# Patient Record
Sex: FEMALE | Race: WHITE | NOT HISPANIC OR LATINO | Employment: FULL TIME | URBAN - METROPOLITAN AREA
[De-identification: names, ages, dates, MRNs, and addresses within clinical notes are randomized per-mention and may not be internally consistent; named-entity substitution may affect disease eponyms.]

---

## 2017-03-29 ENCOUNTER — LAB VISIT (OUTPATIENT)
Dept: LAB | Facility: HOSPITAL | Age: 52
End: 2017-03-29
Attending: INTERNAL MEDICINE
Payer: COMMERCIAL

## 2017-03-29 DIAGNOSIS — I10 HTN (HYPERTENSION): ICD-10-CM

## 2017-03-29 DIAGNOSIS — I10 HTN (HYPERTENSION): Primary | ICD-10-CM

## 2017-03-29 DIAGNOSIS — E78.00 HYPERCHOLESTEREMIA: ICD-10-CM

## 2017-03-29 DIAGNOSIS — E66.01 MORBID OBESITY: ICD-10-CM

## 2017-03-29 LAB
ALBUMIN SERPL BCP-MCNC: 3.7 G/DL
ALP SERPL-CCNC: 96 U/L
ALT SERPL W/O P-5'-P-CCNC: 24 U/L
ANION GAP SERPL CALC-SCNC: 11 MMOL/L
AST SERPL-CCNC: 19 U/L
BASOPHILS # BLD AUTO: 0.03 K/UL
BASOPHILS NFR BLD: 0.5 %
BILIRUB SERPL-MCNC: 0.6 MG/DL
BUN SERPL-MCNC: 14 MG/DL
CALCIUM SERPL-MCNC: 9.3 MG/DL
CHLORIDE SERPL-SCNC: 103 MMOL/L
CHOLEST/HDLC SERPL: 2.4 {RATIO}
CO2 SERPL-SCNC: 28 MMOL/L
CREAT SERPL-MCNC: 0.7 MG/DL
DIFFERENTIAL METHOD: ABNORMAL
EOSINOPHIL # BLD AUTO: 0.1 K/UL
EOSINOPHIL NFR BLD: 2.1 %
ERYTHROCYTE [DISTWIDTH] IN BLOOD BY AUTOMATED COUNT: 12.6 %
EST. GFR  (AFRICAN AMERICAN): >60 ML/MIN/1.73 M^2
EST. GFR  (NON AFRICAN AMERICAN): >60 ML/MIN/1.73 M^2
GLUCOSE SERPL-MCNC: 106 MG/DL
HCT VFR BLD AUTO: 37 %
HDL/CHOLESTEROL RATIO: 42.4 %
HDLC SERPL-MCNC: 191 MG/DL
HDLC SERPL-MCNC: 81 MG/DL
HGB BLD-MCNC: 12.5 G/DL
LDLC SERPL CALC-MCNC: 102.8 MG/DL
LYMPHOCYTES # BLD AUTO: 1.7 K/UL
LYMPHOCYTES NFR BLD: 27.9 %
MCH RBC QN AUTO: 31.4 PG
MCHC RBC AUTO-ENTMCNC: 33.8 %
MCV RBC AUTO: 93 FL
MONOCYTES # BLD AUTO: 0.5 K/UL
MONOCYTES NFR BLD: 8.8 %
NEUTROPHILS # BLD AUTO: 3.7 K/UL
NEUTROPHILS NFR BLD: 60.5 %
NONHDLC SERPL-MCNC: 110 MG/DL
PLATELET # BLD AUTO: 265 K/UL
PMV BLD AUTO: 10.1 FL
POTASSIUM SERPL-SCNC: 3.8 MMOL/L
PROT SERPL-MCNC: 7.7 G/DL
RBC # BLD AUTO: 3.98 M/UL
SODIUM SERPL-SCNC: 142 MMOL/L
TRIGL SERPL-MCNC: 36 MG/DL
WBC # BLD AUTO: 6.05 K/UL

## 2017-03-29 PROCEDURE — 80061 LIPID PANEL: CPT

## 2017-03-29 PROCEDURE — 36415 COLL VENOUS BLD VENIPUNCTURE: CPT | Mod: PO

## 2017-03-29 PROCEDURE — 80053 COMPREHEN METABOLIC PANEL: CPT

## 2017-03-29 PROCEDURE — 83036 HEMOGLOBIN GLYCOSYLATED A1C: CPT

## 2017-03-29 PROCEDURE — 85025 COMPLETE CBC W/AUTO DIFF WBC: CPT

## 2017-03-30 LAB
ESTIMATED AVG GLUCOSE: 111 MG/DL
HBA1C MFR BLD HPLC: 5.5 %

## 2017-08-06 ENCOUNTER — HOSPITAL ENCOUNTER (EMERGENCY)
Facility: HOSPITAL | Age: 52
Discharge: HOME | End: 2017-08-06
Attending: EMERGENCY MEDICINE
Payer: COMMERCIAL

## 2017-08-06 VITALS
RESPIRATION RATE: 18 BRPM | OXYGEN SATURATION: 99 % | HEIGHT: 61 IN | TEMPERATURE: 98 F | BODY MASS INDEX: 38.33 KG/M2 | HEART RATE: 74 BPM | DIASTOLIC BLOOD PRESSURE: 77 MMHG | SYSTOLIC BLOOD PRESSURE: 155 MMHG | WEIGHT: 203 LBS

## 2017-08-06 DIAGNOSIS — R51.9 HEADACHE: ICD-10-CM

## 2017-08-06 DIAGNOSIS — R51.9 NONINTRACTABLE HEADACHE, UNSPECIFIED CHRONICITY PATTERN, UNSPECIFIED HEADACHE TYPE: ICD-10-CM

## 2017-08-06 DIAGNOSIS — I10 ESSENTIAL HYPERTENSION: Primary | ICD-10-CM

## 2017-08-06 LAB
ALBUMIN SERPL BCP-MCNC: 3.7 G/DL
ALP SERPL-CCNC: 108 U/L
ALT SERPL W/O P-5'-P-CCNC: 44 U/L
ANION GAP SERPL CALC-SCNC: 14 MMOL/L
AST SERPL-CCNC: 29 U/L
BASOPHILS # BLD AUTO: 0.04 K/UL
BASOPHILS NFR BLD: 0.5 %
BILIRUB SERPL-MCNC: 0.6 MG/DL
BUN SERPL-MCNC: 14 MG/DL
CALCIUM SERPL-MCNC: 9.2 MG/DL
CHLORIDE SERPL-SCNC: 100 MMOL/L
CO2 SERPL-SCNC: 27 MMOL/L
CREAT SERPL-MCNC: 0.8 MG/DL
DIFFERENTIAL METHOD: NORMAL
EOSINOPHIL # BLD AUTO: 0.1 K/UL
EOSINOPHIL NFR BLD: 1.6 %
ERYTHROCYTE [DISTWIDTH] IN BLOOD BY AUTOMATED COUNT: 12.9 %
EST. GFR  (AFRICAN AMERICAN): >60 ML/MIN/1.73 M^2
EST. GFR  (NON AFRICAN AMERICAN): >60 ML/MIN/1.73 M^2
GLUCOSE SERPL-MCNC: 106 MG/DL
HCT VFR BLD AUTO: 40.9 %
HGB BLD-MCNC: 13.4 G/DL
LYMPHOCYTES # BLD AUTO: 2.2 K/UL
LYMPHOCYTES NFR BLD: 27.4 %
MCH RBC QN AUTO: 30.5 PG
MCHC RBC AUTO-ENTMCNC: 32.8 G/DL
MCV RBC AUTO: 93 FL
MONOCYTES # BLD AUTO: 0.8 K/UL
MONOCYTES NFR BLD: 10.2 %
NEUTROPHILS # BLD AUTO: 4.8 K/UL
NEUTROPHILS NFR BLD: 60.2 %
PLATELET # BLD AUTO: 291 K/UL
PMV BLD AUTO: 9.6 FL
POTASSIUM SERPL-SCNC: 3.3 MMOL/L
PROT SERPL-MCNC: 8.1 G/DL
RBC # BLD AUTO: 4.39 M/UL
SODIUM SERPL-SCNC: 141 MMOL/L
TROPONIN I SERPL DL<=0.01 NG/ML-MCNC: 0.02 NG/ML
WBC # BLD AUTO: 8.04 K/UL

## 2017-08-06 PROCEDURE — 80053 COMPREHEN METABOLIC PANEL: CPT

## 2017-08-06 PROCEDURE — 96374 THER/PROPH/DIAG INJ IV PUSH: CPT

## 2017-08-06 PROCEDURE — 96361 HYDRATE IV INFUSION ADD-ON: CPT

## 2017-08-06 PROCEDURE — 96375 TX/PRO/DX INJ NEW DRUG ADDON: CPT

## 2017-08-06 PROCEDURE — 25000003 PHARM REV CODE 250: Performed by: EMERGENCY MEDICINE

## 2017-08-06 PROCEDURE — 93010 ELECTROCARDIOGRAM REPORT: CPT | Mod: ,,, | Performed by: NUCLEAR MEDICINE

## 2017-08-06 PROCEDURE — 85025 COMPLETE CBC W/AUTO DIFF WBC: CPT

## 2017-08-06 PROCEDURE — 99900035 HC TECH TIME PER 15 MIN (STAT)

## 2017-08-06 PROCEDURE — 63600175 PHARM REV CODE 636 W HCPCS: Performed by: EMERGENCY MEDICINE

## 2017-08-06 PROCEDURE — 99284 EMERGENCY DEPT VISIT MOD MDM: CPT | Mod: 25

## 2017-08-06 PROCEDURE — 93005 ELECTROCARDIOGRAM TRACING: CPT

## 2017-08-06 PROCEDURE — 84484 ASSAY OF TROPONIN QUANT: CPT

## 2017-08-06 RX ORDER — METOCLOPRAMIDE HYDROCHLORIDE 5 MG/ML
10 INJECTION INTRAMUSCULAR; INTRAVENOUS
Status: COMPLETED | OUTPATIENT
Start: 2017-08-06 | End: 2017-08-06

## 2017-08-06 RX ORDER — BUTALBITAL, ACETAMINOPHEN AND CAFFEINE 50; 325; 40 MG/1; MG/1; MG/1
1 TABLET ORAL EVERY 4 HOURS PRN
Qty: 20 TABLET | Refills: 0 | Status: SHIPPED | OUTPATIENT
Start: 2017-08-06 | End: 2017-09-05

## 2017-08-06 RX ORDER — VERAPAMIL HYDROCHLORIDE 240 MG/1
240 TABLET, FILM COATED, EXTENDED RELEASE ORAL NIGHTLY
COMMUNITY
End: 2019-05-06 | Stop reason: ALTCHOICE

## 2017-08-06 RX ORDER — KETOROLAC TROMETHAMINE 30 MG/ML
30 INJECTION, SOLUTION INTRAMUSCULAR; INTRAVENOUS
Status: COMPLETED | OUTPATIENT
Start: 2017-08-06 | End: 2017-08-06

## 2017-08-06 RX ORDER — DULOXETIN HYDROCHLORIDE 20 MG/1
20 CAPSULE, DELAYED RELEASE ORAL DAILY
COMMUNITY
End: 2019-05-21 | Stop reason: SDUPTHER

## 2017-08-06 RX ORDER — LOSARTAN POTASSIUM AND HYDROCHLOROTHIAZIDE 12.5; 1 MG/1; MG/1
1 TABLET ORAL DAILY
COMMUNITY
End: 2019-08-06 | Stop reason: SDUPTHER

## 2017-08-06 RX ORDER — DIPHENHYDRAMINE HYDROCHLORIDE 50 MG/ML
25 INJECTION INTRAMUSCULAR; INTRAVENOUS
Status: COMPLETED | OUTPATIENT
Start: 2017-08-06 | End: 2017-08-06

## 2017-08-06 RX ADMIN — SODIUM CHLORIDE 500 ML: 0.9 INJECTION, SOLUTION INTRAVENOUS at 06:08

## 2017-08-06 RX ADMIN — KETOROLAC TROMETHAMINE 30 MG: 30 INJECTION, SOLUTION INTRAMUSCULAR; INTRAVENOUS at 06:08

## 2017-08-06 RX ADMIN — METOCLOPRAMIDE 10 MG: 5 INJECTION, SOLUTION INTRAMUSCULAR; INTRAVENOUS at 06:08

## 2017-08-06 RX ADMIN — DIPHENHYDRAMINE HYDROCHLORIDE 25 MG: 50 INJECTION, SOLUTION INTRAMUSCULAR; INTRAVENOUS at 06:08

## 2017-08-06 NOTE — ED PROVIDER NOTES
Encounter Date: 2017       History     Chief Complaint   Patient presents with    Hypertension     with headache since Friday, vomiting yesterday that has resolved,      The history is provided by the patient.   Headache    This is a new problem. The current episode started yesterday. The problem occurs constantly. The problem has been unchanged. The pain is located in the left unilateral region. The pain does not radiate. The pain quality is similar to prior headaches. The quality of the pain is described as aching and dull. Pain scale: mild. Associated symptoms include nausea (today), photophobia and vomiting (one time yesterday). Pertinent negatives include no abdominal pain, abnormal behavior, anorexia, back pain, blurred vision, coughing, dizziness, drainage, ear pain, eye pain, eye redness, eye watering, facial sweating, fever, hearing loss, insomnia, loss of balance, muscle aches, neck pain, numbness, phonophobia, rhinorrhea, scalp tenderness, seizures, sinus pressure, sore throat, swollen glands, tingling, tinnitus, visual change, weakness or weight loss. Nothing aggravates the symptoms. She has tried acetaminophen for the symptoms. The treatment provided mild relief. Her past medical history is significant for hypertension and obesity. There is no history of cancer, cluster headaches, immunosuppression, migraine headaches, migraines in the family, pseudotumor cerebri, recent head traumas, sinus disease or TMJ.     Review of patient's allergies indicates:  No Known Allergies  Past Medical History:   Diagnosis Date    Depression     Hypertension      Past Surgical History:   Procedure Laterality Date     SECTION      EYE SURGERY  2017     History reviewed. No pertinent family history.  Social History   Substance Use Topics    Smoking status: Never Smoker    Smokeless tobacco: Never Used    Alcohol use Yes      Comment: rarely      Review of Systems   Constitutional: Negative for  fever and weight loss.   HENT: Negative for ear pain, hearing loss, rhinorrhea, sinus pressure, sore throat and tinnitus.    Eyes: Positive for photophobia. Negative for blurred vision, pain and redness.   Respiratory: Negative for cough and shortness of breath.    Cardiovascular: Negative for chest pain.   Gastrointestinal: Positive for nausea (today) and vomiting (one time yesterday). Negative for abdominal pain and anorexia.   Genitourinary: Negative for dysuria.   Musculoskeletal: Negative for back pain and neck pain.   Skin: Negative for rash.   Neurological: Positive for headaches. Negative for dizziness, tingling, seizures, weakness, numbness and loss of balance.   Hematological: Does not bruise/bleed easily.   Psychiatric/Behavioral: The patient does not have insomnia.    All other systems reviewed and are negative.      Physical Exam     Initial Vitals [08/06/17 1824]   BP Pulse Resp Temp SpO2   (!) 237/105 81 18 98.3 °F (36.8 °C) 98 %      MAP       149         Physical Exam    Nursing note and vitals reviewed.  Constitutional: She appears well-developed and well-nourished.   HENT:   Head: Normocephalic and atraumatic.       Right Ear: Hearing, tympanic membrane, external ear and ear canal normal.   Left Ear: Hearing, tympanic membrane, external ear and ear canal normal.   Nose: Nose normal.   Mouth/Throat: Uvula is midline, oropharynx is clear and moist and mucous membranes are normal. No oropharyngeal exudate.   Eyes: Conjunctivae and EOM are normal. Pupils are equal, round, and reactive to light.   Neck: Normal range of motion. Neck supple. No thyromegaly present.   Cardiovascular: Normal rate, regular rhythm, normal heart sounds and intact distal pulses. Exam reveals no gallop and no friction rub.    No murmur heard.  Pulmonary/Chest: Breath sounds normal. No respiratory distress. She has no wheezes. She has no rhonchi. She exhibits no tenderness.   Abdominal: Soft. Bowel sounds are normal. She  "exhibits no distension. There is no tenderness. There is no rebound and no guarding.   Musculoskeletal: Normal range of motion. She exhibits no edema or tenderness.   Lymphadenopathy:     She has no cervical adenopathy.   Neurological: She is alert and oriented to person, place, and time. She has normal strength. No cranial nerve deficit or sensory deficit.   Skin: Skin is warm and dry. No rash noted.   Psychiatric: She has a normal mood and affect. Her behavior is normal. Judgment and thought content normal.         ED Course   Procedures  Labs Reviewed   COMPREHENSIVE METABOLIC PANEL - Abnormal; Notable for the following:        Result Value    Potassium 3.3 (*)     All other components within normal limits   CBC W/ AUTO DIFFERENTIAL   TROPONIN I     EKG Readings: (Independently Interpreted)   Initial Reading: No STEMI. Rhythm: Normal Sinus Rhythm. Heart Rate: 75. Ectopy: No Ectopy. Conduction: Normal. ST Segments: Normal ST Segments. T Waves: Normal. Clinical Impression: Normal Sinus Rhythm             Vitals:    08/06/17 1824 08/06/17 1900 08/06/17 1936 08/06/17 2100   BP: (!) 237/105 (!) 192/96 (!) 182/84 (!) 155/77   Pulse: 81 71 64 74   Resp: 18  15 18   Temp: 98.3 °F (36.8 °C)      TempSrc: Oral      SpO2: 98% 100% 98% 99%   Weight: 92.1 kg (203 lb)      Height: 5' 1" (1.549 m)          Results for orders placed or performed during the hospital encounter of 08/06/17   CBC auto differential   Result Value Ref Range    WBC 8.04 3.90 - 12.70 K/uL    RBC 4.39 4.00 - 5.40 M/uL    Hemoglobin 13.4 12.0 - 16.0 g/dL    Hematocrit 40.9 37.0 - 48.5 %    MCV 93 82 - 98 fL    MCH 30.5 27.0 - 31.0 pg    MCHC 32.8 32.0 - 36.0 g/dL    RDW 12.9 11.5 - 14.5 %    Platelets 291 150 - 350 K/uL    MPV 9.6 9.2 - 12.9 fL    Gran # 4.8 1.8 - 7.7 K/uL    Lymph # 2.2 1.0 - 4.8 K/uL    Mono # 0.8 0.3 - 1.0 K/uL    Eos # 0.1 0.0 - 0.5 K/uL    Baso # 0.04 0.00 - 0.20 K/uL    Gran% 60.2 38.0 - 73.0 %    Lymph% 27.4 18.0 - 48.0 %    Mono% " 10.2 4.0 - 15.0 %    Eosinophil% 1.6 0.0 - 8.0 %    Basophil% 0.5 0.0 - 1.9 %    Differential Method Automated    Comprehensive metabolic panel   Result Value Ref Range    Sodium 141 136 - 145 mmol/L    Potassium 3.3 (L) 3.5 - 5.1 mmol/L    Chloride 100 95 - 110 mmol/L    CO2 27 23 - 29 mmol/L    Glucose 106 70 - 110 mg/dL    BUN, Bld 14 6 - 20 mg/dL    Creatinine 0.8 0.5 - 1.4 mg/dL    Calcium 9.2 8.7 - 10.5 mg/dL    Total Protein 8.1 6.0 - 8.4 g/dL    Albumin 3.7 3.5 - 5.2 g/dL    Total Bilirubin 0.6 0.1 - 1.0 mg/dL    Alkaline Phosphatase 108 55 - 135 U/L    AST 29 10 - 40 U/L    ALT 44 10 - 44 U/L    Anion Gap 14 8 - 16 mmol/L    eGFR if African American >60.0 >60 mL/min/1.73 m^2    eGFR if non African American >60.0 >60 mL/min/1.73 m^2   Troponin I #1   Result Value Ref Range    Troponin I 0.018 0.000 - 0.026 ng/mL         Imaging Results          CT Head Without Contrast (Final result)  Result time 08/06/17 19:18:13    Final result by BRITTA Espinoza Sr., MD (08/06/17 19:18:13)                 Impression:         Normal study.      All CT scans at this facility use dose modulation, iterative reconstruction, and/or weight base dosing when appropriate to reduce radiation dose when appropriate to reduce radiation dose to as low as reasonably achievable.      Electronically signed by: BRITTA ESPINOZA MD  Date:     08/06/17  Time:    19:18              Narrative:    CT of Head without IV contrast    History:     Headache    Technique: Standard brain CT protocol without IV contrast was performed.     Finding: The ventricles have a normal size, position, and appearance. There is no abnormal intracranial mass or intracranial hemorrhage. There is no skull fracture. The paranasal sinuses are normal in appearance.                              Medications   sodium chloride 0.9% bolus 500 mL (0 mLs Intravenous Stopped 8/6/17 2104)   ketorolac injection 30 mg (30 mg Intravenous Given 8/6/17 9518)   diphenhydrAMINE injection 25  mg (25 mg Intravenous Given 8/6/17 1849)   metoclopramide HCl injection 10 mg (10 mg Intravenous Given 8/6/17 1849)       8:27 PM - Re-evaluation: The patient is resting comfortably and is in no acute distress. She states that her symptoms have improved after treatment within ER. Discussed test results, shared treatment plan, specific conditions for return, and importance of follow up with patient and family.  She understands and agrees with the plan as discussed. Answered  her questions at this time. She has remained hemodynamically stable throughout the ED course and is appropriate for discharge home.     Regarding HYPERTENSION, I advised patient to: keep a record of blood pressure results; take your blood pressure medication exactly as directed without skipping doses; avoid medications that contain heart stimulants, including over-the-counter drugs such as decongestants; maintain a healthy weight; cut back on sodium intake (i.e., limit canned, dried, packaged, and fast foods and dont add salt to food); follow the DASH (Dietary Approaches to Stop Hypertension) eating plan which recommends vegetables, fruits, whole gains, and other heart healthy foods; begin an exercise program that includes  aerobic exercise 3 to 4 times a week for an average of 40 minutes at a time (with approval of cardiologist or primary care provider); limit drinks that contain alcohol and caffeine; control levels of emotional stress; and seek emergency care for any shortness of breath, chest pain, difficulty speaking, confusion, or visual changes.      Patient's headache is consistent with previous headaches and lacks features concerning for emergent or life threatening condition.  I do not suspect SAH, meningitis, increased IC pressure, infectious, toxic, vascular, CNS, or other EMC.  I have discussed this at length with patient.  Regarding treatment, advised patient to take nonsteroidal antiinflammatory medications, acetaminophen, or any  medications prescribed as instructed.  To prevent headaches, patient advised to avoid muscle tension (do not stay in one position for long periods of time), avoid eye strain (make sure there is adequate lighting for reading and routine tasks), eat healthy foods, exercise, and do not smoke or drink excessive alcohol.  Patient also advised to avoid overuse of over-the-counter or prescription medications. Patient was instructed to contact primary healthcare provider if: headaches continue to get worse; occur often enough that they affect daily work or normal activities; frequent medication use is needed to manage headaches; headaches that worsen and cause vomiting; or there are any questions or concerns about the condition or care. Advised patient to return to the emergency department or call 911 if they develop a sudden headache that presents suddenly and much worse than usual headaches; have difficulty seeing, speaking, or moving; become confused or have seizure activity; or develop a fever and stiff neck with the headache.     Pre-hypertension/Hypertension: The pt has been informed that they may have pre-hypertension or hypertension based on a blood pressure reading in the ED. I recommend that the pt call the PCP listed on their discharge instructions or a physician of their choice this week to arrange f/u for further evaluation of possible pre-hypertension or hypertension.     Janette Lee was given a handout which discussed their disease process, precautions, and instructions for follow-up and therapy.    Follow-up Information     Jordy Cenetno MD. Schedule an appointment as soon as possible for a visit in 1 week.    Specialty:  Internal Medicine  Contact information:  [address]  [facility] [address]  [phone]             [facility].    Specialty:  Emergency Medicine  Why:  As needed, If symptoms worsen  Contact information:  [address] [facility]  [address]  [place] [address]  [phone]                 Discharge Medication List as of 8/6/2017  8:00 PM      START taking these medications    Details   butalbital-acetaminophen-caffeine -40 mg (FIORICET, ESGIC) -40 mg per tablet Take 1 tablet by mouth every 4 (four) hours as needed for Pain., Starting Sun 8/6/2017, Until Tue 9/5/2017, Print                ED Diagnosis  1. Essential hypertension    2. Headache    3. Nonintractable headache, unspecified chronicity pattern, unspecified headache type                          ED Course     Clinical Impression:   The primary encounter diagnosis was Essential hypertension. Diagnoses of Headache and Nonintractable headache, unspecified chronicity pattern, unspecified headache type were also pertinent to this visit.    Disposition:   Disposition: Discharged  Condition: Stable                        Ralf Alvarenga Jr., MD  08/07/17 0424

## 2017-08-06 NOTE — ED NOTES
Pt resting in bed.  C/o htn & headache since Friday, vomiting yesterday, none today.   Tylenol taken for  Headache at 1400 without relief,   Denies tingling , numbness, changes in vision.  Had a weak spell this afternoon & had to stop & sit for a while.. RR equal and unlabored.  Is compliant with BP meds. Will continue to monitorPatient verbally verified and Spelled Full Name and Date of Birth. LOC: The patient is awake, alert and aware of environment with an appropriate affect, the patient is oriented x 3 and speaking appropriately.  APPEARANCE: Patient is clean and well groomed, patient's clothing is properly fastened.  HEENT: Brief WNL  SKIN: warm & dry   MUSCULOSKELETAL: Brief WNL, MAEW, denies pain  RESPIRATORY: chest clear dana, denies cough or cold or SOB  CARDIAC:Sinus rhythm at 82/min, denies chest pain  GASTRO: denies nausea or abdominal pain, abdomen soft  : Brief WNL  Peripheral Vasc: no edema  NEURO: awake & alert, speech clear  PSYCH: Brief WNL

## 2017-08-07 NOTE — DISCHARGE INSTRUCTIONS
Patient's headache is consistent with previous headaches and lacks features concerning for emergent or life threatening condition.  I do not suspect SAH, meningitis, increased IC pressure, infectious, toxic, vascular, CNS, or other EMC.  I have discussed this at length with patient.  Regarding treatment, advised patient to take nonsteroidal antiinflammatory medications, acetaminophen, or any medications prescribed as instructed.  To prevent headaches, patient advised to avoid muscle tension (do not stay in one position for long periods of time), avoid eye strain (make sure there is adequate lighting for reading and routine tasks), eat healthy foods, exercise, and do not smoke or drink excessive alcohol.  Patient also advised to avoid overuse of over-the-counter or prescription medications. Patient was instructed to contact primary healthcare provider if: headaches continue to get worse; occur often enough that they affect daily work or normal activities; frequent medication use is needed to manage headaches; headaches that worsen and cause vomiting; or there are any questions or concerns about the condition or care. Advised patient to return to the emergency department or call 911 if they develop a sudden headache that presents suddenly and much worse than usual headaches; have difficulty seeing, speaking, or moving; become confused or have seizure activity; or develop a fever and stiff neck with the headache.     Regarding HYPERTENSION, I advised patient to: keep a record of blood pressure results; take your blood pressure medication exactly as directed without skipping doses; avoid medications that contain heart stimulants, including over-the-counter drugs such as decongestants; maintain a healthy weight; cut back on sodium intake (i.e., limit canned, dried, packaged, and fast foods and don?t add salt to food); follow the DASH (Dietary Approaches to Stop Hypertension) eating plan which recommends vegetables,  fruits, whole gains, and other heart healthy foods; begin an exercise program that includes  aerobic exercise 3 to 4 times a week for an average of 40 minutes at a time (with approval of cardiologist or primary care provider); limit drinks that contain alcohol and caffeine; control levels of emotional stress; and seek emergency care for any shortness of breath, chest pain, difficulty speaking, confusion, or visual changes.

## 2017-08-07 NOTE — ED NOTES
Pt states no further needs/concerns. resp even, unlabored. No distress noted. Pt AAOx3. Will d/c per MD order. MD aware of pt's vital signs and states ok for d/c.

## 2018-08-27 ENCOUNTER — HOSPITAL ENCOUNTER (EMERGENCY)
Facility: HOSPITAL | Age: 53
Discharge: HOME | End: 2018-08-27
Attending: EMERGENCY MEDICINE
Payer: COMMERCIAL

## 2018-08-27 VITALS
WEIGHT: 198.06 LBS | TEMPERATURE: 98 F | SYSTOLIC BLOOD PRESSURE: 165 MMHG | RESPIRATION RATE: 20 BRPM | BODY MASS INDEX: 37.39 KG/M2 | DIASTOLIC BLOOD PRESSURE: 85 MMHG | OXYGEN SATURATION: 98 % | HEIGHT: 61 IN | HEART RATE: 65 BPM

## 2018-08-27 DIAGNOSIS — I10 ESSENTIAL HYPERTENSION: ICD-10-CM

## 2018-08-27 DIAGNOSIS — R42 VERTIGO: Primary | ICD-10-CM

## 2018-08-27 LAB
ALBUMIN SERPL BCP-MCNC: 3.9 G/DL
ALP SERPL-CCNC: 94 U/L
ALT SERPL W/O P-5'-P-CCNC: 24 U/L
ANION GAP SERPL CALC-SCNC: 12 MMOL/L
AST SERPL-CCNC: 19 U/L
BASOPHILS # BLD AUTO: 0.03 K/UL
BASOPHILS NFR BLD: 0.4 %
BILIRUB SERPL-MCNC: 0.7 MG/DL
BUN SERPL-MCNC: 12 MG/DL
CALCIUM SERPL-MCNC: 9.8 MG/DL
CHLORIDE SERPL-SCNC: 103 MMOL/L
CO2 SERPL-SCNC: 29 MMOL/L
CREAT SERPL-MCNC: 0.8 MG/DL
DIFFERENTIAL METHOD: NORMAL
EOSINOPHIL # BLD AUTO: 0.1 K/UL
EOSINOPHIL NFR BLD: 1.3 %
ERYTHROCYTE [DISTWIDTH] IN BLOOD BY AUTOMATED COUNT: 12.6 %
EST. GFR  (AFRICAN AMERICAN): >60 ML/MIN/1.73 M^2
EST. GFR  (NON AFRICAN AMERICAN): >60 ML/MIN/1.73 M^2
GLUCOSE SERPL-MCNC: 110 MG/DL
HCT VFR BLD AUTO: 41.7 %
HGB BLD-MCNC: 14 G/DL
LYMPHOCYTES # BLD AUTO: 2 K/UL
LYMPHOCYTES NFR BLD: 27.5 %
MCH RBC QN AUTO: 30.9 PG
MCHC RBC AUTO-ENTMCNC: 33.6 G/DL
MCV RBC AUTO: 92 FL
MONOCYTES # BLD AUTO: 0.6 K/UL
MONOCYTES NFR BLD: 8.1 %
NEUTROPHILS # BLD AUTO: 4.5 K/UL
NEUTROPHILS NFR BLD: 62.6 %
PLATELET # BLD AUTO: 282 K/UL
PMV BLD AUTO: 10.3 FL
POTASSIUM SERPL-SCNC: 3.6 MMOL/L
PROT SERPL-MCNC: 8.3 G/DL
RBC # BLD AUTO: 4.53 M/UL
SODIUM SERPL-SCNC: 144 MMOL/L
WBC # BLD AUTO: 7.14 K/UL

## 2018-08-27 PROCEDURE — 85025 COMPLETE CBC W/AUTO DIFF WBC: CPT

## 2018-08-27 PROCEDURE — 96375 TX/PRO/DX INJ NEW DRUG ADDON: CPT | Mod: 59

## 2018-08-27 PROCEDURE — 96361 HYDRATE IV INFUSION ADD-ON: CPT

## 2018-08-27 PROCEDURE — 96374 THER/PROPH/DIAG INJ IV PUSH: CPT

## 2018-08-27 PROCEDURE — 25000003 PHARM REV CODE 250: Performed by: EMERGENCY MEDICINE

## 2018-08-27 PROCEDURE — 63600175 PHARM REV CODE 636 W HCPCS: Performed by: EMERGENCY MEDICINE

## 2018-08-27 PROCEDURE — 99284 EMERGENCY DEPT VISIT MOD MDM: CPT | Mod: 25

## 2018-08-27 PROCEDURE — 80053 COMPREHEN METABOLIC PANEL: CPT

## 2018-08-27 RX ORDER — ONDANSETRON 4 MG/1
4 TABLET, ORALLY DISINTEGRATING ORAL EVERY 8 HOURS PRN
Qty: 12 TABLET | Refills: 0 | Status: SHIPPED | OUTPATIENT
Start: 2018-08-27 | End: 2019-05-06

## 2018-08-27 RX ORDER — MECLIZINE HYDROCHLORIDE 25 MG/1
25 TABLET ORAL 3 TIMES DAILY PRN
Qty: 20 TABLET | Refills: 0 | Status: SHIPPED | OUTPATIENT
Start: 2018-08-27 | End: 2019-05-06

## 2018-08-27 RX ORDER — ONDANSETRON 2 MG/ML
4 INJECTION INTRAMUSCULAR; INTRAVENOUS ONCE
Status: COMPLETED | OUTPATIENT
Start: 2018-08-27 | End: 2018-08-27

## 2018-08-27 RX ORDER — MECLIZINE HYDROCHLORIDE 25 MG/1
25 TABLET ORAL
Status: COMPLETED | OUTPATIENT
Start: 2018-08-27 | End: 2018-08-27

## 2018-08-27 RX ADMIN — SODIUM CHLORIDE 1000 ML: 0.9 INJECTION, SOLUTION INTRAVENOUS at 09:08

## 2018-08-27 RX ADMIN — ONDANSETRON 4 MG: 2 INJECTION, SOLUTION INTRAMUSCULAR; INTRAVENOUS at 09:08

## 2018-08-27 RX ADMIN — LORAZEPAM 1 MG: 2 INJECTION INTRAMUSCULAR; INTRAVENOUS at 09:08

## 2018-08-27 RX ADMIN — MECLIZINE HYDROCHLORIDE 25 MG: 25 TABLET ORAL at 09:08

## 2018-08-27 NOTE — ED PROVIDER NOTES
"Encounter Date: 2018       History     Chief Complaint   Patient presents with    Dizziness     dizziness constant since yest. but an episode sat and resolved. also vomiting with dizziness.     The history is provided by the patient.   Neurologic Problem   The primary symptoms include dizziness, nausea and vomiting. Primary symptoms do not include headaches, syncope, loss of consciousness, altered mental status, seizures, visual change, paresthesias, focal weakness, loss of sensation, speech change, memory loss or fever. The symptoms began two days ago. The episode lasted 2 days. The symptoms are waxing and waning. The neurological symptoms are diffuse.   She describes the dizziness as sensation of spinning. The dizziness began 2 to 5 days ago. The dizziness has been gradually worsening since its onset. Dizziness also occurs with nausea and vomiting. Dizziness does not occur with blurred vision, tinnitus, hearing loss, weakness or diaphoresis.   The vomiting began 2 days ago.   Additional symptoms include vertigo. Additional symptoms do not include neck stiffness, weakness, pain, leg pain, photophobia, hallucinations, nystagmus, taste disturbance, hyperacusis, hearing loss, tinnitus or anxiety. Medical issues do not include seizures, cerebral vascular accident or cancer.     Hx of "vertigo" but pt states she has never gone to  To get it checked out.  Sim episodes @ 1x/year.       Review of patient's allergies indicates:  No Known Allergies  Past Medical History:   Diagnosis Date    Depression     Hypertension      Past Surgical History:   Procedure Laterality Date     SECTION      EYE SURGERY  2017     Family History   Problem Relation Age of Onset    Hypertension Mother     Diabetes Father     Hypertension Father     Heart disease Father      Social History     Tobacco Use    Smoking status: Never Smoker    Smokeless tobacco: Never Used   Substance Use Topics    Alcohol use: Yes    " " Comment: rarely     Drug use: No     Review of Systems   Constitutional: Negative for diaphoresis and fever.   HENT: Negative for hearing loss, sore throat and tinnitus.    Eyes: Negative for blurred vision and photophobia.   Respiratory: Negative for shortness of breath.    Cardiovascular: Negative for chest pain and syncope.   Gastrointestinal: Positive for nausea and vomiting.   Genitourinary: Negative for dysuria.   Musculoskeletal: Negative for back pain and neck stiffness.   Skin: Negative for rash.   Neurological: Positive for dizziness and vertigo. Negative for speech change, focal weakness, seizures, loss of consciousness, weakness, headaches and paresthesias.   Hematological: Does not bruise/bleed easily.   Psychiatric/Behavioral: Negative for hallucinations and memory loss.   All other systems reviewed and are negative.      Physical Exam     Initial Vitals [08/27/18 0817]   BP Pulse Resp Temp SpO2   (!) 192/83 74 20 97.5 °F (36.4 °C) 99 %      MAP       --         Vitals:    08/27/18 0817 08/27/18 0954 08/27/18 1033   BP: (!) 192/83 (!) 176/79 (!) 165/85   Pulse: 74 62 65   Resp: 20 18 20   Temp: 97.5 °F (36.4 °C)     TempSrc: Oral     SpO2: 99% 100% 98%   Weight: 89.8 kg (198 lb 1.3 oz)     Height: 5' 1" (1.549 m)       Physical Exam    Nursing note and vitals reviewed.  Constitutional: She appears well-developed and well-nourished. No distress.   HENT:   Head: Normocephalic and atraumatic.   Right Ear: External ear normal.   Left Ear: External ear normal.   Eyes: EOM are normal. Pupils are equal, round, and reactive to light.   Neck: Normal range of motion. Neck supple.   Cardiovascular: Normal rate, regular rhythm, normal heart sounds and intact distal pulses.   Pulmonary/Chest: Breath sounds normal. No stridor. She has no wheezes. She has no rhonchi.   Abdominal: Soft. Bowel sounds are normal. She exhibits no mass. There is no rebound and no guarding.   Musculoskeletal: Normal range of motion. She " exhibits no edema.   Neurological: She is alert and oriented to person, place, and time. She has normal strength. No sensory deficit.   Skin: Skin is warm and dry. Capillary refill takes less than 2 seconds. No rash noted.   Psychiatric: She has a normal mood and affect. Her behavior is normal. Judgment and thought content normal.         ED Course   Procedures  Labs Reviewed   CBC W/ AUTO DIFFERENTIAL   COMPREHENSIVE METABOLIC PANEL         Results for orders placed or performed during the hospital encounter of 08/27/18   CBC auto differential   Result Value Ref Range    WBC 7.14 3.90 - 12.70 K/uL    RBC 4.53 4.00 - 5.40 M/uL    Hemoglobin 14.0 12.0 - 16.0 g/dL    Hematocrit 41.7 37.0 - 48.5 %    MCV 92 82 - 98 fL    MCH 30.9 27.0 - 31.0 pg    MCHC 33.6 32.0 - 36.0 g/dL    RDW 12.6 11.5 - 14.5 %    Platelets 282 150 - 350 K/uL    MPV 10.3 9.2 - 12.9 fL    Gran # (ANC) 4.5 1.8 - 7.7 K/uL    Lymph # 2.0 1.0 - 4.8 K/uL    Mono # 0.6 0.3 - 1.0 K/uL    Eos # 0.1 0.0 - 0.5 K/uL    Baso # 0.03 0.00 - 0.20 K/uL    Gran% 62.6 38.0 - 73.0 %    Lymph% 27.5 18.0 - 48.0 %    Mono% 8.1 4.0 - 15.0 %    Eosinophil% 1.3 0.0 - 8.0 %    Basophil% 0.4 0.0 - 1.9 %    Differential Method Automated    Comprehensive metabolic panel   Result Value Ref Range    Sodium 144 136 - 145 mmol/L    Potassium 3.6 3.5 - 5.1 mmol/L    Chloride 103 95 - 110 mmol/L    CO2 29 23 - 29 mmol/L    Glucose 110 70 - 110 mg/dL    BUN, Bld 12 6 - 20 mg/dL    Creatinine 0.8 0.5 - 1.4 mg/dL    Calcium 9.8 8.7 - 10.5 mg/dL    Total Protein 8.3 6.0 - 8.4 g/dL    Albumin 3.9 3.5 - 5.2 g/dL    Total Bilirubin 0.7 0.1 - 1.0 mg/dL    Alkaline Phosphatase 94 55 - 135 U/L    AST 19 10 - 40 U/L    ALT 24 10 - 44 U/L    Anion Gap 12 8 - 16 mmol/L    eGFR if African American >60.0 >60 mL/min/1.73 m^2    eGFR if non African American >60.0 >60 mL/min/1.73 m^2        Imaging Results          CT Head Without Contrast (Final result)  Result time 08/27/18 09:17:35    Final result  by Asif Rizvi Jr., MD (08/27/18 09:17:35)                 Impression:      No acute intracranial findings evident.    All CT scans at this facility are performed  using dose modulation techniques as appropriate to performed exam including the following:  automated exposure control; adjustment of mA and/or kV according to the patients size (this includes techniques or standardized protocols for targeted exams where dose is matched to indication/reason for exam: i.e. extremities or head);  iterative reconstruction technique.      Electronically signed by: Asif Rizvi MD  Date:    08/27/2018  Time:    09:17             Narrative:    EXAMINATION:  CT HEAD WITHOUT CONTRAST    CLINICAL HISTORY:  Dizziness;    TECHNIQUE:  Noncontrast axial images of the head were obtained.    COMPARISON:  08/06/2017    FINDINGS:  No significant changes.    Ventricular system is normal.  No hydrocephalus.  No midline shift.  No abnormal density to indicate acute major vascular distribution ischemic infarction or hemorrhage.  No mass effect.  No extra-axial fluid collections.    Visualized paranasal sinuses and mastoid air cells appear clear.    No calvarial fracture.    Vascular calcifications.                                     Imaging Results          CT Head Without Contrast (Final result)  Result time 08/27/18 09:17:35    Final result by Asif Rizvi Jr., MD (08/27/18 09:17:35)                 Impression:      No acute intracranial findings evident.    All CT scans at this facility are performed  using dose modulation techniques as appropriate to performed exam including the following:  automated exposure control; adjustment of mA and/or kV according to the patients size (this includes techniques or standardized protocols for targeted exams where dose is matched to indication/reason for exam: i.e. extremities or head);  iterative reconstruction technique.      Electronically signed by: Asif Rizvi  MD  Date:    08/27/2018  Time:    09:17             Narrative:    EXAMINATION:  CT HEAD WITHOUT CONTRAST    CLINICAL HISTORY:  Dizziness;    TECHNIQUE:  Noncontrast axial images of the head were obtained.    COMPARISON:  08/06/2017    FINDINGS:  No significant changes.    Ventricular system is normal.  No hydrocephalus.  No midline shift.  No abnormal density to indicate acute major vascular distribution ischemic infarction or hemorrhage.  No mass effect.  No extra-axial fluid collections.    Visualized paranasal sinuses and mastoid air cells appear clear.    No calvarial fracture.    Vascular calcifications.                                      patient improved after treatment here in emerged department.  She still has some complaint of dizziness but is much better and ambulates without difficulty.  We discussed the differential diagnosis including the peripheral versus central vertigo.  Due the fact that she has had multiple episodes of this in the past and it was much worse on Saturday got better again yesterday and then started again today, I believe is much more likely a peripheral vertigo.  She will follow up with her primary care doctor and an ear nose and throat doctor for further evaluation treatment she will return emergency department for any worsening signs or symptoms.               Clinical Impression:   The primary encounter diagnosis was Vertigo. A diagnosis of Essential hypertension was also pertinent to this visit.      Disposition:   Disposition: Discharged  Condition: Stable                        Logan Durbin MD  08/27/18 5821

## 2018-08-28 ENCOUNTER — OFFICE VISIT (OUTPATIENT)
Dept: OTOLARYNGOLOGY | Facility: CLINIC | Age: 53
End: 2018-08-28
Payer: COMMERCIAL

## 2018-08-28 ENCOUNTER — CLINICAL SUPPORT (OUTPATIENT)
Dept: AUDIOLOGY | Facility: CLINIC | Age: 53
End: 2018-08-28
Payer: COMMERCIAL

## 2018-08-28 VITALS
TEMPERATURE: 98 F | WEIGHT: 201.94 LBS | SYSTOLIC BLOOD PRESSURE: 161 MMHG | DIASTOLIC BLOOD PRESSURE: 82 MMHG | HEART RATE: 70 BPM | BODY MASS INDEX: 38.16 KG/M2

## 2018-08-28 DIAGNOSIS — H81.11 BPPV (BENIGN PAROXYSMAL POSITIONAL VERTIGO), RIGHT: Primary | ICD-10-CM

## 2018-08-28 DIAGNOSIS — H81.11 BENIGN PAROXYSMAL POSITIONAL VERTIGO OF RIGHT EAR: Primary | ICD-10-CM

## 2018-08-28 PROCEDURE — 99204 OFFICE O/P NEW MOD 45 MIN: CPT | Mod: 25,S$GLB,, | Performed by: ORTHOPAEDIC SURGERY

## 2018-08-28 PROCEDURE — 3077F SYST BP >= 140 MM HG: CPT | Mod: CPTII,S$GLB,, | Performed by: ORTHOPAEDIC SURGERY

## 2018-08-28 PROCEDURE — 99999 PR PBB SHADOW E&M-EST. PATIENT-LVL III: CPT | Mod: PBBFAC,,, | Performed by: ORTHOPAEDIC SURGERY

## 2018-08-28 PROCEDURE — 3008F BODY MASS INDEX DOCD: CPT | Mod: CPTII,S$GLB,, | Performed by: ORTHOPAEDIC SURGERY

## 2018-08-28 PROCEDURE — 3079F DIAST BP 80-89 MM HG: CPT | Mod: CPTII,S$GLB,, | Performed by: ORTHOPAEDIC SURGERY

## 2018-08-28 PROCEDURE — 95992 CANALITH REPOSITIONING PROC: CPT | Mod: RT,S$GLB,, | Performed by: ORTHOPAEDIC SURGERY

## 2018-08-28 NOTE — PROGRESS NOTES
Referring provider:     Janette Lee was seen 08/28/2018 for Epley maneuver for BPPV in the right ear.      A 5-position Epley maneuver was performed for the right ear under supervision.  Patient tolerated the maneuver well and was asymptomatic upon discharge.  Post-Epley home instructions were reviewed and given to the patient.  Understanding was voiced.    Recommendations: Return in 1 week for recheck.    Diagnosis: BPPV - RE

## 2018-08-28 NOTE — PROGRESS NOTES
Subjective:       Patient ID: Janette Lee is a 52 y.o. female.    Chief Complaint: Other (review audio and has been having dizziness)    Patient is a very pleasant 52 y.o. female here to see me today for the first time for evaluation of dizziness.   She reports that the symptoms have been present for the last 1 day.  She says that her symptoms started yesterday, and were triggered by turning over in bed or sitting up in bed.  It triggered her to have severe nausea and vomiting.  She was given a prescription for meclizine and zofran.  She describes the dizziness as whirling and a sensation of movement of surroundings and says that it lasts minutes.  She has noted that rolling over acts as a trigger.  She denies aural pressure, otalgia, otorrhea, tinnitus and hearing loss.  She has not started any new medications, and has not had any recent dietary changes.  She has not had any recent head trauma.        Review of Systems   Constitutional: Negative.  Negative for chills, fatigue, fever and unexpected weight change.   HENT: Negative for congestion, dental problem, ear discharge, ear pain, facial swelling, hearing loss, nosebleeds, postnasal drip, rhinorrhea, sinus pressure, sneezing, sore throat, tinnitus, trouble swallowing and voice change.    Eyes: Negative.  Negative for redness, itching and visual disturbance.   Respiratory: Negative.  Negative for cough, choking, shortness of breath and wheezing.    Cardiovascular: Negative.  Negative for chest pain and palpitations.   Gastrointestinal: Negative.  Negative for abdominal pain.        No reflux.   Musculoskeletal: Negative.  Negative for gait problem.   Skin: Negative for rash.   Neurological: Positive for dizziness. Negative for light-headedness and headaches.       Objective:      Physical Exam   Constitutional: She is oriented to person, place, and time. She appears well-developed and well-nourished. No distress.   HENT:   Head: Normocephalic and  atraumatic.   Right Ear: Tympanic membrane, external ear and ear canal normal.   Left Ear: Tympanic membrane, external ear and ear canal normal.   Nose: Nose normal. No mucosal edema, rhinorrhea, nasal deformity or septal deviation. No epistaxis. Right sinus exhibits no maxillary sinus tenderness and no frontal sinus tenderness. Left sinus exhibits no maxillary sinus tenderness and no frontal sinus tenderness.   Mouth/Throat: Uvula is midline, oropharynx is clear and moist and mucous membranes are normal. Mucous membranes are not pale and not dry. No dental caries. No oropharyngeal exudate or posterior oropharyngeal erythema.   Eyes: Conjunctivae, EOM and lids are normal. Pupils are equal, round, and reactive to light. Right eye exhibits no chemosis. Left eye exhibits no chemosis. Right conjunctiva is not injected. Left conjunctiva is not injected. No scleral icterus. Right eye exhibits normal extraocular motion and no nystagmus. Left eye exhibits normal extraocular motion and no nystagmus.   Neck: Trachea normal and phonation normal. No tracheal tenderness present. No tracheal deviation present. No thyroid mass and no thyromegaly present.   Cardiovascular: Intact distal pulses.   Pulmonary/Chest: Effort normal. No stridor. No respiratory distress.   Abdominal: She exhibits no distension.   Lymphadenopathy:        Head (right side): No submental, no submandibular, no preauricular, no posterior auricular and no occipital adenopathy present.        Head (left side): No submental, no submandibular, no preauricular, no posterior auricular and no occipital adenopathy present.     She has no cervical adenopathy.   Neurological: She is alert and oriented to person, place, and time. No cranial nerve deficit.   Tiline-Hallpike positive on the right   Skin: Skin is warm and dry. No rash noted. No erythema.   Psychiatric: She has a normal mood and affect. Her behavior is normal.         Janette Lee was seen 08/28/2018 for  Epley maneuver for BPPV in the right ear.      A 5-position Epley maneuver was performed for the right.  Patient tolerated the maneuver well and was asymptomatic upon discharge.  No nystagmus seen on post-procedure examination.  Post-Epley home instructions were reviewed and given to the patient.  Understanding was voiced.          Assessment:       1. BPPV (benign paroxysmal positional vertigo), right        Plan:       1.  Right BPPV:  We had a long discussion regarding the relevant anatomy and pathology relevant to BPPV.  We discussed that in the ear there are three semicircular canals that detect rotational movement.  BPPV occurs as a result of otoconia, tiny crystals of calcium carbonate that are a normal part of the inner ears anatomy, detaching from their normal anatomic position and collecting in one of the semicircular canals.  When the head moves, the otoconia shift. This stimulates the cupula to send false signals to the brain, producing vertigo and triggering nystagmus (involuntary eye movements).  She has had CRM of the right ear, RTC one week for recheck. S he has cerumen in her ears, but just had repositioning done.  Will clean at her return visit.

## 2018-09-04 ENCOUNTER — OFFICE VISIT (OUTPATIENT)
Dept: OTOLARYNGOLOGY | Facility: CLINIC | Age: 53
End: 2018-09-04
Payer: COMMERCIAL

## 2018-09-04 VITALS
DIASTOLIC BLOOD PRESSURE: 83 MMHG | WEIGHT: 201.94 LBS | HEART RATE: 78 BPM | TEMPERATURE: 98 F | SYSTOLIC BLOOD PRESSURE: 156 MMHG | BODY MASS INDEX: 38.16 KG/M2

## 2018-09-04 DIAGNOSIS — H61.23 BILATERAL IMPACTED CERUMEN: Primary | ICD-10-CM

## 2018-09-04 PROCEDURE — 3079F DIAST BP 80-89 MM HG: CPT | Mod: CPTII,S$GLB,, | Performed by: PHYSICIAN ASSISTANT

## 2018-09-04 PROCEDURE — 99999 PR PBB SHADOW E&M-EST. PATIENT-LVL III: CPT | Mod: PBBFAC,,, | Performed by: PHYSICIAN ASSISTANT

## 2018-09-04 PROCEDURE — 3077F SYST BP >= 140 MM HG: CPT | Mod: CPTII,S$GLB,, | Performed by: PHYSICIAN ASSISTANT

## 2018-09-04 PROCEDURE — 99213 OFFICE O/P EST LOW 20 MIN: CPT | Mod: 25,S$GLB,, | Performed by: PHYSICIAN ASSISTANT

## 2018-09-04 PROCEDURE — 69210 REMOVE IMPACTED EAR WAX UNI: CPT | Mod: S$GLB,,, | Performed by: PHYSICIAN ASSISTANT

## 2018-09-04 PROCEDURE — 3008F BODY MASS INDEX DOCD: CPT | Mod: CPTII,S$GLB,, | Performed by: PHYSICIAN ASSISTANT

## 2018-09-04 NOTE — PROGRESS NOTES
Subjective:       Patient ID: Janette Lee is a 52 y.o. female.    Chief Complaint: Follow-up (1 week rtc per Dr. Louis )    Patient is a very pleasant 52 y.o. female here to see me today for follow up of dizziness.   She was initially seen by Dr. Louis last week  And was treated for BPPV. She states symptoms have resolved. She return today for ear wax removal.        Review of Systems   Constitutional: Negative for chills, fatigue, fever and unexpected weight change.   HENT: Negative for congestion, dental problem, ear discharge, ear pain, facial swelling, hearing loss, nosebleeds, postnasal drip, rhinorrhea, sinus pressure, sneezing, sore throat, tinnitus, trouble swallowing and voice change.    Eyes: Negative for redness, itching and visual disturbance.   Respiratory: Negative for cough, choking, shortness of breath and wheezing.    Cardiovascular: Negative for chest pain and palpitations.   Gastrointestinal: Negative for abdominal pain.        No reflux.   Musculoskeletal: Negative for gait problem.   Skin: Negative for rash.   Neurological: Negative for dizziness, light-headedness and headaches.       Objective:      Physical Exam   Constitutional: She is oriented to person, place, and time. She appears well-developed and well-nourished. No distress.   HENT:   Head: Normocephalic and atraumatic.   Right Ear: Tympanic membrane, external ear and ear canal normal.   Left Ear: Tympanic membrane, external ear and ear canal normal.   Nose: Nose normal. No mucosal edema, rhinorrhea, nasal deformity or septal deviation. No epistaxis. Right sinus exhibits no maxillary sinus tenderness and no frontal sinus tenderness. Left sinus exhibits no maxillary sinus tenderness and no frontal sinus tenderness.   Mouth/Throat: Uvula is midline, oropharynx is clear and moist and mucous membranes are normal. Mucous membranes are not pale and not dry. No dental caries. No oropharyngeal exudate or posterior oropharyngeal  erythema.   Bilateral cerumen impaction bilaterally   Eyes: Conjunctivae, EOM and lids are normal. Pupils are equal, round, and reactive to light. Right eye exhibits no chemosis. Left eye exhibits no chemosis. Right conjunctiva is not injected. Left conjunctiva is not injected. No scleral icterus. Right eye exhibits normal extraocular motion and no nystagmus. Left eye exhibits normal extraocular motion and no nystagmus.   Neck: Trachea normal and phonation normal. No tracheal tenderness present. No tracheal deviation present. No thyroid mass and no thyromegaly present.   Cardiovascular: Intact distal pulses.   Pulmonary/Chest: Effort normal. No stridor. No respiratory distress.   Abdominal: She exhibits no distension.   Lymphadenopathy:        Head (right side): No submental, no submandibular, no preauricular, no posterior auricular and no occipital adenopathy present.        Head (left side): No submental, no submandibular, no preauricular, no posterior auricular and no occipital adenopathy present.     She has no cervical adenopathy.   Neurological: She is alert and oriented to person, place, and time. No cranial nerve deficit.   Skin: Skin is warm and dry. No rash noted. No erythema.   Psychiatric: She has a normal mood and affect. Her behavior is normal.       Procedure Note    CHIEF COMPLAINT:  Cerumen Impaction    Description:  The patient was seated in an exam chair.  An ear speculum was placed in the right EAC and was examined under the microscope.  Suction and/or loop curettes were used to remove a large cerumen impaction.  The tympanic membrane was visualized and was normal in appearance.  The procedure was repeated on the left side in a similar fashion but unable to fully remove wax.     Assessment:       1. Bilateral impacted cerumen        Plan:      Cerumen impaction:  Removed  From right ear today without difficulty.  Unable to fully remove from left.  I would recommend the use of a wax softening  drop, either over the counter Debrox or mineral oil, on a daily  basis.  I also instructed the patient to avoid Qtips. RTC next week.

## 2018-09-11 ENCOUNTER — OFFICE VISIT (OUTPATIENT)
Dept: OTOLARYNGOLOGY | Facility: CLINIC | Age: 53
End: 2018-09-11
Payer: COMMERCIAL

## 2018-09-11 VITALS
TEMPERATURE: 97 F | DIASTOLIC BLOOD PRESSURE: 78 MMHG | SYSTOLIC BLOOD PRESSURE: 134 MMHG | WEIGHT: 201.94 LBS | HEART RATE: 87 BPM | BODY MASS INDEX: 38.16 KG/M2

## 2018-09-11 DIAGNOSIS — H61.22 IMPACTED CERUMEN OF LEFT EAR: Primary | ICD-10-CM

## 2018-09-11 PROCEDURE — 3075F SYST BP GE 130 - 139MM HG: CPT | Mod: CPTII,S$GLB,, | Performed by: PHYSICIAN ASSISTANT

## 2018-09-11 PROCEDURE — 69210 REMOVE IMPACTED EAR WAX UNI: CPT | Mod: S$GLB,,, | Performed by: PHYSICIAN ASSISTANT

## 2018-09-11 PROCEDURE — 99999 PR PBB SHADOW E&M-EST. PATIENT-LVL III: CPT | Mod: PBBFAC,,, | Performed by: PHYSICIAN ASSISTANT

## 2018-09-11 PROCEDURE — 3008F BODY MASS INDEX DOCD: CPT | Mod: CPTII,S$GLB,, | Performed by: PHYSICIAN ASSISTANT

## 2018-09-11 PROCEDURE — 3078F DIAST BP <80 MM HG: CPT | Mod: CPTII,S$GLB,, | Performed by: PHYSICIAN ASSISTANT

## 2018-09-11 PROCEDURE — 99213 OFFICE O/P EST LOW 20 MIN: CPT | Mod: 25,S$GLB,, | Performed by: PHYSICIAN ASSISTANT

## 2018-09-11 NOTE — PROGRESS NOTES
Subjective:       Patient ID: Janette Lee is a 52 y.o. female.    Chief Complaint: Other (ear cleaning. Pt wants you to look in the throat to see about PND.)    Patient is here to see me today for evaluation of a possible wax impaction in left ear.  She has complaints of hearing loss in the affected ear, but denies pain or drainage.  This has been an issue in the past.  The patient has been using any sort of ear drop to soften the wax. I saw her last week and was unable to get left ear clean. She has been using debrox daily x 1 week.       Review of Systems   Constitutional: Negative for chills, fatigue, fever and unexpected weight change.   HENT: Positive for postnasal drip and sore throat. Negative for congestion, dental problem, ear discharge, ear pain, facial swelling, hearing loss, nosebleeds, rhinorrhea, sinus pressure, sneezing, tinnitus, trouble swallowing and voice change.    Eyes: Negative for redness, itching and visual disturbance.   Respiratory: Negative for cough, choking, shortness of breath and wheezing.    Cardiovascular: Negative for chest pain and palpitations.   Gastrointestinal: Negative for abdominal pain.        No reflux.   Musculoskeletal: Negative for gait problem.   Skin: Negative for rash.   Neurological: Negative for dizziness, light-headedness and headaches.       Objective:      Physical Exam   Constitutional: She is oriented to person, place, and time. She appears well-developed and well-nourished. No distress.   HENT:   Head: Normocephalic and atraumatic.   Right Ear: Tympanic membrane, external ear and ear canal normal. No drainage, swelling or tenderness. No middle ear effusion.   Left Ear: Tympanic membrane, external ear and ear canal normal. No drainage, swelling or tenderness.  No middle ear effusion.   Nose: Nose normal. No mucosal edema, rhinorrhea, nasal deformity or septal deviation. No epistaxis. Right sinus exhibits no maxillary sinus tenderness and no frontal sinus  tenderness. Left sinus exhibits no maxillary sinus tenderness and no frontal sinus tenderness.   Mouth/Throat: Uvula is midline, oropharynx is clear and moist and mucous membranes are normal. Mucous membranes are not pale and not dry. No dental caries. No oropharyngeal exudate or posterior oropharyngeal erythema.   Left cerumen impaction   Eyes: Conjunctivae, EOM and lids are normal. Pupils are equal, round, and reactive to light. Right eye exhibits no chemosis. Left eye exhibits no chemosis. Right conjunctiva is not injected. Left conjunctiva is not injected. No scleral icterus. Right eye exhibits normal extraocular motion and no nystagmus. Left eye exhibits normal extraocular motion and no nystagmus.   Neck: Trachea normal and phonation normal. No tracheal tenderness present. No tracheal deviation present. No thyroid mass and no thyromegaly present.   Cardiovascular: Intact distal pulses.   Pulmonary/Chest: Effort normal. No stridor. No respiratory distress.   Abdominal: She exhibits no distension.   Lymphadenopathy:        Head (right side): No submental, no submandibular, no preauricular, no posterior auricular and no occipital adenopathy present.        Head (left side): No submental, no submandibular, no preauricular, no posterior auricular and no occipital adenopathy present.     She has no cervical adenopathy.   Neurological: She is alert and oriented to person, place, and time. No cranial nerve deficit.   Skin: Skin is warm and dry. No rash noted. No erythema.   Psychiatric: She has a normal mood and affect. Her behavior is normal.          Procedure Note    CHIEF COMPLAINT:  Cerumen Impaction    Description:  The patient was seated in an exam chair.  An ear speculum was placed in the right EAC and was examined under the microscope.  Suction and/or loop curettes were used to remove a large cerumen impaction.  The tympanic membrane was visualized and was normal in appearance.  The procedure was repeated on  the left side in a similar fashion.  The TM was intact and normal on this side as well.  The patient tolerated the procedure well.  Assessment:       1. Impacted cerumen of left ear        Plan:         Cerumen impaction:  Removed today without difficulty.  I would recommend the use of a wax softening drop, either over the counter Debrox or mineral oil, on a weekly basis.  I also instructed the patient to avoid Qtips.

## 2019-03-25 LAB — HUMAN PAPILLOMAVIRUS (HPV): NORMAL

## 2019-05-06 ENCOUNTER — OFFICE VISIT (OUTPATIENT)
Dept: INTERNAL MEDICINE | Facility: CLINIC | Age: 54
End: 2019-05-06
Payer: COMMERCIAL

## 2019-05-06 ENCOUNTER — LAB VISIT (OUTPATIENT)
Dept: LAB | Facility: HOSPITAL | Age: 54
End: 2019-05-06
Payer: COMMERCIAL

## 2019-05-06 VITALS
WEIGHT: 210.13 LBS | HEART RATE: 80 BPM | TEMPERATURE: 97 F | SYSTOLIC BLOOD PRESSURE: 160 MMHG | BODY MASS INDEX: 39.67 KG/M2 | DIASTOLIC BLOOD PRESSURE: 90 MMHG | HEIGHT: 61 IN

## 2019-05-06 DIAGNOSIS — I10 ESSENTIAL HYPERTENSION: ICD-10-CM

## 2019-05-06 DIAGNOSIS — I10 ESSENTIAL HYPERTENSION: Primary | ICD-10-CM

## 2019-05-06 LAB
ALBUMIN SERPL BCP-MCNC: 3.9 G/DL (ref 3.5–5.2)
ALP SERPL-CCNC: 102 U/L (ref 55–135)
ALT SERPL W/O P-5'-P-CCNC: 21 U/L (ref 10–44)
ANION GAP SERPL CALC-SCNC: 10 MMOL/L (ref 8–16)
AST SERPL-CCNC: 18 U/L (ref 10–40)
BASOPHILS # BLD AUTO: 0.05 K/UL (ref 0–0.2)
BASOPHILS NFR BLD: 0.7 % (ref 0–1.9)
BILIRUB SERPL-MCNC: 0.8 MG/DL (ref 0.1–1)
BUN SERPL-MCNC: 16 MG/DL (ref 6–20)
CALCIUM SERPL-MCNC: 9.9 MG/DL (ref 8.7–10.5)
CHLORIDE SERPL-SCNC: 101 MMOL/L (ref 95–110)
CO2 SERPL-SCNC: 30 MMOL/L (ref 23–29)
CREAT SERPL-MCNC: 0.7 MG/DL (ref 0.5–1.4)
DIFFERENTIAL METHOD: ABNORMAL
EOSINOPHIL # BLD AUTO: 0.1 K/UL (ref 0–0.5)
EOSINOPHIL NFR BLD: 1 % (ref 0–8)
ERYTHROCYTE [DISTWIDTH] IN BLOOD BY AUTOMATED COUNT: 12.8 % (ref 11.5–14.5)
EST. GFR  (AFRICAN AMERICAN): >60 ML/MIN/1.73 M^2
EST. GFR  (NON AFRICAN AMERICAN): >60 ML/MIN/1.73 M^2
GLUCOSE SERPL-MCNC: 95 MG/DL (ref 70–110)
HCT VFR BLD AUTO: 41.2 % (ref 37–48.5)
HGB BLD-MCNC: 13.4 G/DL (ref 12–16)
IMM GRANULOCYTES # BLD AUTO: 0.01 K/UL (ref 0–0.04)
IMM GRANULOCYTES NFR BLD AUTO: 0.1 % (ref 0–0.5)
LYMPHOCYTES # BLD AUTO: 2 K/UL (ref 1–4.8)
LYMPHOCYTES NFR BLD: 26.7 % (ref 18–48)
MCH RBC QN AUTO: 31.3 PG (ref 27–31)
MCHC RBC AUTO-ENTMCNC: 32.5 G/DL (ref 32–36)
MCV RBC AUTO: 96 FL (ref 82–98)
MONOCYTES # BLD AUTO: 0.7 K/UL (ref 0.3–1)
MONOCYTES NFR BLD: 9 % (ref 4–15)
NEUTROPHILS # BLD AUTO: 4.6 K/UL (ref 1.8–7.7)
NEUTROPHILS NFR BLD: 62.5 % (ref 38–73)
NRBC BLD-RTO: 0 /100 WBC
PLATELET # BLD AUTO: 278 K/UL (ref 150–350)
PMV BLD AUTO: 10.7 FL (ref 9.2–12.9)
POTASSIUM SERPL-SCNC: 3.9 MMOL/L (ref 3.5–5.1)
PROT SERPL-MCNC: 8 G/DL (ref 6–8.4)
RBC # BLD AUTO: 4.28 M/UL (ref 4–5.4)
SODIUM SERPL-SCNC: 141 MMOL/L (ref 136–145)
TSH SERPL DL<=0.005 MIU/L-ACNC: 1.85 UIU/ML (ref 0.4–4)
WBC # BLD AUTO: 7.33 K/UL (ref 3.9–12.7)

## 2019-05-06 PROCEDURE — 3008F BODY MASS INDEX DOCD: CPT | Mod: CPTII,S$GLB,, | Performed by: INTERNAL MEDICINE

## 2019-05-06 PROCEDURE — 85025 COMPLETE CBC W/AUTO DIFF WBC: CPT

## 2019-05-06 PROCEDURE — 80053 COMPREHEN METABOLIC PANEL: CPT

## 2019-05-06 PROCEDURE — 3080F PR MOST RECENT DIASTOLIC BLOOD PRESSURE >= 90 MM HG: ICD-10-PCS | Mod: CPTII,S$GLB,, | Performed by: INTERNAL MEDICINE

## 2019-05-06 PROCEDURE — 3077F SYST BP >= 140 MM HG: CPT | Mod: CPTII,S$GLB,, | Performed by: INTERNAL MEDICINE

## 2019-05-06 PROCEDURE — 99999 PR PBB SHADOW E&M-EST. PATIENT-LVL III: ICD-10-PCS | Mod: PBBFAC,,, | Performed by: INTERNAL MEDICINE

## 2019-05-06 PROCEDURE — 99999 PR PBB SHADOW E&M-EST. PATIENT-LVL III: CPT | Mod: PBBFAC,,, | Performed by: INTERNAL MEDICINE

## 2019-05-06 PROCEDURE — 36415 COLL VENOUS BLD VENIPUNCTURE: CPT | Mod: PO

## 2019-05-06 PROCEDURE — 3080F DIAST BP >= 90 MM HG: CPT | Mod: CPTII,S$GLB,, | Performed by: INTERNAL MEDICINE

## 2019-05-06 PROCEDURE — 3008F PR BODY MASS INDEX (BMI) DOCUMENTED: ICD-10-PCS | Mod: CPTII,S$GLB,, | Performed by: INTERNAL MEDICINE

## 2019-05-06 PROCEDURE — 99204 PR OFFICE/OUTPT VISIT, NEW, LEVL IV, 45-59 MIN: ICD-10-PCS | Mod: S$GLB,,, | Performed by: INTERNAL MEDICINE

## 2019-05-06 PROCEDURE — 84443 ASSAY THYROID STIM HORMONE: CPT

## 2019-05-06 PROCEDURE — 99204 OFFICE O/P NEW MOD 45 MIN: CPT | Mod: S$GLB,,, | Performed by: INTERNAL MEDICINE

## 2019-05-06 PROCEDURE — 3077F PR MOST RECENT SYSTOLIC BLOOD PRESSURE >= 140 MM HG: ICD-10-PCS | Mod: CPTII,S$GLB,, | Performed by: INTERNAL MEDICINE

## 2019-05-06 RX ORDER — VERAPAMIL HYDROCHLORIDE 360 MG/1
360 CAPSULE, DELAYED RELEASE PELLETS ORAL DAILY
Qty: 30 CAPSULE | Refills: 11 | Status: SHIPPED | OUTPATIENT
Start: 2019-05-06 | End: 2019-08-06 | Stop reason: SDUPTHER

## 2019-05-06 RX ORDER — MELOXICAM 15 MG/1
TABLET ORAL
COMMUNITY
Start: 2019-03-18 | End: 2020-06-09 | Stop reason: DRUGHIGH

## 2019-05-06 RX ORDER — DOXAZOSIN 8 MG/1
TABLET ORAL
Refills: 5 | COMMUNITY
Start: 2019-04-30 | End: 2019-10-29 | Stop reason: SDUPTHER

## 2019-05-06 NOTE — PROGRESS NOTES
"Subjective:       Patient ID: Janette Lee is a 53 y.o. female.    Chief Complaint: Hypertension    HPI Patient is a 53-year-old female presenting days new patient to me.  She has previous seen by and Dr. delacruz.  She presents today with concerns about her blood pressure.  She indicates she has been treated for hypertension for quite a few years and had been stable for some time on losartan hydrochlorothiazide and verapamil.  Recently however her blood pressures started going up and she has had some adjustments to her medications made.  She has recently had Cardura added initially at 4 mg and then escalated 8 mg.  She has not had consistent issues with any sort of anginal or chest pain situation.  She did have a mild right-sided chest pain week or so ago that got her concerned and she went to clinic for that but everything was okay on workup.  In spite of changes to the medications or blood pressure continues to run high and she wanted to come in and sore to get a 2nd opinion and see what we with think of the situation what options we might be able to bring to the table.    Review of Systems   Constitutional: Negative for chills and fever.   HENT: Negative for hearing loss.    Eyes: Negative for photophobia and visual disturbance.   Respiratory: Negative for cough, shortness of breath and wheezing.    Cardiovascular: Negative for chest pain and palpitations.   Gastrointestinal: Negative for blood in stool, constipation, nausea and vomiting.   Genitourinary: Negative for dysuria and hematuria.   Musculoskeletal: Negative for neck pain and neck stiffness.   Skin: Negative for rash.   Neurological: Positive for headaches. Negative for syncope, weakness and light-headedness.   Hematological: Negative for adenopathy.   Psychiatric/Behavioral: Negative for dysphoric mood. The patient is not nervous/anxious.        Objective:   BP (!) 160/90   Pulse 80   Temp 97 °F (36.1 °C) (Tympanic)   Ht 5' 1" (1.549 m)   Wt " 95.3 kg (210 lb 1.6 oz)   BMI 39.70 kg/m²      Physical Exam   Constitutional: She is oriented to person, place, and time. She appears well-developed and well-nourished.   HENT:   Head: Normocephalic and atraumatic.   Eyes: Pupils are equal, round, and reactive to light. EOM are normal.   Neck: Normal range of motion. Neck supple. No thyromegaly present.   Cardiovascular: Normal rate, regular rhythm and intact distal pulses. Exam reveals no gallop and no friction rub.   No murmur heard.  Pulmonary/Chest: Breath sounds normal. She has no wheezes. She has no rales. She exhibits no tenderness.   Abdominal: Soft. Bowel sounds are normal. She exhibits no distension and no mass. There is no tenderness. There is no rebound and no guarding.   Musculoskeletal: She exhibits no edema.   Lymphadenopathy:     She has no cervical adenopathy.   Neurological: She is alert and oriented to person, place, and time. She has normal reflexes. She displays normal reflexes. No cranial nerve deficit.   Skin: Skin is warm and dry. No rash noted.   Psychiatric: She has a normal mood and affect. Her behavior is normal. Judgment normal.   Vitals reviewed.          Assessment:       1. Essential hypertension        Plan:   No problem-specific Assessment & Plan notes found for this encounter.    Essential hypertension  Comments:  Continue losartan/hctz and cardura.  Increase verapamil to 360 mg daily.  check renal artery ultrasound and urine catecholamines/meteanephrines  Orders:  -     TSH; Future; Expected date: 05/06/2019  -     CBC auto differential; Future; Expected date: 05/07/2019  -     Comprehensive metabolic panel; Future; Expected date: 05/06/2019  -     Catecholamines, fractionated, Urine 24 Hours; Future  -     Metanephrines, urine 24 Hours; Future  -     US Renal Artery Stenosis Hyperten (xpd); Future; Expected date: 05/06/2019  -     verapamil (VERELAN) 360 MG C24P; Take 1 capsule (360 mg total) by mouth once daily.  Dispense: 30  capsule; Refill: 11     We discussed the nature of hypertension today in the 80s increase his associated with it.  I do not see any significant problems with her prior treatment.  I think we do need to get a little bit more aggressive however given where her pressures are running.  We will go ahead and increase her verapamil to 360 mg daily.  We will do a renal artery duplex to look for renal artery stenosis as well as check urine 24 hr for catecholamines and metanephrines to evaluate for the possibility of pheochromocytoma.  Plan has been discussed with the patient she expresses understanding.  She will follow up with us in a few weeks.  She will report if the blood pressures continue to run poorly controlled after with the change in the medication.      Follow up in about 3 weeks (around 5/27/2019).

## 2019-05-07 ENCOUNTER — TELEPHONE (OUTPATIENT)
Dept: RADIOLOGY | Facility: HOSPITAL | Age: 54
End: 2019-05-07

## 2019-05-08 ENCOUNTER — PATIENT MESSAGE (OUTPATIENT)
Dept: INTERNAL MEDICINE | Facility: CLINIC | Age: 54
End: 2019-05-08

## 2019-05-08 ENCOUNTER — HOSPITAL ENCOUNTER (OUTPATIENT)
Dept: RADIOLOGY | Facility: HOSPITAL | Age: 54
Discharge: HOME | End: 2019-05-08
Attending: INTERNAL MEDICINE
Payer: COMMERCIAL

## 2019-05-08 ENCOUNTER — APPOINTMENT (OUTPATIENT)
Dept: LAB | Facility: HOSPITAL | Age: 54
End: 2019-05-08
Attending: INTERNAL MEDICINE
Payer: COMMERCIAL

## 2019-05-08 DIAGNOSIS — N28.1 RENAL CYST: Primary | ICD-10-CM

## 2019-05-08 DIAGNOSIS — I10 ESSENTIAL HYPERTENSION: ICD-10-CM

## 2019-05-08 DIAGNOSIS — B69.9 CYSTICERCOSIS: ICD-10-CM

## 2019-05-08 PROCEDURE — 93975 US RENAL ARTERY STENOSIS HYPERTEN (XPD): ICD-10-PCS | Mod: 26,,, | Performed by: RADIOLOGY

## 2019-05-08 PROCEDURE — 93975 VASCULAR STUDY: CPT | Mod: TC

## 2019-05-08 PROCEDURE — 93975 VASCULAR STUDY: CPT | Mod: 26,,, | Performed by: RADIOLOGY

## 2019-05-13 ENCOUNTER — TELEPHONE (OUTPATIENT)
Dept: INTERNAL MEDICINE | Facility: CLINIC | Age: 54
End: 2019-05-13

## 2019-05-13 NOTE — TELEPHONE ENCOUNTER
I'm confused about her results.  Check with the lab and verify the quantity of urine she brought in.  If I am reading this correctly it was only 24 mls which is not a 24 hour collection.  I need to know the accurate quantity.

## 2019-05-14 NOTE — TELEPHONE ENCOUNTER
Got with [facility] in [place].  Its was actually 1200ml.  They are going to recalculate everything and get you the actual the results.

## 2019-05-15 ENCOUNTER — TELEPHONE (OUTPATIENT)
Dept: INTERNAL MEDICINE | Facility: CLINIC | Age: 54
End: 2019-05-15

## 2019-05-15 NOTE — TELEPHONE ENCOUNTER
I resulted the labs this morning.  She needs to have the CT abd/pelvis done.  It was already ordered and is schedule for later this month.

## 2019-05-20 ENCOUNTER — PATIENT MESSAGE (OUTPATIENT)
Dept: INTERNAL MEDICINE | Facility: CLINIC | Age: 54
End: 2019-05-20

## 2019-05-21 ENCOUNTER — HOSPITAL ENCOUNTER (OUTPATIENT)
Dept: RADIOLOGY | Facility: HOSPITAL | Age: 54
Discharge: HOME | End: 2019-05-21
Attending: INTERNAL MEDICINE
Payer: COMMERCIAL

## 2019-05-21 DIAGNOSIS — N28.1 RENAL CYST: ICD-10-CM

## 2019-05-21 PROCEDURE — 74178 CT ABD&PLV WO CNTR FLWD CNTR: CPT | Mod: TC,PO

## 2019-05-21 PROCEDURE — 74178 CT ABD&PLV WO CNTR FLWD CNTR: CPT | Mod: 26,,, | Performed by: RADIOLOGY

## 2019-05-21 PROCEDURE — 74178 CT ABDOMEN PELVIS W WO CONTRAST: ICD-10-PCS | Mod: 26,,, | Performed by: RADIOLOGY

## 2019-05-21 PROCEDURE — 25500020 PHARM REV CODE 255: Mod: PO | Performed by: INTERNAL MEDICINE

## 2019-05-21 RX ORDER — DULOXETIN HYDROCHLORIDE 20 MG/1
20 CAPSULE, DELAYED RELEASE ORAL DAILY
Qty: 30 CAPSULE | Refills: 11 | Status: SHIPPED | OUTPATIENT
Start: 2019-05-21 | End: 2019-08-06 | Stop reason: SDUPTHER

## 2019-05-21 RX ADMIN — IOHEXOL 100 ML: 350 INJECTION, SOLUTION INTRAVENOUS at 10:05

## 2019-05-22 ENCOUNTER — PATIENT MESSAGE (OUTPATIENT)
Dept: INTERNAL MEDICINE | Facility: CLINIC | Age: 54
End: 2019-05-22

## 2019-05-30 ENCOUNTER — OFFICE VISIT (OUTPATIENT)
Dept: ENDOCRINOLOGY | Facility: CLINIC | Age: 54
End: 2019-05-30
Payer: COMMERCIAL

## 2019-05-30 VITALS
DIASTOLIC BLOOD PRESSURE: 82 MMHG | HEIGHT: 61 IN | WEIGHT: 213.94 LBS | SYSTOLIC BLOOD PRESSURE: 140 MMHG | BODY MASS INDEX: 40.39 KG/M2

## 2019-05-30 DIAGNOSIS — D35.02 ADENOMA OF LEFT ADRENAL GLAND: ICD-10-CM

## 2019-05-30 DIAGNOSIS — I10 ESSENTIAL HYPERTENSION: Primary | ICD-10-CM

## 2019-05-30 PROCEDURE — 99244 OFF/OP CNSLTJ NEW/EST MOD 40: CPT | Mod: S$GLB,,, | Performed by: INTERNAL MEDICINE

## 2019-05-30 PROCEDURE — 99999 PR PBB SHADOW E&M-EST. PATIENT-LVL III: CPT | Mod: PBBFAC,,, | Performed by: INTERNAL MEDICINE

## 2019-05-30 PROCEDURE — 99244 PR OFFICE CONSULTATION,LEVEL IV: ICD-10-PCS | Mod: S$GLB,,, | Performed by: INTERNAL MEDICINE

## 2019-05-30 PROCEDURE — 99999 PR PBB SHADOW E&M-EST. PATIENT-LVL III: ICD-10-PCS | Mod: PBBFAC,,, | Performed by: INTERNAL MEDICINE

## 2019-05-30 RX ORDER — DEXAMETHASONE 1 MG/1
TABLET ORAL
Qty: 1 TABLET | Refills: 0 | Status: SHIPPED | OUTPATIENT
Start: 2019-05-30 | End: 2019-10-22

## 2019-05-30 NOTE — PROGRESS NOTES
"Subjective:      Patient ID: Janette Lee is a 53 y.o. female.    Chief Complaint:  Hypertension and Adrenal Adenoma    Referral from Dr. Navarrete    History of Present Illness  Ms. Lee presents for evaluation for endocrine causes of hypertension in the setting of an adrenal adenoma.     Mother and father with HTN in "middle age". Father had stroke in older age. No family history of hypertension at an early age.     Has had HTN since 1993. Was on two bp agents at diagnosis and now on 4 agents.   Bp is controlled on the current regimen.   Reports having been on potassium at some point in the past. Has one low potassium on file at [facility] in 2017. Has been on HCTZ since hypertension diagnosis.     Reports having occasional palpitations lasting a few minutes. Several times per week. Does not have other symptoms associated with the palpitations. Has never checked her blood pressure during the palpitations episodes. No headaches.     No personal history of diabetes or hyperlipidemia. No chronic steroid exposure. Reports 10 lb wt gain in last month. Has thin pearly striae on abdomen. Bruises easily.     CT abdomen with and without contrast 5/21/2019:   There is a small left adrenal gland nodule seen which demonstrates Hounsfield units of 7 and is most compatible with an adenoma.  This measures on the order of 11 mm.  Right adrenal gland demonstrates a normal appearance.    Review of Systems   Constitutional: Positive for unexpected weight change.   HENT: Negative for voice change.    Eyes: Negative for visual disturbance.   Respiratory: Negative for shortness of breath.    Cardiovascular: Negative for chest pain.   Gastrointestinal: Negative for abdominal pain.   Endocrine: Negative for cold intolerance.   Genitourinary: Negative for frequency.   Musculoskeletal: Positive for myalgias.   Skin: Negative for rash.       Objective:   Physical Exam   Constitutional: She is oriented to person, place, and time. She " appears well-developed and well-nourished.   HENT:   Head: Normocephalic and atraumatic.   Right Ear: External ear normal.   Left Ear: External ear normal.   Nose: Nose normal.   Neck: No tracheal deviation present. No thyromegaly present.   Cardiovascular: Normal rate, regular rhythm and normal heart sounds.   No edema   Pulmonary/Chest: Effort normal and breath sounds normal.   Abdominal: Soft. Bowel sounds are normal. There is no tenderness.   Musculoskeletal:   Normal gait, no cyanosis or clubbing   Neurological: She is alert and oriented to person, place, and time. She has normal reflexes.   Vibration sense intact   Skin: Skin is warm and dry. No rash noted.     Thin pearly striae on the abdomen  No increased facial fullness  No acanthosis   Psychiatric: She has a normal mood and affect. Judgment normal.   Vitals reviewed.      Lab Review:   Results for LISSET COLE (MRN [id number]) as of 5/30/2019 07:29   Ref. Range 5/6/2019 13:40   Sodium Latest Ref Range: 136 - 145 mmol/L 141   Potassium Latest Ref Range: 3.5 - 5.1 mmol/L 3.9   Chloride Latest Ref Range: 95 - 110 mmol/L 101   CO2 Latest Ref Range: 23 - 29 mmol/L 30 (H)   Anion Gap Latest Ref Range: 8 - 16 mmol/L 10   BUN, Bld Latest Ref Range: 6 - 20 mg/dL 16   Creatinine Latest Ref Range: 0.5 - 1.4 mg/dL 0.7   eGFR if non African American Latest Ref Range: >60 mL/min/1.73 m^2 >60.0   eGFR if African American Latest Ref Range: >60 mL/min/1.73 m^2 >60.0   Glucose Latest Ref Range: 70 - 110 mg/dL 95   Calcium Latest Ref Range: 8.7 - 10.5 mg/dL 9.9   Alkaline Phosphatase Latest Ref Range: 55 - 135 U/L 102   PROTEIN TOTAL Latest Ref Range: 6.0 - 8.4 g/dL 8.0   Albumin Latest Ref Range: 3.5 - 5.2 g/dL 3.9   BILIRUBIN TOTAL Latest Ref Range: 0.1 - 1.0 mg/dL 0.8   AST Latest Ref Range: 10 - 40 U/L 18   ALT Latest Ref Range: 10 - 44 U/L 21   TSH Latest Ref Range: 0.400 - 4.000 uIU/mL 1.847     Results for LISSET COLE (MRN [id number]) as of 5/30/2019 07:29    Ref. Range 5/8/2019 08:53 5/8/2019 08:53   Epinephrine,  Ur Latest Units: ug/L 3    Norepinephrine,  Ur Latest Units: ug/L 71    Dopamine,  Ur Latest Units: ug/L 219    Creatinine, urine - per 24 hours Latest Ref Range: 500 - 1400 mg/d 1056 1056   Epinephrine, 24H Ur Latest Ref Range: 1 - 7 ug/d 4    Norepinephrine, 24H Ur Latest Ref Range: 16 - 71 ug/d 85 (H)    Dopamine , 24H Ur Latest Ref Range: 77 - 324 ug/d 263    Catechol 24hr Ur interp Unknown See Note    Epinephrine - ratio to CRT Latest Ref Range: 0 - 20 ug/g CRT 3    Creatinine, urine - per volume Latest Units: mg/dL 88 88   Hours Collected Latest Units: hr 24 24   Urine Total Volume Latest Units: mL 1200 1200   Metanephrines, 24H Ur Latest Ref Range: 39 - 143 ug/d  86   Normetanephrine, 24H Ur Latest Ref Range: 109 - 393 ug/d  612 (H)   Metanephrines 24 Hr Interp. Unknown  See Note   Metanephrines, urine - ratio to CRT Latest Ref Range: 0 - 300 ug/g CRT  82   Metanephrines, urine - per volume Latest Units: ug/L  72   Norepinephrine, urine - ratio to CRT Latest Ref Range: 0 - 45 ug/g CRT 81 (H)        Assessment:     1. Essential hypertension    2. Adenoma of left adrenal gland      Plan:     --Patient with hypertension since her 30's on now on 4 bp agents to control bp  --Reviewed imaging features of the left adrenal gland mass and it has characteristics of a benign adrenal adenoma  --Advised her that we will likely repeat a CT of the adrenals in one year to ensure no growth and then no further imaging would be needed from a malignancy standpoint if no change  --Only endocrine secondary workup she has had thus far is 24 hr urine mets  --She was found to have slight elevated of normetanephrine component  --She does not have typical symptoms of a pheo and the imaging characteristics of the adenoma are not consistent with a pheo  --Suspect normet elevation may be due to interference with duloxetine  --Will check plasma metanephrines and if normal this will  r/o pheo  --Will check tato, renin, BMP and 1 mg LDDST to complete workup    Copy to Dr. Landon Felipe M.D. Staff Endocrinology

## 2019-05-30 NOTE — LETTER
May 31, 2019      Nile Navarrete MD  [address] [facility]  [address]  [place] [address]           [facility] - Endocrinology 6th [place]  [address]  [place] [address]  Phone: [phone]          Patient: Janette Lee   MR Number: [id number]   YOB: 1965   Date of Visit: 5/30/2019       Dear Dr. Nile Navarrete:    Thank you for referring Janette Lee to me for evaluation. Attached you will find relevant portions of my assessment and plan of care.    If you have questions, please do not hesitate to call me. I look forward to following Janette Lee along with you.    Sincerely,    Domingo Felipe MD    Enclosure  CC:  No Recipients    If you would like to receive this communication electronically, please contact [email or web address] or [phone] to request more information on [facility] Link access.    For providers and/or their staff who would like to refer a patient to [facility], please contact us through our one-stop-shop provider referral line, [facility] , at [phone].    If you feel you have received this communication in error or would no longer like to receive these types of communications, please e-mail [email or web address]

## 2019-05-31 PROBLEM — D35.02 ADENOMA OF LEFT ADRENAL GLAND: Status: ACTIVE | Noted: 2019-05-31

## 2019-06-04 ENCOUNTER — PATIENT MESSAGE (OUTPATIENT)
Dept: ENDOCRINOLOGY | Facility: CLINIC | Age: 54
End: 2019-06-04

## 2019-06-04 ENCOUNTER — LAB VISIT (OUTPATIENT)
Dept: LAB | Facility: HOSPITAL | Age: 54
End: 2019-06-04
Attending: INTERNAL MEDICINE
Payer: COMMERCIAL

## 2019-06-04 DIAGNOSIS — I10 ESSENTIAL HYPERTENSION: ICD-10-CM

## 2019-06-04 LAB — CORTIS SERPL-MCNC: <1 UG/DL (ref 4.3–22.4)

## 2019-06-04 PROCEDURE — 82024 ASSAY OF ACTH: CPT

## 2019-06-04 PROCEDURE — 82533 TOTAL CORTISOL: CPT

## 2019-06-06 ENCOUNTER — LAB VISIT (OUTPATIENT)
Dept: LAB | Facility: HOSPITAL | Age: 54
End: 2019-06-06
Attending: INTERNAL MEDICINE
Payer: COMMERCIAL

## 2019-06-06 DIAGNOSIS — I10 ESSENTIAL HYPERTENSION: ICD-10-CM

## 2019-06-06 LAB
ANION GAP SERPL CALC-SCNC: 11 MMOL/L (ref 8–16)
BUN SERPL-MCNC: 16 MG/DL (ref 6–20)
CALCIUM SERPL-MCNC: 9.5 MG/DL (ref 8.7–10.5)
CHLORIDE SERPL-SCNC: 101 MMOL/L (ref 95–110)
CHOLEST SERPL-MCNC: 216 MG/DL (ref 120–199)
CHOLEST/HDLC SERPL: 2.7 {RATIO} (ref 2–5)
CO2 SERPL-SCNC: 30 MMOL/L (ref 23–29)
CREAT SERPL-MCNC: 0.7 MG/DL (ref 0.5–1.4)
EST. GFR  (AFRICAN AMERICAN): >60 ML/MIN/1.73 M^2
EST. GFR  (NON AFRICAN AMERICAN): >60 ML/MIN/1.73 M^2
ESTIMATED AVG GLUCOSE: 103 MG/DL (ref 68–131)
GLUCOSE SERPL-MCNC: 104 MG/DL (ref 70–110)
HBA1C MFR BLD HPLC: 5.2 % (ref 4–5.6)
HDLC SERPL-MCNC: 81 MG/DL (ref 40–75)
HDLC SERPL: 37.5 % (ref 20–50)
LDLC SERPL CALC-MCNC: 123.8 MG/DL (ref 63–159)
NONHDLC SERPL-MCNC: 135 MG/DL
POTASSIUM SERPL-SCNC: 3.5 MMOL/L (ref 3.5–5.1)
SODIUM SERPL-SCNC: 142 MMOL/L (ref 136–145)
TRIGL SERPL-MCNC: 56 MG/DL (ref 30–150)

## 2019-06-06 PROCEDURE — 80048 BASIC METABOLIC PNL TOTAL CA: CPT | Mod: PO

## 2019-06-06 PROCEDURE — 83036 HEMOGLOBIN GLYCOSYLATED A1C: CPT

## 2019-06-06 PROCEDURE — 83835 ASSAY OF METANEPHRINES: CPT

## 2019-06-06 PROCEDURE — 84244 ASSAY OF RENIN: CPT

## 2019-06-06 PROCEDURE — 82088 ASSAY OF ALDOSTERONE: CPT

## 2019-06-06 PROCEDURE — 80061 LIPID PANEL: CPT

## 2019-06-07 LAB — ACTH PLAS-MCNC: 22 PG/ML (ref 0–46)

## 2019-06-08 LAB — RENIN PLAS-CCNC: 0.6 NG/ML/H

## 2019-06-10 ENCOUNTER — PATIENT MESSAGE (OUTPATIENT)
Dept: ENDOCRINOLOGY | Facility: CLINIC | Age: 54
End: 2019-06-10

## 2019-06-10 LAB — ALDOST SERPL-MCNC: 12.1 NG/DL

## 2019-06-17 ENCOUNTER — PATIENT MESSAGE (OUTPATIENT)
Dept: ENDOCRINOLOGY | Facility: CLINIC | Age: 54
End: 2019-06-17

## 2019-06-17 DIAGNOSIS — I10 ESSENTIAL HYPERTENSION: Primary | ICD-10-CM

## 2019-06-17 LAB
METANEPH FREE SERPL-MCNC: NORMAL PG/ML
METANEPHS SERPL-MCNC: NORMAL PG/ML
NORMETANEPH FREE SERPL-MCNC: 134 PG/ML

## 2019-06-18 ENCOUNTER — PATIENT MESSAGE (OUTPATIENT)
Dept: ENDOCRINOLOGY | Facility: CLINIC | Age: 54
End: 2019-06-18

## 2019-06-20 ENCOUNTER — LAB VISIT (OUTPATIENT)
Dept: LAB | Facility: HOSPITAL | Age: 54
End: 2019-06-20
Attending: INTERNAL MEDICINE
Payer: COMMERCIAL

## 2019-06-20 DIAGNOSIS — I10 ESSENTIAL HYPERTENSION: ICD-10-CM

## 2019-06-20 LAB
ANION GAP SERPL CALC-SCNC: 9 MMOL/L (ref 8–16)
BUN SERPL-MCNC: 13 MG/DL (ref 6–20)
CALCIUM SERPL-MCNC: 9.4 MG/DL (ref 8.7–10.5)
CHLORIDE SERPL-SCNC: 102 MMOL/L (ref 95–110)
CO2 SERPL-SCNC: 30 MMOL/L (ref 23–29)
CREAT SERPL-MCNC: 0.7 MG/DL (ref 0.5–1.4)
EST. GFR  (AFRICAN AMERICAN): >60 ML/MIN/1.73 M^2
EST. GFR  (NON AFRICAN AMERICAN): >60 ML/MIN/1.73 M^2
GLUCOSE SERPL-MCNC: 107 MG/DL (ref 70–110)
POTASSIUM SERPL-SCNC: 3.8 MMOL/L (ref 3.5–5.1)
SODIUM SERPL-SCNC: 141 MMOL/L (ref 136–145)

## 2019-06-20 PROCEDURE — 84244 ASSAY OF RENIN: CPT

## 2019-06-20 PROCEDURE — 80048 BASIC METABOLIC PNL TOTAL CA: CPT | Mod: PO

## 2019-06-20 PROCEDURE — 36415 COLL VENOUS BLD VENIPUNCTURE: CPT | Mod: PO

## 2019-06-20 PROCEDURE — 82088 ASSAY OF ALDOSTERONE: CPT

## 2019-06-22 LAB — RENIN PLAS-CCNC: 0.9 NG/ML/H

## 2019-06-24 LAB — ALDOST SERPL-MCNC: 15 NG/DL

## 2019-06-25 ENCOUNTER — PATIENT MESSAGE (OUTPATIENT)
Dept: ENDOCRINOLOGY | Facility: CLINIC | Age: 54
End: 2019-06-25

## 2019-08-06 ENCOUNTER — PATIENT MESSAGE (OUTPATIENT)
Dept: INTERNAL MEDICINE | Facility: CLINIC | Age: 54
End: 2019-08-06

## 2019-08-06 DIAGNOSIS — I10 ESSENTIAL HYPERTENSION: ICD-10-CM

## 2019-08-06 RX ORDER — LOSARTAN POTASSIUM AND HYDROCHLOROTHIAZIDE 12.5; 1 MG/1; MG/1
1 TABLET ORAL DAILY
Qty: 90 TABLET | Refills: 3 | Status: SHIPPED | OUTPATIENT
Start: 2019-08-06 | End: 2019-10-22

## 2019-08-06 RX ORDER — DULOXETIN HYDROCHLORIDE 20 MG/1
20 CAPSULE, DELAYED RELEASE ORAL DAILY
Qty: 90 CAPSULE | Refills: 3 | Status: SHIPPED | OUTPATIENT
Start: 2019-08-06 | End: 2019-10-22

## 2019-08-06 RX ORDER — VERAPAMIL HYDROCHLORIDE 360 MG/1
360 CAPSULE, DELAYED RELEASE PELLETS ORAL DAILY
Qty: 90 CAPSULE | Refills: 3 | Status: SHIPPED | OUTPATIENT
Start: 2019-08-06 | End: 2020-08-06

## 2019-10-16 DIAGNOSIS — Z12.39 BREAST CANCER SCREENING: ICD-10-CM

## 2019-10-21 ENCOUNTER — PATIENT MESSAGE (OUTPATIENT)
Dept: ADMINISTRATIVE | Facility: HOSPITAL | Age: 54
End: 2019-10-21

## 2019-10-22 ENCOUNTER — OFFICE VISIT (OUTPATIENT)
Dept: INTERNAL MEDICINE | Facility: CLINIC | Age: 54
End: 2019-10-22
Payer: COMMERCIAL

## 2019-10-22 VITALS
WEIGHT: 215.81 LBS | HEIGHT: 61 IN | TEMPERATURE: 97 F | OXYGEN SATURATION: 97 % | HEART RATE: 76 BPM | BODY MASS INDEX: 40.75 KG/M2 | RESPIRATION RATE: 18 BRPM | DIASTOLIC BLOOD PRESSURE: 90 MMHG | SYSTOLIC BLOOD PRESSURE: 163 MMHG

## 2019-10-22 DIAGNOSIS — I10 ESSENTIAL HYPERTENSION: Primary | ICD-10-CM

## 2019-10-22 DIAGNOSIS — I83.893 VARICOSE VEINS OF BOTH LEGS WITH EDEMA: ICD-10-CM

## 2019-10-22 PROCEDURE — 3078F PR MOST RECENT DIASTOLIC BLOOD PRESSURE < 80 MM HG: ICD-10-PCS | Mod: CPTII,S$GLB,, | Performed by: FAMILY MEDICINE

## 2019-10-22 PROCEDURE — 3077F PR MOST RECENT SYSTOLIC BLOOD PRESSURE >= 140 MM HG: ICD-10-PCS | Mod: CPTII,S$GLB,, | Performed by: FAMILY MEDICINE

## 2019-10-22 PROCEDURE — 99214 PR OFFICE/OUTPT VISIT, EST, LEVL IV, 30-39 MIN: ICD-10-PCS | Mod: S$GLB,,, | Performed by: FAMILY MEDICINE

## 2019-10-22 PROCEDURE — 3078F DIAST BP <80 MM HG: CPT | Mod: CPTII,S$GLB,, | Performed by: FAMILY MEDICINE

## 2019-10-22 PROCEDURE — 99999 PR PBB SHADOW E&M-EST. PATIENT-LVL III: CPT | Mod: PBBFAC,,, | Performed by: FAMILY MEDICINE

## 2019-10-22 PROCEDURE — 99999 PR PBB SHADOW E&M-EST. PATIENT-LVL III: ICD-10-PCS | Mod: PBBFAC,,, | Performed by: FAMILY MEDICINE

## 2019-10-22 PROCEDURE — 99214 OFFICE O/P EST MOD 30 MIN: CPT | Mod: S$GLB,,, | Performed by: FAMILY MEDICINE

## 2019-10-22 PROCEDURE — 3008F PR BODY MASS INDEX (BMI) DOCUMENTED: ICD-10-PCS | Mod: CPTII,S$GLB,, | Performed by: FAMILY MEDICINE

## 2019-10-22 PROCEDURE — 3077F SYST BP >= 140 MM HG: CPT | Mod: CPTII,S$GLB,, | Performed by: FAMILY MEDICINE

## 2019-10-22 PROCEDURE — 3008F BODY MASS INDEX DOCD: CPT | Mod: CPTII,S$GLB,, | Performed by: FAMILY MEDICINE

## 2019-10-22 RX ORDER — DULOXETIN HYDROCHLORIDE 60 MG/1
60 CAPSULE, DELAYED RELEASE ORAL DAILY
Refills: 4 | COMMUNITY
Start: 2019-09-11 | End: 2021-03-22 | Stop reason: SDUPTHER

## 2019-10-22 RX ORDER — CHLORTHALIDONE 25 MG/1
25 TABLET ORAL DAILY
Qty: 90 TABLET | Refills: 3 | Status: SHIPPED | OUTPATIENT
Start: 2019-10-22 | End: 2020-08-13 | Stop reason: SDUPTHER

## 2019-10-22 RX ORDER — VALSARTAN 160 MG/1
160 TABLET ORAL DAILY
Qty: 90 TABLET | Refills: 3 | Status: SHIPPED | OUTPATIENT
Start: 2019-10-22 | End: 2020-04-16

## 2019-10-22 NOTE — ASSESSMENT & PLAN NOTE
Adjusting blood pressure medication.  Switching losartan to valsartan and switching hydrochlorothiazide to chlorthalidone.  Will have her follow up in 2 weeks to recheck blood pressure and monitor electrolytes.  Want to rule out chronically uncontrolled hypertension as part of the reason she is feeling fatigued.  She had normal TSH as well as CBC recently.  She has been postmenopausal for a while now and did not think that her depression is uncontrolled.  Encouraged her to start exercising with walking program.

## 2019-10-22 NOTE — PROGRESS NOTES
Subjective:       Patient ID: Janette Lee is a 53 y.o. female.    Chief Complaint: Fatigue (dizziness); Cyst (lower left leg); and Ear Drainage (dana)    HPI    Knots in LLE: two in calf that are tender. Never felt hot. No pain with ambulation. Massage helped. Has some leg pain within 20 minutes of activity (crampy and achy)    Fatigue: sleeps well at night. Has h/o of using CPAP for sleep apnea. Takes naps during the day. Normal TSH and CBC earlier this year.  Consistent with cymbalta and doesn't feel overly depressed. Not exercising. Sedentary.      Hypertension:  Recently underwent evaluation for resistant hypertension with renal artery ultrasound.  This showed no stenosis but she did have renal cyst that had follow-up using CT scan.  No concerning findings were evident on that study.  Continues take medication as below but having high blood pressure.  Also having dizziness.    Lost job in March of this year.      PVD, blood clots, varicose, dependent edema    Family History   Problem Relation Age of Onset    Hypertension Mother     Diabetes Father     Hypertension Father     Heart disease Father        Current Outpatient Medications:     doxazosin (CARDURA) 8 MG Tab, TAKE ONE TABLET BY MOUTH NIGHTLY, IF BLOOD PRESSURE STILL HIGH AFTER ONE WEEK, DOUBLE THE DOSE TO TWO TABLETS DAILY, Disp: , Rfl: 5    DULoxetine (CYMBALTA) 60 MG capsule, Take 60 mg by mouth once daily., Disp: , Rfl: 4    FLUCELVAX QUAD 7720-3619 60 mcg (15 mcg x 4)/0.5 mL Susp, AS DIRECTED, Disp: , Rfl: 0    meloxicam (MOBIC) 15 MG tablet, TAKE ONE-HALF TABLET BY MOUTH EVERY DAY WITH FOOD, Disp: , Rfl:     verapamil (VERELAN) 360 MG C24P, Take 1 capsule (360 mg total) by mouth once daily., Disp: 90 capsule, Rfl: 3    chlorthalidone (HYGROTEN) 25 MG Tab, Take 1 tablet (25 mg total) by mouth once daily., Disp: 90 tablet, Rfl: 3    valsartan (DIOVAN) 160 MG tablet, Take 1 tablet (160 mg total) by mouth once daily., Disp: 90 tablet,  "Rfl: 3    Review of Systems   Constitutional: Negative for activity change, chills, fever and unexpected weight change.   HENT: Negative for hearing loss, rhinorrhea and trouble swallowing.    Eyes: Negative for discharge and visual disturbance.   Respiratory: Negative for cough, chest tightness, shortness of breath and wheezing.    Cardiovascular: Negative for chest pain and palpitations.   Gastrointestinal: Negative for abdominal pain, blood in stool, constipation, diarrhea and vomiting.   Genitourinary: Negative for difficulty urinating, dysuria, hematuria and menstrual problem.   Musculoskeletal: Positive for arthralgias and joint swelling. Negative for neck pain.   Skin: Negative for rash.   Neurological: Positive for dizziness. Negative for weakness and headaches.   Psychiatric/Behavioral: Negative for confusion and dysphoric mood.       Objective:   BP (!) 163/90 Comment: manual left arm  Pulse 76   Temp 97 °F (36.1 °C) (Tympanic)   Resp 18   Ht 5' 1" (1.549 m)   Wt 97.9 kg (215 lb 13.3 oz)   SpO2 97%   BMI 40.78 kg/m²      Physical Exam   Constitutional: She is oriented to person, place, and time. She appears well-developed and well-nourished.   HENT:   Head: Normocephalic and atraumatic.   Eyes: Conjunctivae are normal.   Cardiovascular: Normal rate.   Pulmonary/Chest: Effort normal. No respiratory distress.   Musculoskeletal: She exhibits edema (  Nonpitting edema of lower extremities.  Some varicose veins present).   Neurological: She is alert and oriented to person, place, and time. Coordination normal.   Skin: Skin is warm and dry. No rash noted.   Psychiatric: She has a normal mood and affect. Her behavior is normal.   Vitals reviewed.      Assessment & Plan     Problem List Items Addressed This Visit        Cardiac/Vascular    Essential hypertension - Primary    Current Assessment & Plan      Adjusting blood pressure medication.  Switching losartan to valsartan and switching hydrochlorothiazide " to chlorthalidone.  Will have her follow up in 2 weeks to recheck blood pressure and monitor electrolytes.  Want to rule out chronically uncontrolled hypertension as part of the reason she is feeling fatigued.  She had normal TSH as well as CBC recently.  She has been postmenopausal for a while now and did not think that her depression is uncontrolled.  Encouraged her to start exercising with walking program.         Relevant Orders    Basic metabolic panel    Varicose veins of both legs with edema    Current Assessment & Plan       Recommended routine exercise and the use of compression stockings.                 Follow up in about 2 weeks (around 11/5/2019) for Hypertension.    Disclaimer:  This note may have been prepared using voice recognition software, it may have not been extensively proofed, as such there could be errors within the text such as sound alike errors.

## 2019-10-29 RX ORDER — DOXAZOSIN 8 MG/1
TABLET ORAL
Qty: 30 TABLET | Refills: 11 | Status: SHIPPED | OUTPATIENT
Start: 2019-10-29 | End: 2020-08-13

## 2019-10-30 ENCOUNTER — PATIENT OUTREACH (OUTPATIENT)
Dept: ADMINISTRATIVE | Facility: HOSPITAL | Age: 54
End: 2019-10-30

## 2019-10-30 DIAGNOSIS — Z11.59 NEED FOR HEPATITIS C SCREENING TEST: Primary | ICD-10-CM

## 2019-11-06 ENCOUNTER — OFFICE VISIT (OUTPATIENT)
Dept: INTERNAL MEDICINE | Facility: CLINIC | Age: 54
End: 2019-11-06
Payer: COMMERCIAL

## 2019-11-06 ENCOUNTER — LAB VISIT (OUTPATIENT)
Dept: LAB | Facility: HOSPITAL | Age: 54
End: 2019-11-06
Attending: FAMILY MEDICINE
Payer: COMMERCIAL

## 2019-11-06 VITALS
TEMPERATURE: 97 F | BODY MASS INDEX: 40.17 KG/M2 | DIASTOLIC BLOOD PRESSURE: 78 MMHG | SYSTOLIC BLOOD PRESSURE: 130 MMHG | HEIGHT: 61 IN | HEART RATE: 73 BPM | OXYGEN SATURATION: 97 % | RESPIRATION RATE: 19 BRPM | WEIGHT: 212.75 LBS

## 2019-11-06 DIAGNOSIS — I10 ESSENTIAL HYPERTENSION: Primary | ICD-10-CM

## 2019-11-06 DIAGNOSIS — Z11.59 NEED FOR HEPATITIS C SCREENING TEST: ICD-10-CM

## 2019-11-06 DIAGNOSIS — I10 ESSENTIAL HYPERTENSION: ICD-10-CM

## 2019-11-06 DIAGNOSIS — I83.893 VARICOSE VEINS OF BOTH LEGS WITH EDEMA: ICD-10-CM

## 2019-11-06 LAB
ANION GAP SERPL CALC-SCNC: 11 MMOL/L (ref 8–16)
BUN SERPL-MCNC: 11 MG/DL (ref 6–20)
CALCIUM SERPL-MCNC: 10.1 MG/DL (ref 8.7–10.5)
CHLORIDE SERPL-SCNC: 96 MMOL/L (ref 95–110)
CO2 SERPL-SCNC: 34 MMOL/L (ref 23–29)
CREAT SERPL-MCNC: 0.7 MG/DL (ref 0.5–1.4)
EST. GFR  (AFRICAN AMERICAN): >60 ML/MIN/1.73 M^2
EST. GFR  (NON AFRICAN AMERICAN): >60 ML/MIN/1.73 M^2
GLUCOSE SERPL-MCNC: 110 MG/DL (ref 70–110)
POTASSIUM SERPL-SCNC: 2.9 MMOL/L (ref 3.5–5.1)
SODIUM SERPL-SCNC: 141 MMOL/L (ref 136–145)

## 2019-11-06 PROCEDURE — 3008F PR BODY MASS INDEX (BMI) DOCUMENTED: ICD-10-PCS | Mod: CPTII,S$GLB,, | Performed by: FAMILY MEDICINE

## 2019-11-06 PROCEDURE — 3075F PR MOST RECENT SYSTOLIC BLOOD PRESS GE 130-139MM HG: ICD-10-PCS | Mod: CPTII,S$GLB,, | Performed by: FAMILY MEDICINE

## 2019-11-06 PROCEDURE — 99999 PR PBB SHADOW E&M-EST. PATIENT-LVL IV: CPT | Mod: PBBFAC,,, | Performed by: FAMILY MEDICINE

## 2019-11-06 PROCEDURE — 3078F DIAST BP <80 MM HG: CPT | Mod: CPTII,S$GLB,, | Performed by: FAMILY MEDICINE

## 2019-11-06 PROCEDURE — 3078F PR MOST RECENT DIASTOLIC BLOOD PRESSURE < 80 MM HG: ICD-10-PCS | Mod: CPTII,S$GLB,, | Performed by: FAMILY MEDICINE

## 2019-11-06 PROCEDURE — 99999 PR PBB SHADOW E&M-EST. PATIENT-LVL IV: ICD-10-PCS | Mod: PBBFAC,,, | Performed by: FAMILY MEDICINE

## 2019-11-06 PROCEDURE — 3075F SYST BP GE 130 - 139MM HG: CPT | Mod: CPTII,S$GLB,, | Performed by: FAMILY MEDICINE

## 2019-11-06 PROCEDURE — 86803 HEPATITIS C AB TEST: CPT

## 2019-11-06 PROCEDURE — 99213 PR OFFICE/OUTPT VISIT, EST, LEVL III, 20-29 MIN: ICD-10-PCS | Mod: S$GLB,,, | Performed by: FAMILY MEDICINE

## 2019-11-06 PROCEDURE — 3008F BODY MASS INDEX DOCD: CPT | Mod: CPTII,S$GLB,, | Performed by: FAMILY MEDICINE

## 2019-11-06 PROCEDURE — 80048 BASIC METABOLIC PNL TOTAL CA: CPT | Mod: PO

## 2019-11-06 PROCEDURE — 36415 COLL VENOUS BLD VENIPUNCTURE: CPT | Mod: PO

## 2019-11-06 PROCEDURE — 99213 OFFICE O/P EST LOW 20 MIN: CPT | Mod: S$GLB,,, | Performed by: FAMILY MEDICINE

## 2019-11-06 NOTE — ASSESSMENT & PLAN NOTE
-Will not make changes to meds today as meds were recently changed and bp is improved. Manual bp 130/78  -encouraged home bp checks  -she is excited about improved bps and overall improved energy level

## 2019-11-06 NOTE — PROGRESS NOTES
Patient ID: Janette Lee is a 53 y.o. female.    Chief Complaint: Hypertension (2 week f/u)    HPI 54 yo F here to follow-up HTN. At last visit losartan was switched to valsartan and hydrochlorothiazide was switched to chlorthalidone. Has been out of town this past week and has not checked home bps. BP after initial med change was 140/90. Says he feel more energizied lately now that bp is better contolled. Feels less fatigued. Denies adverse effects from meds. Plans to start walking daily now that it has gotten cooler outside. Notices that she has less leg pain after wearing compression stockings.     Family History   Problem Relation Age of Onset    Hypertension Mother     Diabetes Father     Hypertension Father     Heart disease Father        Current Outpatient Medications:     chlorthalidone (HYGROTEN) 25 MG Tab, Take 1 tablet (25 mg total) by mouth once daily., Disp: 90 tablet, Rfl: 3    doxazosin (CARDURA) 8 MG Tab, TAKE ONE TABLET BY MOUTH NIGHTLY, IF BLOOD PRESSURE STILL HIGH AFTER ONE WEEK, DOUBLE THE DOSE TO TWO TABLETS DAILY, Disp: 30 tablet, Rfl: 11    DULoxetine (CYMBALTA) 60 MG capsule, Take 60 mg by mouth once daily., Disp: , Rfl: 4    meloxicam (MOBIC) 15 MG tablet, TAKE ONE-HALF TABLET BY MOUTH EVERY DAY WITH FOOD, Disp: , Rfl:     valsartan (DIOVAN) 160 MG tablet, Take 1 tablet (160 mg total) by mouth once daily., Disp: 90 tablet, Rfl: 3    verapamil (VERELAN) 360 MG C24P, Take 1 capsule (360 mg total) by mouth once daily., Disp: 90 capsule, Rfl: 3    FLUCELVAX QUAD [id number] 60 mcg (15 mcg x 4)/0.5 mL Susp, AS DIRECTED, Disp: , Rfl: 0    Review of Systems   Constitutional: Negative for chills and fever.   Eyes: Negative for visual disturbance.   Respiratory: Negative for cough, shortness of breath and wheezing.    Cardiovascular: Negative for chest pain.   Gastrointestinal: Negative for abdominal pain, diarrhea and nausea.   Endocrine: Negative for polydipsia and polyuria.  "  Genitourinary: Negative for dysuria.   Musculoskeletal: Negative for arthralgias.   Skin: Negative for rash.   Neurological: Negative for dizziness and headaches.   Psychiatric/Behavioral: Negative for sleep disturbance. The patient is not nervous/anxious.        Objective:   /78   Pulse 73   Temp 96.9 °F (36.1 °C) (Tympanic)   Resp 19   Ht 5' 1" (1.549 m)   Wt 96.5 kg (212 lb 11.9 oz)   SpO2 97%   BMI 40.20 kg/m²      Physical Exam   Constitutional: She is oriented to person, place, and time. She appears well-developed and well-nourished. No distress.   HENT:   Head: Normocephalic and atraumatic.   Eyes: Conjunctivae are normal.   Neck: Normal range of motion.   Cardiovascular: Normal rate, regular rhythm and normal heart sounds.   No murmur heard.  Pulmonary/Chest: Effort normal and breath sounds normal. No respiratory distress. She has no wheezes.   Abdominal: Soft. Bowel sounds are normal. There is no tenderness.   Musculoskeletal: Normal range of motion.   Compression stockings in place B/L   Neurological: She is alert and oriented to person, place, and time.   Skin: Skin is warm. She is not diaphoretic.   Psychiatric: She has a normal mood and affect. Her behavior is normal.       Assessment & Plan     Problem List Items Addressed This Visit        Cardiac/Vascular    Essential hypertension - Primary    Current Assessment & Plan     -Will not make changes to meds today as meds were recently changed and bp is improved. Manual bp 130/78  -encouraged home bp checks  -she is excited about improved bps and overall improved energy level         Varicose veins of both legs with edema    Current Assessment & Plan     -improved with compression stockings. Continue use                 Follow up in about 3 weeks (around 11/27/2019) for Recheck blood pressure.    "

## 2019-11-07 ENCOUNTER — PATIENT MESSAGE (OUTPATIENT)
Dept: INTERNAL MEDICINE | Facility: CLINIC | Age: 54
End: 2019-11-07

## 2019-11-07 ENCOUNTER — TELEPHONE (OUTPATIENT)
Dept: INTERNAL MEDICINE | Facility: CLINIC | Age: 54
End: 2019-11-07

## 2019-11-07 LAB — HCV AB SERPL QL IA: NEGATIVE

## 2019-11-07 RX ORDER — POTASSIUM CHLORIDE 20 MEQ/1
20 TABLET, EXTENDED RELEASE ORAL DAILY
Qty: 60 TABLET | Refills: 1 | Status: SHIPPED | OUTPATIENT
Start: 2019-11-07 | End: 2020-03-11

## 2019-11-07 NOTE — TELEPHONE ENCOUNTER
Potassium very low yesterday. Needs to come in for repeat in 1 week. Start taking potassium pills today.

## 2019-12-02 ENCOUNTER — PATIENT OUTREACH (OUTPATIENT)
Dept: ADMINISTRATIVE | Facility: HOSPITAL | Age: 54
End: 2019-12-02

## 2019-12-03 ENCOUNTER — PATIENT OUTREACH (OUTPATIENT)
Dept: ADMINISTRATIVE | Facility: HOSPITAL | Age: 54
End: 2019-12-03

## 2019-12-09 ENCOUNTER — PATIENT MESSAGE (OUTPATIENT)
Dept: INTERNAL MEDICINE | Facility: CLINIC | Age: 54
End: 2019-12-09

## 2019-12-09 DIAGNOSIS — E87.6 HYPOKALEMIA: Primary | ICD-10-CM

## 2019-12-10 ENCOUNTER — LAB VISIT (OUTPATIENT)
Dept: LAB | Facility: HOSPITAL | Age: 54
End: 2019-12-10
Attending: FAMILY MEDICINE
Payer: COMMERCIAL

## 2019-12-10 ENCOUNTER — TELEPHONE (OUTPATIENT)
Dept: INTERNAL MEDICINE | Facility: CLINIC | Age: 54
End: 2019-12-10

## 2019-12-10 DIAGNOSIS — E87.6 HYPOKALEMIA: ICD-10-CM

## 2019-12-10 LAB
ANION GAP SERPL CALC-SCNC: 11 MMOL/L (ref 8–16)
BUN SERPL-MCNC: 15 MG/DL (ref 6–20)
CALCIUM SERPL-MCNC: 10.1 MG/DL (ref 8.7–10.5)
CHLORIDE SERPL-SCNC: 98 MMOL/L (ref 95–110)
CO2 SERPL-SCNC: 34 MMOL/L (ref 23–29)
CREAT SERPL-MCNC: 0.7 MG/DL (ref 0.5–1.4)
EST. GFR  (AFRICAN AMERICAN): >60 ML/MIN/1.73 M^2
EST. GFR  (NON AFRICAN AMERICAN): >60 ML/MIN/1.73 M^2
GLUCOSE SERPL-MCNC: 114 MG/DL (ref 70–110)
POTASSIUM SERPL-SCNC: 3.3 MMOL/L (ref 3.5–5.1)
SODIUM SERPL-SCNC: 143 MMOL/L (ref 136–145)

## 2019-12-10 PROCEDURE — 80048 BASIC METABOLIC PNL TOTAL CA: CPT | Mod: PO

## 2019-12-10 PROCEDURE — 36415 COLL VENOUS BLD VENIPUNCTURE: CPT | Mod: PO

## 2019-12-10 NOTE — TELEPHONE ENCOUNTER
----- Message from Curt Childers DO sent at 12/10/2019  9:45 AM CST -----  Potassium better. Continue daily supplement

## 2020-03-11 RX ORDER — POTASSIUM CHLORIDE 20 MEQ/1
TABLET, EXTENDED RELEASE ORAL
Qty: 60 TABLET | Refills: 1 | Status: SHIPPED | OUTPATIENT
Start: 2020-03-11 | End: 2020-04-13 | Stop reason: SDUPTHER

## 2020-03-16 ENCOUNTER — PATIENT MESSAGE (OUTPATIENT)
Dept: INTERNAL MEDICINE | Facility: CLINIC | Age: 55
End: 2020-03-16

## 2020-04-13 ENCOUNTER — PATIENT MESSAGE (OUTPATIENT)
Dept: INTERNAL MEDICINE | Facility: CLINIC | Age: 55
End: 2020-04-13

## 2020-04-13 RX ORDER — POTASSIUM CHLORIDE 20 MEQ/1
20 TABLET, EXTENDED RELEASE ORAL DAILY
Qty: 60 TABLET | Refills: 1 | Status: SHIPPED | OUTPATIENT
Start: 2020-04-13 | End: 2020-08-13 | Stop reason: SDUPTHER

## 2020-04-16 ENCOUNTER — TELEPHONE (OUTPATIENT)
Dept: INTERNAL MEDICINE | Facility: CLINIC | Age: 55
End: 2020-04-16

## 2020-04-16 ENCOUNTER — PATIENT MESSAGE (OUTPATIENT)
Dept: INTERNAL MEDICINE | Facility: CLINIC | Age: 55
End: 2020-04-16

## 2020-04-16 RX ORDER — LOSARTAN POTASSIUM 100 MG/1
100 TABLET ORAL DAILY
Qty: 90 TABLET | Refills: 3 | Status: SHIPPED | OUTPATIENT
Start: 2020-04-16 | End: 2021-03-22 | Stop reason: SDUPTHER

## 2020-04-16 NOTE — TELEPHONE ENCOUNTER
Pharmacy called requesting alternative rx for Valsartan 160 mg be sent. Medication on back order.

## 2020-04-16 NOTE — TELEPHONE ENCOUNTER
Received a message from the patient pharmacy that the valsartan 160 mg is on back order. Please consider changing this medication for the patient ([facility]/All Valsartan out of stock). LOV: 11/06/2019

## 2020-04-30 ENCOUNTER — PATIENT MESSAGE (OUTPATIENT)
Dept: INTERNAL MEDICINE | Facility: CLINIC | Age: 55
End: 2020-04-30

## 2020-05-04 ENCOUNTER — OFFICE VISIT (OUTPATIENT)
Dept: INTERNAL MEDICINE | Facility: CLINIC | Age: 55
End: 2020-05-04
Payer: COMMERCIAL

## 2020-05-04 DIAGNOSIS — B35.1 ONYCHOMYCOSIS: ICD-10-CM

## 2020-05-04 PROCEDURE — 99213 PR OFFICE/OUTPT VISIT, EST, LEVL III, 20-29 MIN: ICD-10-PCS | Mod: 95,,, | Performed by: FAMILY MEDICINE

## 2020-05-04 PROCEDURE — 99213 OFFICE O/P EST LOW 20 MIN: CPT | Mod: 95,,, | Performed by: FAMILY MEDICINE

## 2020-05-04 RX ORDER — CICLOPIROX 80 MG/ML
SOLUTION TOPICAL NIGHTLY
Qty: 6.6 ML | Refills: 0 | Status: SHIPPED | OUTPATIENT
Start: 2020-05-04 | End: 2020-08-04 | Stop reason: ALTCHOICE

## 2020-05-04 NOTE — ASSESSMENT & PLAN NOTE
-discussed monitoring versus tx. Patient would like topical anti-fungal tx as opposed to oral tx to decreased systemic effects  -asked her to let us know if area of discoloration worsens or persists or any other concerns arise  -ciclopirox sent to pharmacy

## 2020-05-04 NOTE — PROGRESS NOTES
" Patient ID: Janette Lee is a 54 y.o. female.    Chief Complaint: Nail Problem    The patient location is: [place]  The chief complaint leading to consultation is: Toe nail "spots"  Visit type: audiovisual  Total time spent with patient: 10 minutes  Each patient to whom he or she provides medical services by telemedicine is:  (1) informed of the relationship between the physician and patient and the respective role of any other health care provider with respect to management of the patient; and (2) notified that he or she may decline to receive medical services by telemedicine and may withdraw from such care at any time.    HPI Patient is 54-year-old female seen for tele visit for evaluation of bilateral black spots on her big toes. Both big toes have thickened nails.  In the past patient has visited nail salons to have toes polished. She denies any inciting event such as trauma or bruising of toes.  Has noticed that as her nails have grown out blackened area has as well.  Denies any pain in her toes.      Family History   Problem Relation Age of Onset    Hypertension Mother     Diabetes Father     Hypertension Father     Heart disease Father        Current Outpatient Medications:     chlorthalidone (HYGROTEN) 25 MG Tab, Take 1 tablet (25 mg total) by mouth once daily., Disp: 90 tablet, Rfl: 3    ciclopirox (PENLAC) 8 % Soln, Apply topically nightly. Apply once daily to affected nail and nail bed. Wipe nail clean with alcohol once weekly. Continue treatment for up to 48 weeks or until nail clearance, Disp: 6.6 mL, Rfl: 0    doxazosin (CARDURA) 8 MG Tab, TAKE ONE TABLET BY MOUTH NIGHTLY, IF BLOOD PRESSURE STILL HIGH AFTER ONE WEEK, DOUBLE THE DOSE TO TWO TABLETS DAILY, Disp: 30 tablet, Rfl: 11    DULoxetine (CYMBALTA) 60 MG capsule, Take 60 mg by mouth once daily., Disp: , Rfl: 4    FLUCELVAX QUAD 4273-8375 60 mcg (15 mcg x 4)/0.5 mL Susp, AS DIRECTED, Disp: , Rfl: 0    losartan (COZAAR) 100 MG " tablet, Take 1 tablet (100 mg total) by mouth once daily., Disp: 90 tablet, Rfl: 3    meloxicam (MOBIC) 15 MG tablet, TAKE ONE-HALF TABLET BY MOUTH EVERY DAY WITH FOOD, Disp: , Rfl:     potassium chloride SA (K-DUR,KLOR-CON) 20 MEQ tablet, Take 1 tablet (20 mEq total) by mouth once daily., Disp: 60 tablet, Rfl: 1    verapamil (VERELAN) 360 MG C24P, Take 1 capsule (360 mg total) by mouth once daily., Disp: 90 capsule, Rfl: 3    Review of Systems   Constitutional: Negative for chills and fever.   Musculoskeletal: Negative for arthralgias and joint swelling.   Skin: Positive for color change. Negative for pallor, rash and wound.   Neurological: Negative for weakness and numbness.   Hematological: Negative for adenopathy. Does not bruise/bleed easily.       Objective:   There were no vitals taken for this visit.     Physical Exam   Constitutional: She is oriented to person, place, and time. She appears well-developed and well-nourished. No distress.   Pulmonary/Chest: Effort normal. No respiratory distress.   Neurological: She is alert and oriented to person, place, and time.   Skin: She is not diaphoretic.   Using video capability I was able to visualize, Bilateral thickened nails of big toes and brownish discoloration of nail   Psychiatric: She has a normal mood and affect. Her behavior is normal. Judgment and thought content normal.       Assessment & Plan     Problem List Items Addressed This Visit        Derm    Onychomycosis    Current Assessment & Plan     -discussed monitoring versus tx. Patient would like topical anti-fungal tx as opposed to oral tx to decreased systemic effects  -asked her to let us know if area of discoloration worsens or persists or any other concerns arise  -ciclopirox sent to pharmacy                 Follow up if symptoms worsen or fail to improve.      Disclaimer:  This note may have been prepared using voice recognition software, without extensive proofreading. As such, there could be  errors within the text such as sound alike errors.

## 2020-05-20 ENCOUNTER — OFFICE VISIT (OUTPATIENT)
Dept: INTERNAL MEDICINE | Facility: CLINIC | Age: 55
End: 2020-05-20
Payer: COMMERCIAL

## 2020-05-20 ENCOUNTER — PATIENT MESSAGE (OUTPATIENT)
Dept: INTERNAL MEDICINE | Facility: CLINIC | Age: 55
End: 2020-05-20

## 2020-05-20 VITALS
BODY MASS INDEX: 42 KG/M2 | RESPIRATION RATE: 12 BRPM | TEMPERATURE: 96 F | HEIGHT: 61 IN | HEART RATE: 80 BPM | OXYGEN SATURATION: 96 % | SYSTOLIC BLOOD PRESSURE: 136 MMHG | WEIGHT: 222.44 LBS | DIASTOLIC BLOOD PRESSURE: 72 MMHG

## 2020-05-20 DIAGNOSIS — I10 ESSENTIAL HYPERTENSION: ICD-10-CM

## 2020-05-20 DIAGNOSIS — G47.33 OSA ON CPAP: ICD-10-CM

## 2020-05-20 DIAGNOSIS — L81.9 DISCOLORATION OF SKIN OF TOE: Primary | ICD-10-CM

## 2020-05-20 PROCEDURE — 3075F SYST BP GE 130 - 139MM HG: CPT | Mod: CPTII,S$GLB,, | Performed by: FAMILY MEDICINE

## 2020-05-20 PROCEDURE — 99214 PR OFFICE/OUTPT VISIT, EST, LEVL IV, 30-39 MIN: ICD-10-PCS | Mod: S$GLB,,, | Performed by: FAMILY MEDICINE

## 2020-05-20 PROCEDURE — 3008F PR BODY MASS INDEX (BMI) DOCUMENTED: ICD-10-PCS | Mod: CPTII,S$GLB,, | Performed by: FAMILY MEDICINE

## 2020-05-20 PROCEDURE — 3078F DIAST BP <80 MM HG: CPT | Mod: CPTII,S$GLB,, | Performed by: FAMILY MEDICINE

## 2020-05-20 PROCEDURE — 99999 PR PBB SHADOW E&M-EST. PATIENT-LVL IV: CPT | Mod: PBBFAC,,, | Performed by: FAMILY MEDICINE

## 2020-05-20 PROCEDURE — 3075F PR MOST RECENT SYSTOLIC BLOOD PRESS GE 130-139MM HG: ICD-10-PCS | Mod: CPTII,S$GLB,, | Performed by: FAMILY MEDICINE

## 2020-05-20 PROCEDURE — 99214 OFFICE O/P EST MOD 30 MIN: CPT | Mod: S$GLB,,, | Performed by: FAMILY MEDICINE

## 2020-05-20 PROCEDURE — 3078F PR MOST RECENT DIASTOLIC BLOOD PRESSURE < 80 MM HG: ICD-10-PCS | Mod: CPTII,S$GLB,, | Performed by: FAMILY MEDICINE

## 2020-05-20 PROCEDURE — 99999 PR PBB SHADOW E&M-EST. PATIENT-LVL IV: ICD-10-PCS | Mod: PBBFAC,,, | Performed by: FAMILY MEDICINE

## 2020-05-20 PROCEDURE — 3008F BODY MASS INDEX DOCD: CPT | Mod: CPTII,S$GLB,, | Performed by: FAMILY MEDICINE

## 2020-05-20 NOTE — PROGRESS NOTES
Patient ID: Janette Lee is a 54 y.o. female.    Chief Complaint: Follow-up (renew handicap tag) and Hypertension    HPI Patient is 54-year-old female here to renew handicap tag.  Gets yearly handicap tag (last 4 years) renewed by her chiropractor however her chiropractor stated she is no longer able to sign for tag.  She reports history of bilateral knee osteoarthritis, lumbar bulging disc and degeneration.  Follows with back and knee specialist at the [facility].  She brought in MRI lumbar spine from 1/2015 that showed scoliosis, bulging disc, disc degeneration and narrowing of neural foramen from L1-L4. Reports back and knee pain wth prolonged ambulation. Has had dry needling, knee scope both knees, PT/OT. Was told she may need knee replacement.     Had MMG and pap smear today at [facility].    Still reports discoloration of her bilateral big toenails. Has been using antifungal lacquer with no improvement.  Would like to see podiatry for 2nd opinion.    Family History   Problem Relation Age of Onset    Hypertension Mother     Diabetes Father     Hypertension Father     Heart disease Father        Current Outpatient Medications:     chlorthalidone (HYGROTEN) 25 MG Tab, Take 1 tablet (25 mg total) by mouth once daily., Disp: 90 tablet, Rfl: 3    ciclopirox (PENLAC) 8 % Soln, Apply topically nightly. Apply once daily to affected nail and nail bed. Wipe nail clean with alcohol once weekly. Continue treatment for up to 48 weeks or until nail clearance, Disp: 6.6 mL, Rfl: 0    doxazosin (CARDURA) 8 MG Tab, TAKE ONE TABLET BY MOUTH NIGHTLY, IF BLOOD PRESSURE STILL HIGH AFTER ONE WEEK, DOUBLE THE DOSE TO TWO TABLETS DAILY, Disp: 30 tablet, Rfl: 11    DULoxetine (CYMBALTA) 60 MG capsule, Take 60 mg by mouth once daily., Disp: , Rfl: 4    losartan (COZAAR) 100 MG tablet, Take 1 tablet (100 mg total) by mouth once daily., Disp: 90 tablet, Rfl: 3    meloxicam (MOBIC) 15 MG tablet,  "TAKE ONE-HALF TABLET BY MOUTH EVERY DAY WITH FOOD, Disp: , Rfl:     potassium chloride SA (K-DUR,KLOR-CON) 20 MEQ tablet, Take 1 tablet (20 mEq total) by mouth once daily., Disp: 60 tablet, Rfl: 1    verapamil (VERELAN) 360 MG C24P, Take 1 capsule (360 mg total) by mouth once daily., Disp: 90 capsule, Rfl: 3    FLUCELVAX QUAD [id number] 60 mcg (15 mcg x 4)/0.5 mL Susp, AS DIRECTED, Disp: , Rfl: 0    Review of Systems   Constitutional: Negative for chills and fever.   HENT: Negative for congestion and sore throat.    Eyes: Negative for visual disturbance.   Respiratory: Negative for cough, shortness of breath and wheezing.    Cardiovascular: Negative for chest pain.   Gastrointestinal: Negative for abdominal pain, diarrhea and nausea.   Endocrine: Negative for polydipsia and polyuria.   Musculoskeletal: Negative for arthralgias.   Skin: Negative for rash.   Neurological: Negative for dizziness and headaches.   Psychiatric/Behavioral: Negative for sleep disturbance. The patient is not nervous/anxious.        Objective:   /72   Pulse 80   Temp 96.4 °F (35.8 °C) (Oral)   Resp 12   Ht 5' 1" (1.549 m)   Wt 100.9 kg (222 lb 7.1 oz)   SpO2 96%   BMI 42.03 kg/m²      Physical Exam   Constitutional: She is oriented to person, place, and time. She appears well-developed and well-nourished.   HENT:   Head: Normocephalic and atraumatic.   Eyes: Conjunctivae are normal.   Neck: Normal range of motion.   Cardiovascular: Normal rate, regular rhythm and normal heart sounds.   Pulmonary/Chest: Effort normal and breath sounds normal.   Abdominal: Soft.   Musculoskeletal: Normal range of motion.   Neurological: She is alert and oriented to person, place, and time.   Skin: Skin is warm.   Bilateral big toes with hyperpigmented nail and thickened toe nails.   Psychiatric: She has a normal mood and affect. Her behavior is normal.       Assessment & Plan     Problem List Items Addressed This Visit        Cardiac/Vascular    " Essential hypertension    Current Assessment & Plan     -bp controlled. Continue current meds            Other    PERFECTO on CPAP    Current Assessment & Plan     -had settings recently, reports improved energy and better sleep           Other Visit Diagnoses     Discoloration of skin of toe    -  Primary    Relevant Orders    Ambulatory referral/consult to Podiatry          Due for labs in one month    Follow up in about 4 weeks (around 6/17/2020) for Hypertension follow-up.      Disclaimer:  This note may have been prepared using voice recognition software, without extensive proofreading. As such, there could be errors within the text such as sound alike errors.

## 2020-06-09 ENCOUNTER — OFFICE VISIT (OUTPATIENT)
Dept: PODIATRY | Facility: CLINIC | Age: 55
End: 2020-06-09
Payer: COMMERCIAL

## 2020-06-09 ENCOUNTER — PATIENT OUTREACH (OUTPATIENT)
Dept: ADMINISTRATIVE | Facility: HOSPITAL | Age: 55
End: 2020-06-09

## 2020-06-09 VITALS
WEIGHT: 219.56 LBS | BODY MASS INDEX: 41.45 KG/M2 | DIASTOLIC BLOOD PRESSURE: 71 MMHG | SYSTOLIC BLOOD PRESSURE: 135 MMHG | HEIGHT: 61 IN | HEART RATE: 71 BPM

## 2020-06-09 DIAGNOSIS — S90.211A SUBUNGUAL HEMATOMA OF GREAT TOE OF RIGHT FOOT, INITIAL ENCOUNTER: ICD-10-CM

## 2020-06-09 DIAGNOSIS — S90.212A SUBUNGUAL HEMATOMA OF GREAT TOE OF LEFT FOOT, INITIAL ENCOUNTER: Primary | ICD-10-CM

## 2020-06-09 DIAGNOSIS — M89.8X7 RETROCALCANEAL EXOSTOSIS: ICD-10-CM

## 2020-06-09 DIAGNOSIS — M92.61 HAGLUND'S DEFORMITY OF RIGHT HEEL: ICD-10-CM

## 2020-06-09 PROCEDURE — 99999 PR PBB SHADOW E&M-EST. PATIENT-LVL IV: CPT | Mod: PBBFAC,,, | Performed by: PODIATRIST

## 2020-06-09 PROCEDURE — 99243 OFF/OP CNSLTJ NEW/EST LOW 30: CPT | Mod: S$GLB,,, | Performed by: PODIATRIST

## 2020-06-09 PROCEDURE — 99999 PR PBB SHADOW E&M-EST. PATIENT-LVL IV: ICD-10-PCS | Mod: PBBFAC,,, | Performed by: PODIATRIST

## 2020-06-09 PROCEDURE — 99243 PR OFFICE CONSULTATION,LEVEL III: ICD-10-PCS | Mod: S$GLB,,, | Performed by: PODIATRIST

## 2020-06-09 RX ORDER — MELOXICAM 7.5 MG/1
TABLET ORAL
COMMUNITY
Start: 2020-03-17 | End: 2020-08-13

## 2020-06-09 NOTE — PROGRESS NOTES
[facility]  PODIATRIC MEDICINE AND SURGERY      CHIEF COMPLAINT   Chief Complaint   Patient presents with    Nail Problem     Nail discoloration, bilateral hallux    Heel Pain     Intermittent right heel pain that radiates to ankle         HPI:    Janette Lee is a 54 y.o. female presenting to podiatry clinic with complaint of discoloration bilateral great toes.  She denies any trauma.  She states she noted discoloration bilateral great toe since November and the nail has continued to grow out distally with the color growing with it.  She also complains about occasional pain to posterior aspect of right heel.  Symptoms are intermittent and radiates from plantar lateral heel to posterior aspect along the Achilles tendon.  She denies any current pain at the site.  She has no further pedal complaints.        PMH  Past Medical History:   Diagnosis Date    Arthritis     Depression     Hypertension     PERFECTO on CPAP 2009       PROBLEM LIST  Patient Active Problem List    Diagnosis Date Noted    PERFECTO on CPAP 05/20/2020    Onychomycosis 05/04/2020    Varicose veins of both legs with edema 10/22/2019    Adenoma of left adrenal gland 05/31/2019    Nonintractable headache 08/06/2017    Essential hypertension 08/06/2017       MEDS  Current Outpatient Medications on File Prior to Visit   Medication Sig Dispense Refill    chlorthalidone (HYGROTEN) 25 MG Tab Take 1 tablet (25 mg total) by mouth once daily. 90 tablet 3    ciclopirox (PENLAC) 8 % Soln Apply topically nightly. Apply once daily to affected nail and nail bed. Wipe nail clean with alcohol once weekly. Continue treatment for up to 48 weeks or until nail clearance 6.6 mL 0    doxazosin (CARDURA) 8 MG Tab TAKE ONE TABLET BY MOUTH NIGHTLY, IF BLOOD PRESSURE STILL HIGH AFTER ONE WEEK, DOUBLE THE DOSE TO TWO TABLETS DAILY 30 tablet 11    DULoxetine (CYMBALTA) 60 MG capsule Take 60 mg by mouth once daily.  4    losartan (COZAAR) 100 MG  "tablet Take 1 tablet (100 mg total) by mouth once daily. 90 tablet 3    potassium chloride SA (K-DUR,KLOR-CON) 20 MEQ tablet Take 1 tablet (20 mEq total) by mouth once daily. 60 tablet 1    verapamil (VERELAN) 360 MG C24P Take 1 capsule (360 mg total) by mouth once daily. 90 capsule 3    FLUCELVAX QUAD [id number] 60 mcg (15 mcg x 4)/0.5 mL Susp AS DIRECTED  0    meloxicam (MOBIC) 7.5 MG tablet TAKE ONE TO TWO TABLETS BY MOUTH ONCE DAILY WITH FOOD       No current facility-administered medications on file prior to visit.        PS     Past Surgical History:   Procedure Laterality Date    arthroscopic surgery Bilateral     knees     SECTION      EYE SURGERY  2017        ALL  Review of patient's allergies indicates:  No Known Allergies    SOC     Social History     Tobacco Use    Smoking status: Never Smoker    Smokeless tobacco: Never Used   Substance Use Topics    Alcohol use: Yes     Frequency: Never     Comment: rarely     Drug use: No         Family HX    Family History   Problem Relation Age of Onset    Hypertension Mother     Diabetes Father     Hypertension Father     Heart disease Father             REVIEW OF SYSTEMS  General: Denies any fever or chills  Chest: Denies shortness of breath, wheezing, coughing, or sputum production  Heart: Denies chest pain, cold extremities, orthopenia, or reduced exercise tolerance  As noted above and per history of current illness above, otherwise negative in the remainder of the 14 systems.      PHYSICAL EXAM:      Vitals:    20 1124   BP: 135/71   Pulse: 71   Weight: 99.6 kg (219 lb 9.3 oz)   Height: 5' 1" (1.549 m)   PainSc: 0-No pain       General: This patient is well-developed, well-nourished and appears stated age, well-oriented to person, place and time, and cooperative and pleasant on today's visit    LOWER EXTREMITY PHYSICAL EXAM  VASCULAR  Dorsalis pedis and posterior tibial pulses palpable 2/4 bilaterally.   Capillary refill time " immediate to the toes.   Feet are warm to the touch. Skin temperature warm to warm from proximally to distally   There are no varicosities, telangiectasias noted to bilateral foot and ankle regions.   There are no ecchymoses noted to bilateral foot and ankle regions.   There is gross lower extremity edema.    DERMATOLOGIC  There is darkness and discoloration medial border of b/l hallux  Skin moist with healthy texture and turgor.  There are no open ulcerations, lacerations, or fissures to bilateral foot and ankle regions. There are no signs of infection as there is no erythema, no proximal-extending lymphangiitis, no fluctuance, or crepitus noted on palpation to bilateral foot and ankle regions.   There is no interdigital maceration.   There are no hyperkeratotic lesions noted to feet.   Subungual hematoma b/l hallux     NEUROLOGIC  Epicritic sensation is intact as the patient is able to sense light touch to bilateral foot and ankle regions.   Achilles and patellar deep tendon reflexes intact  Babinski reflex absent    ORTHOPEDIC/BIOMECHANICAL  TTP posterior RIGHT heel with prominence with palpation   Muscle strength AT/EHL/EDL/PT: 5/5; Achilles/Gastroc/Soleus: 5/5; PB/PL: 5/5 Muscle tone is normal.  Ankle joint ROM INTACT DF/PF, non-crepitus      ASSESSMENT   Subungual hematoma of great toe of left foot, initial encounter    Subungual hematoma of great toe of right foot, initial encounter  -     Ambulatory referral/consult to Podiatry    Retrocalcaneal exostosis - Right Foot    Colleen's deformity of right heel        PLAN    1. Patient was educated about clinical and imaging findings, and verbalizes understanding of above.  2. Discussed subungual hematoma and nail growth     Findings were reviewed and explained condition to the patient with visual reference to foot . At this time patient did agree to proceed with conservative treatment of Retrocalcaneal exostosis, insertional tendonitis. The patient was instructed  to relatively off-load pressure and stresses to the insertion of the Achilles tendon by utilizing heel lifts and adhesive horseshoe pads which were dispensed to the patient. The patient was also given written instructions on stretching and recommended to purchase the [place] sock to the relieve tightness of the heel cord. Patient was also guided on appropriate shoe gear and insoles for which recommendations were made. We also discussed the possibility of surgical excision of the spur with detachment and reattachment of the Achilles tendon in the event that conservative measures failed to improve symptoms. If pain persists, patient may be required to consider more stringent immobilization to rest and offload pull of the Achilles on the posterior calcaneus.     3. RTC  for follow up/evaluation as scheduled      Report Electronically Signed By:     Arely Taylor DPM   Podiatry  [facility]  6/11/2020

## 2020-06-09 NOTE — LETTER
June 11, 2020      Nivia Iyer MD  [address] [address] [address]           [facility]  [address] [facility] [address]  Phone: [phone]  Fax: [phone]          Patient: Janette Lee   MR Number: [id number]   YOB: 1965   Date of Visit: 6/9/2020       Dear Dr. Nivia Iyer:    Thank you for referring Janette Lee to me for evaluation. Attached you will find relevant portions of my assessment and plan of care.    If you have questions, please do not hesitate to call me. I look forward to following Janette Lee along with you.    Sincerely,    Arely Taylor, KHRIS    Enclosure  CC:  No Recipients    If you would like to receive this communication electronically, please contact [email or web address] or [phone] to request more information on [employer] access.    For providers and/or their staff who would like to refer a patient to [facility], please contact us through our one-stop-shop provider referral line, [facility], at [phone].    If you feel you have received this communication in error or would no longer like to receive these types of communications, please e-mail [email or web address]

## 2020-06-09 NOTE — PROGRESS NOTES
HM reviewed and updated. Immunizations abstracted.  Care Everywhere abstracted. CC note updated.  Previsit chart audit completed.  *KDL*

## 2020-06-09 NOTE — PATIENT INSTRUCTIONS
Achilles Tendonitis  Tendinitis is tendon inflammation, the body's natural response to injury or disease, which often causes swelling and pain. Achilles tendinitis results from repetitive stress to the tendon. This often happens when we do too much and too soon, but other factors can make it more likely to develop tendinitis, including increased physical activity, tight calf muscles, and weight gain. There are two types of Achilles tendinitis:    Non-Insertional Achilles Tendonitis  This type involves the mid-portion of the tendon, where the tendon sheath becomes inflamed and eventually adheres to the tendon. As the disease progresses, tendon fibers break down and tiny tears result (degeneration). The tendon swells, thickens, and may cause pain. This type most commonly affects younger, active people.    Insertional Achilles Tendonitis  This type involves the lower / back portion of the heel, where the tendon attaches (inserts) to the bone. Extra bone growth may occur at this site (spurs) and can be painful. This type may occur in patients of any age or activity level.     Nonsurgical Treatment  In most cases, nonsurgical treatment options will provide pain relief, although it may take months for symptoms to disappear. Even with early treatment, the pain may last longer than 3 months. If you have had pain for several months before seeking treatment, it may take 6 months before treatment methods take effect.  1. Rest. The first step in reducing pain is to decrease or even stop the activities that make the pain worse. If you regularly do high-impact exercises (such as running), switching to low-impact activities will put less stress on the Achilles tendon.  2. Ice. Place ice on the most painful area of the Achilles for up to 20 minutes and should be stopped earlier if the skin becomes numb.   3. Non-steroidal anti-inflammatory medication. Drugs such as ibuprofen and naproxen reduce pain and swelling. They do not,  however, reduce the thickening of the degenerated tendon. Do not take the medication for more than 3-4 weeks without consulting your doctor.  4. Orthotics (shoe inserts) or heel lift. Orthotics and / or heel lifts very helpful for patients with insertional tendinitis because they can move the heel away from the back of the shoe, where rubbing can occur. They also take some strain off the tendon.   5. Shoe modifications. Wear a shoe with an open heel counter (open at the back of the heel), which is helpful for insertional tendonitis. A clog may help with both types of tendonitis (the heel lift takes strain off the tendon).   6. Boot or cast protection. If your pain is severe, your doctor may recommend a walking boot or a cast for 4-6 weeks. This gives the tendon a chance to rest before any therapy is begun. Extended use of a boot is discouraged, though, because it can weaken your calf muscle.   7. Physical therapy / focused tendon exercises. Physical therapy is very helpful in treating Achilles tendinitis. It has proven to work better for non-insertional tendinitis than for insertional tendinitis. The following exercises can help to strengthen the calf muscles and reduce stress on the Achilles tendon.    8.   Hamstring stretch   You should try to reach your toes with the leg straight either when seated or standing to stretch the hamstring muscle. Hold the position for 20 seconds and relax. This should be repeated 10 to 15 times per day. Remember that proper stretching is gentle and should not be painful. These exercises need to be done on a consistent basis to be most beneficial.      Eccentric Strengthening Protocol  Eccentric strengthening is defined as rafaela (tightening) a muscle while it is getting longer. Eccentric strengthening exercises can cause damage to the Achilles tendon if they are not done correctly. At first, they should be performed under the supervision of a physical therapist. Once mastered  with a therapist, the exercises can then be done at home. These exercises may cause some discomfort, however, it should not be unbearable.    Surgical Treatment  Surgery should be considered to relieve Achilles tendinitis only if the pain does not improve after 6 months of nonsurgical treatment. The specific type of surgery depends on the location of the tendinitis and the amount of damage to the tendon.       Modified from:  American College of Foot [employer] Ankle Surgeons  American Academy of Orthopedic Surgeons

## 2020-07-14 RX ORDER — METHYLPREDNISOLONE 4 MG/1
TABLET ORAL
Qty: 1 PACKAGE | Refills: 0 | Status: SHIPPED | OUTPATIENT
Start: 2020-07-14 | End: 2020-08-04 | Stop reason: ALTCHOICE

## 2020-08-04 ENCOUNTER — OFFICE VISIT (OUTPATIENT)
Dept: INTERNAL MEDICINE | Facility: CLINIC | Age: 55
End: 2020-08-04
Payer: COMMERCIAL

## 2020-08-04 VITALS
TEMPERATURE: 99 F | HEART RATE: 79 BPM | HEIGHT: 61 IN | OXYGEN SATURATION: 97 % | BODY MASS INDEX: 41.96 KG/M2 | WEIGHT: 222.25 LBS | SYSTOLIC BLOOD PRESSURE: 125 MMHG | DIASTOLIC BLOOD PRESSURE: 59 MMHG

## 2020-08-04 DIAGNOSIS — R30.0 DYSURIA: Primary | ICD-10-CM

## 2020-08-04 DIAGNOSIS — I10 ESSENTIAL HYPERTENSION: ICD-10-CM

## 2020-08-04 PROBLEM — F41.9 ANXIETY: Status: ACTIVE | Noted: 2018-06-19

## 2020-08-04 LAB
BILIRUB SERPL-MCNC: NEGATIVE MG/DL
BLOOD URINE, POC: NEGATIVE
CLARITY, POC UA: ABNORMAL
COLOR, POC UA: YELLOW
GLUCOSE UR QL STRIP: NORMAL
KETONES UR QL STRIP: NEGATIVE
LEUKOCYTE ESTERASE URINE, POC: 3
NITRITE, POC UA: NEGATIVE
PH, POC UA: 8
PROTEIN, POC: ABNORMAL
SPECIFIC GRAVITY, POC UA: 1
UROBILINOGEN, POC UA: NORMAL

## 2020-08-04 PROCEDURE — 3074F SYST BP LT 130 MM HG: CPT | Mod: CPTII,S$GLB,, | Performed by: NURSE PRACTITIONER

## 2020-08-04 PROCEDURE — 81002 URINALYSIS NONAUTO W/O SCOPE: CPT | Mod: S$GLB,,, | Performed by: NURSE PRACTITIONER

## 2020-08-04 PROCEDURE — 87186 SC STD MICRODIL/AGAR DIL: CPT

## 2020-08-04 PROCEDURE — 99214 PR OFFICE/OUTPT VISIT, EST, LEVL IV, 30-39 MIN: ICD-10-PCS | Mod: 25,S$GLB,, | Performed by: NURSE PRACTITIONER

## 2020-08-04 PROCEDURE — 3008F BODY MASS INDEX DOCD: CPT | Mod: CPTII,S$GLB,, | Performed by: NURSE PRACTITIONER

## 2020-08-04 PROCEDURE — 3078F DIAST BP <80 MM HG: CPT | Mod: CPTII,S$GLB,, | Performed by: NURSE PRACTITIONER

## 2020-08-04 PROCEDURE — 87088 URINE BACTERIA CULTURE: CPT

## 2020-08-04 PROCEDURE — 3074F PR MOST RECENT SYSTOLIC BLOOD PRESSURE < 130 MM HG: ICD-10-PCS | Mod: CPTII,S$GLB,, | Performed by: NURSE PRACTITIONER

## 2020-08-04 PROCEDURE — 81002 POCT URINE DIPSTICK WITHOUT MICROSCOPE: ICD-10-PCS | Mod: S$GLB,,, | Performed by: NURSE PRACTITIONER

## 2020-08-04 PROCEDURE — 87086 URINE CULTURE/COLONY COUNT: CPT

## 2020-08-04 PROCEDURE — 99214 OFFICE O/P EST MOD 30 MIN: CPT | Mod: 25,S$GLB,, | Performed by: NURSE PRACTITIONER

## 2020-08-04 PROCEDURE — 3008F PR BODY MASS INDEX (BMI) DOCUMENTED: ICD-10-PCS | Mod: CPTII,S$GLB,, | Performed by: NURSE PRACTITIONER

## 2020-08-04 PROCEDURE — 99999 PR PBB SHADOW E&M-EST. PATIENT-LVL III: ICD-10-PCS | Mod: PBBFAC,,, | Performed by: NURSE PRACTITIONER

## 2020-08-04 PROCEDURE — 99999 PR PBB SHADOW E&M-EST. PATIENT-LVL III: CPT | Mod: PBBFAC,,, | Performed by: NURSE PRACTITIONER

## 2020-08-04 PROCEDURE — 87077 CULTURE AEROBIC IDENTIFY: CPT

## 2020-08-04 PROCEDURE — 3078F PR MOST RECENT DIASTOLIC BLOOD PRESSURE < 80 MM HG: ICD-10-PCS | Mod: CPTII,S$GLB,, | Performed by: NURSE PRACTITIONER

## 2020-08-04 RX ORDER — NITROFURANTOIN 25; 75 MG/1; MG/1
100 CAPSULE ORAL 2 TIMES DAILY
Qty: 10 CAPSULE | Refills: 0 | Status: SHIPPED | OUTPATIENT
Start: 2020-08-04 | End: 2020-08-09

## 2020-08-04 NOTE — ASSESSMENT & PLAN NOTE
POCT UA ++ Leukocytes. Since symptoms have been present for two weeks, will start on macrobid with continuance of symptoms and escalating to bilateral flank pain. Will send for culture.  Negative CVA tenderness, and unremarkable physical exam.

## 2020-08-04 NOTE — PROGRESS NOTES
Subjective:       Patient ID: Janette Lee is a 54 y.o. female.    Chief Complaint: Urinary Tract Infection (back pain, burning and ordor x 2 weeks)    Urinary Tract Infection   This is a new problem. The current episode started in the past 7 days. The problem occurs every urination. The problem has been gradually worsening. The quality of the pain is described as aching and burning. The pain is at a severity of 8/10 (last night). The pain is moderate. There has been no fever. There is no history of pyelonephritis. Associated symptoms include flank pain (bilateral, improved) and urgency. Pertinent negatives include no chills, discharge, frequency, hematuria, hesitancy, nausea, vomiting, bubble bath use, constipation or withholding. She has tried increased fluids for the symptoms. The treatment provided mild relief. Her past medical history is significant for hypertension. There is no history of diabetes mellitus, genitourinary reflux, kidney stones, recurrent UTIs, STD or a urological procedure.       Patient Active Problem List   Diagnosis    Nonintractable headache    Essential hypertension    Adenoma of left adrenal gland    Varicose veins of both legs with edema    Onychomycosis    PERFECTO on CPAP    BMI 40.0-44.9, adult    Anxiety    Dysuria       Family History   Problem Relation Age of Onset    Hypertension Mother     Diabetes Father     Hypertension Father     Heart disease Father      Past Surgical History:   Procedure Laterality Date    arthroscopic surgery Bilateral     knees     SECTION      EYE SURGERY  2017         Current Outpatient Medications:     chlorthalidone (HYGROTEN) 25 MG Tab, Take 1 tablet (25 mg total) by mouth once daily., Disp: 90 tablet, Rfl: 3    doxazosin (CARDURA) 8 MG Tab, TAKE ONE TABLET BY MOUTH NIGHTLY, IF BLOOD PRESSURE STILL HIGH AFTER ONE WEEK, DOUBLE THE DOSE TO TWO TABLETS DAILY, Disp: 30 tablet, Rfl: 11    DULoxetine (CYMBALTA) 60 MG  "capsule, Take 60 mg by mouth once daily., Disp: , Rfl: 4    losartan (COZAAR) 100 MG tablet, Take 1 tablet (100 mg total) by mouth once daily., Disp: 90 tablet, Rfl: 3    meloxicam (MOBIC) 7.5 MG tablet, TAKE ONE TO TWO TABLETS BY MOUTH ONCE DAILY WITH FOOD, Disp: , Rfl:     potassium chloride SA (K-DUR,KLOR-CON) 20 MEQ tablet, Take 1 tablet (20 mEq total) by mouth once daily., Disp: 60 tablet, Rfl: 1    verapamil (VERELAN) 360 MG C24P, Take 1 capsule (360 mg total) by mouth once daily., Disp: 90 capsule, Rfl: 3    nitrofurantoin, macrocrystal-monohydrate, (MACROBID) 100 MG capsule, Take 1 capsule (100 mg total) by mouth 2 (two) times daily. for 5 days, Disp: 10 capsule, Rfl: 0    Review of Systems   Constitutional: Negative for activity change, chills, fatigue, fever and unexpected weight change.   HENT: Negative for hearing loss, rhinorrhea and trouble swallowing.    Eyes: Negative for discharge and visual disturbance.   Respiratory: Negative for chest tightness, shortness of breath and wheezing.    Cardiovascular: Negative for chest pain and palpitations.   Gastrointestinal: Negative for abdominal pain, blood in stool, constipation, diarrhea, nausea and vomiting.   Endocrine: Negative for polydipsia and polyuria.   Genitourinary: Positive for flank pain (bilateral, improved) and urgency. Negative for difficulty urinating, dysuria, frequency, hematuria, hesitancy, menstrual problem, vaginal bleeding, vaginal discharge and vaginal pain.   Musculoskeletal: Positive for back pain. Negative for arthralgias, gait problem, joint swelling and neck pain.   Neurological: Negative for dizziness, weakness, light-headedness, numbness and headaches.   Psychiatric/Behavioral: Negative for confusion and dysphoric mood.       Objective:   BP (!) 125/59   Pulse 79   Temp 98.8 °F (37.1 °C) (Temporal)   Ht 5' 1" (1.549 m)   Wt 100.8 kg (222 lb 3.6 oz)   SpO2 97%   BMI 41.99 kg/m²      Physical Exam  Constitutional:       " "General: She is not in acute distress.     Appearance: Normal appearance. She is obese. She is not ill-appearing.   HENT:      Head: Normocephalic and atraumatic.   Neck:      Musculoskeletal: Normal range of motion and neck supple.   Cardiovascular:      Rate and Rhythm: Normal rate and regular rhythm.      Pulses: Normal pulses.      Heart sounds: Normal heart sounds. No murmur. No friction rub. No gallop.    Pulmonary:      Effort: Pulmonary effort is normal. No respiratory distress.      Breath sounds: Normal breath sounds.   Abdominal:      General: There is no distension.      Palpations: Abdomen is soft.      Tenderness: There is no abdominal tenderness. There is no right CVA tenderness, left CVA tenderness or guarding.   Musculoskeletal: Normal range of motion.         General: No tenderness.   Skin:     General: Skin is warm and dry.   Neurological:      General: No focal deficit present.      Mental Status: She is alert and oriented to person, place, and time.         Assessment & Plan     Problem List Items Addressed This Visit        Cardiac/Vascular    Essential hypertension    Current Assessment & Plan     Blood pressure well controlled. Continue current medications.             Renal/    Dysuria - Primary    Current Assessment & Plan     POCT UA ++ Leukocytes. Since symptoms have been present for two weeks, will start on macrobid with continuance of symptoms and escalating to bilateral flank pain. Will send for culture.  Negative CVA tenderness, and unremarkable physical exam.          Relevant Medications    nitrofurantoin, macrocrystal-monohydrate, (MACROBID) 100 MG capsule    Other Relevant Orders    POCT URINE DIPSTICK WITHOUT MICROSCOPE (Completed)    Urine culture        Follow up if symptoms worsen or fail to improve.          Portions of this note may have been created with voice recognition software. Occasional "wrong-word" or "sound-a-like" substitutions may have occurred due to the " inherent limitations of voice recognition software. Please, read the note carefully and recognize, using context, where substitutions have occurred.

## 2020-08-08 LAB — BACTERIA UR CULT: ABNORMAL

## 2020-08-12 ENCOUNTER — PATIENT OUTREACH (OUTPATIENT)
Dept: ADMINISTRATIVE | Facility: HOSPITAL | Age: 55
End: 2020-08-12

## 2020-08-12 NOTE — PROGRESS NOTES
[facility] uploaded and faxed to [facility] for colonoscopy results and Dr. Tania Yap for mammogram.

## 2020-08-13 ENCOUNTER — OFFICE VISIT (OUTPATIENT)
Dept: INTERNAL MEDICINE | Facility: CLINIC | Age: 55
End: 2020-08-13
Payer: COMMERCIAL

## 2020-08-13 ENCOUNTER — LAB VISIT (OUTPATIENT)
Dept: LAB | Facility: HOSPITAL | Age: 55
End: 2020-08-13
Attending: FAMILY MEDICINE
Payer: COMMERCIAL

## 2020-08-13 VITALS
BODY MASS INDEX: 41.7 KG/M2 | TEMPERATURE: 97 F | DIASTOLIC BLOOD PRESSURE: 70 MMHG | HEART RATE: 76 BPM | HEIGHT: 61 IN | RESPIRATION RATE: 18 BRPM | WEIGHT: 220.88 LBS | OXYGEN SATURATION: 97 % | SYSTOLIC BLOOD PRESSURE: 132 MMHG

## 2020-08-13 DIAGNOSIS — I10 ESSENTIAL HYPERTENSION: ICD-10-CM

## 2020-08-13 DIAGNOSIS — Z00.00 ROUTINE HEALTH MAINTENANCE: ICD-10-CM

## 2020-08-13 DIAGNOSIS — Z00.00 ROUTINE HEALTH MAINTENANCE: Primary | ICD-10-CM

## 2020-08-13 PROBLEM — R30.0 DYSURIA: Status: RESOLVED | Noted: 2020-08-04 | Resolved: 2020-08-13

## 2020-08-13 LAB
ALBUMIN SERPL BCP-MCNC: 3.9 G/DL (ref 3.5–5.2)
ALP SERPL-CCNC: 90 U/L (ref 55–135)
ALT SERPL W/O P-5'-P-CCNC: 25 U/L (ref 10–44)
ANION GAP SERPL CALC-SCNC: 12 MMOL/L (ref 8–16)
AST SERPL-CCNC: 19 U/L (ref 10–40)
BILIRUB SERPL-MCNC: 0.6 MG/DL (ref 0.1–1)
BUN SERPL-MCNC: 12 MG/DL (ref 6–20)
CALCIUM SERPL-MCNC: 9.3 MG/DL (ref 8.7–10.5)
CHLORIDE SERPL-SCNC: 98 MMOL/L (ref 95–110)
CHOLEST SERPL-MCNC: 216 MG/DL (ref 120–199)
CHOLEST/HDLC SERPL: 2.6 {RATIO} (ref 2–5)
CO2 SERPL-SCNC: 30 MMOL/L (ref 23–29)
CREAT SERPL-MCNC: 0.7 MG/DL (ref 0.5–1.4)
EST. GFR  (AFRICAN AMERICAN): >60 ML/MIN/1.73 M^2
EST. GFR  (NON AFRICAN AMERICAN): >60 ML/MIN/1.73 M^2
GLUCOSE SERPL-MCNC: 111 MG/DL (ref 70–110)
HDLC SERPL-MCNC: 84 MG/DL (ref 40–75)
HDLC SERPL: 38.9 % (ref 20–50)
LDLC SERPL CALC-MCNC: 120.8 MG/DL (ref 63–159)
NONHDLC SERPL-MCNC: 132 MG/DL
POTASSIUM SERPL-SCNC: 3.8 MMOL/L (ref 3.5–5.1)
PROT SERPL-MCNC: 8.1 G/DL (ref 6–8.4)
SODIUM SERPL-SCNC: 140 MMOL/L (ref 136–145)
TRIGL SERPL-MCNC: 56 MG/DL (ref 30–150)

## 2020-08-13 PROCEDURE — 3008F PR BODY MASS INDEX (BMI) DOCUMENTED: ICD-10-PCS | Mod: CPTII,S$GLB,, | Performed by: FAMILY MEDICINE

## 2020-08-13 PROCEDURE — 3075F PR MOST RECENT SYSTOLIC BLOOD PRESS GE 130-139MM HG: ICD-10-PCS | Mod: CPTII,S$GLB,, | Performed by: FAMILY MEDICINE

## 2020-08-13 PROCEDURE — 99396 PREV VISIT EST AGE 40-64: CPT | Mod: S$GLB,,, | Performed by: FAMILY MEDICINE

## 2020-08-13 PROCEDURE — 3008F BODY MASS INDEX DOCD: CPT | Mod: CPTII,S$GLB,, | Performed by: FAMILY MEDICINE

## 2020-08-13 PROCEDURE — 36415 COLL VENOUS BLD VENIPUNCTURE: CPT | Mod: PO

## 2020-08-13 PROCEDURE — 80053 COMPREHEN METABOLIC PANEL: CPT | Mod: PO

## 2020-08-13 PROCEDURE — 3075F SYST BP GE 130 - 139MM HG: CPT | Mod: CPTII,S$GLB,, | Performed by: FAMILY MEDICINE

## 2020-08-13 PROCEDURE — 86703 HIV-1/HIV-2 1 RESULT ANTBDY: CPT

## 2020-08-13 PROCEDURE — 80061 LIPID PANEL: CPT

## 2020-08-13 PROCEDURE — 99999 PR PBB SHADOW E&M-EST. PATIENT-LVL IV: CPT | Mod: PBBFAC,,, | Performed by: FAMILY MEDICINE

## 2020-08-13 PROCEDURE — 99999 PR PBB SHADOW E&M-EST. PATIENT-LVL IV: ICD-10-PCS | Mod: PBBFAC,,, | Performed by: FAMILY MEDICINE

## 2020-08-13 PROCEDURE — 3078F DIAST BP <80 MM HG: CPT | Mod: CPTII,S$GLB,, | Performed by: FAMILY MEDICINE

## 2020-08-13 PROCEDURE — 83036 HEMOGLOBIN GLYCOSYLATED A1C: CPT

## 2020-08-13 PROCEDURE — 99396 PR PREVENTIVE VISIT,EST,40-64: ICD-10-PCS | Mod: S$GLB,,, | Performed by: FAMILY MEDICINE

## 2020-08-13 PROCEDURE — 3078F PR MOST RECENT DIASTOLIC BLOOD PRESSURE < 80 MM HG: ICD-10-PCS | Mod: CPTII,S$GLB,, | Performed by: FAMILY MEDICINE

## 2020-08-13 RX ORDER — DOXAZOSIN 4 MG/1
4 TABLET ORAL DAILY
Start: 2020-08-13 | End: 2020-11-23

## 2020-08-13 RX ORDER — POTASSIUM CHLORIDE 20 MEQ/1
20 TABLET, EXTENDED RELEASE ORAL DAILY
Qty: 90 TABLET | Refills: 4 | Status: SHIPPED | OUTPATIENT
Start: 2020-08-13 | End: 2021-09-14

## 2020-08-13 RX ORDER — CHLORTHALIDONE 25 MG/1
25 TABLET ORAL DAILY
Qty: 90 TABLET | Refills: 4 | Status: SHIPPED | OUTPATIENT
Start: 2020-08-13 | End: 2021-03-22 | Stop reason: SDUPTHER

## 2020-08-13 RX ORDER — VERAPAMIL HYDROCHLORIDE 360 MG/1
360 CAPSULE, DELAYED RELEASE PELLETS ORAL DAILY
Qty: 90 CAPSULE | Refills: 4 | Status: SHIPPED | OUTPATIENT
Start: 2020-08-13 | End: 2021-03-22 | Stop reason: SDUPTHER

## 2020-08-13 NOTE — PROGRESS NOTES
Subjective:       Patient ID: Janette Lee is a 54 y.o. female.    Chief Complaint: Annual Exam    HPI  Here today for routine annual exam.  She has been doing well and feels much more energized and has noticed her blood pressure is better controlled since she had recent adjustment of CPAP settings.  It seems that her sleep apnea was inhibiting good blood pressure control.  She is planning to start exercising in efforts to lose weight so she can get off of some of her blood pressure medications.  She had colonoscopy and had a most recent visit she sign release of information so we can get the records from that which happened about a year ago.  She also has had her mammogram and we have requested records from this.    Family History   Problem Relation Age of Onset    Hypertension Mother     Diabetes Father     Hypertension Father     Heart disease Father        Current Outpatient Medications:     chlorthalidone (HYGROTEN) 25 MG Tab, Take 1 tablet (25 mg total) by mouth once daily., Disp: 90 tablet, Rfl: 4    doxazosin (CARDURA) 4 MG tablet, Take 1 tablet (4 mg total) by mouth once daily., Disp: , Rfl:     DULoxetine (CYMBALTA) 60 MG capsule, Take 60 mg by mouth once daily., Disp: , Rfl: 4    losartan (COZAAR) 100 MG tablet, Take 1 tablet (100 mg total) by mouth once daily., Disp: 90 tablet, Rfl: 3    potassium chloride SA (K-DUR,KLOR-CON) 20 MEQ tablet, Take 1 tablet (20 mEq total) by mouth once daily., Disp: 90 tablet, Rfl: 4    verapamiL (VERELAN) 360 MG C24P, Take 1 capsule (360 mg total) by mouth once daily., Disp: 90 capsule, Rfl: 4    Review of Systems   Constitutional: Negative for activity change and unexpected weight change.   HENT: Negative for hearing loss, rhinorrhea and trouble swallowing.    Eyes: Negative for discharge and visual disturbance.   Respiratory: Negative for chest tightness and wheezing.    Cardiovascular: Negative for chest pain and palpitations.   Gastrointestinal: Negative  "for blood in stool, constipation, diarrhea and vomiting.   Endocrine: Negative for polydipsia and polyuria.   Genitourinary: Negative for difficulty urinating, dysuria, hematuria and menstrual problem.   Musculoskeletal: Positive for arthralgias. Negative for joint swelling and neck pain.   Neurological: Negative for weakness and headaches.   Psychiatric/Behavioral: Negative for confusion and dysphoric mood.       Objective:   /70   Pulse 76   Temp 97.3 °F (36.3 °C)   Resp 18   Ht 5' 1" (1.549 m)   Wt 100.2 kg (220 lb 14.4 oz)   SpO2 97%   BMI 41.74 kg/m²      Physical Exam  Vitals signs reviewed.   Constitutional:       Appearance: She is well-developed.   HENT:      Head: Normocephalic and atraumatic.   Eyes:      Conjunctiva/sclera: Conjunctivae normal.   Cardiovascular:      Rate and Rhythm: Normal rate.   Pulmonary:      Effort: Pulmonary effort is normal. No respiratory distress.   Skin:     General: Skin is warm and dry.      Findings: No rash.   Neurological:      Mental Status: She is alert and oriented to person, place, and time.      Coordination: Coordination normal.   Psychiatric:         Behavior: Behavior normal.         Assessment & Plan     Problem List Items Addressed This Visit        Cardiac/Vascular    Essential hypertension    Current Assessment & Plan     In efforts to decrease medication burden she would like to decrease 1 for medications.  I recommended cutting the doxazosin to 4 mg nightly instead of the 8 mg.  She will monitor her over the next couple weeks to be sure that she is not having above goal readings in if it continues to be well controlled may consider discontinuing this medication completely while keeping her on the verapamil, chlorthalidone and losartan.  Continuing on the potassium due to history of hypo kalemia with initiation of chlorthalidone         Relevant Medications    verapamiL (VERELAN) 360 MG C24P       Endocrine    BMI 40.0-44.9, adult    Current " Assessment & Plan     Counseled on importance on diet and exercise to decrease risk of comorbid conditions and for improved quality of life.              Other    Routine health maintenance - Primary    Current Assessment & Plan     Due for routine labs but otherwise up-to-date on health maintenance.         Relevant Orders    Hemoglobin A1C    Comprehensive metabolic panel    Lipid Panel    HIV 1/2 Ag/Ab (4th Gen)            No follow-ups on file.    Disclaimer:  This note may have been prepared using voice recognition software, it may have not been extensively proofed, as such there could be errors within the text such as sound alike errors.

## 2020-08-13 NOTE — ASSESSMENT & PLAN NOTE
In efforts to decrease medication burden she would like to decrease 1 for medications.  I recommended cutting the doxazosin to 4 mg nightly instead of the 8 mg.  She will monitor her over the next couple weeks to be sure that she is not having above goal readings in if it continues to be well controlled may consider discontinuing this medication completely while keeping her on the verapamil, chlorthalidone and losartan.  Continuing on the potassium due to history of hypo kalemia with initiation of chlorthalidone

## 2020-08-14 LAB
ESTIMATED AVG GLUCOSE: 117 MG/DL (ref 68–131)
HBA1C MFR BLD HPLC: 5.7 % (ref 4–5.6)
HIV 1+2 AB+HIV1 P24 AG SERPL QL IA: NEGATIVE

## 2020-08-19 NOTE — PROGRESS NOTES
[facility]  Faxed 2x to [facility] for colonoscopy results and Dr. Tania Yap for mammogram.

## 2020-09-02 NOTE — PROGRESS NOTES
I reached out to [facility] office 1x due to me not receiving a response, Marcela with [facility] will send colonoscopy now. Will follow-up in 1 week.

## 2020-10-05 ENCOUNTER — OFFICE VISIT (OUTPATIENT)
Dept: INTERNAL MEDICINE | Facility: CLINIC | Age: 55
End: 2020-10-05
Payer: COMMERCIAL

## 2020-10-05 ENCOUNTER — PATIENT MESSAGE (OUTPATIENT)
Dept: INTERNAL MEDICINE | Facility: CLINIC | Age: 55
End: 2020-10-05

## 2020-10-05 DIAGNOSIS — R09.81 NASAL CONGESTION: ICD-10-CM

## 2020-10-05 DIAGNOSIS — B96.89 BACTERIAL SINUSITIS: Primary | ICD-10-CM

## 2020-10-05 DIAGNOSIS — J30.9 ALLERGIC RHINITIS, UNSPECIFIED SEASONALITY, UNSPECIFIED TRIGGER: ICD-10-CM

## 2020-10-05 DIAGNOSIS — J32.9 BACTERIAL SINUSITIS: Primary | ICD-10-CM

## 2020-10-05 PROCEDURE — 99214 OFFICE O/P EST MOD 30 MIN: CPT | Mod: 95,,, | Performed by: PHYSICIAN ASSISTANT

## 2020-10-05 PROCEDURE — 99214 PR OFFICE/OUTPT VISIT, EST, LEVL IV, 30-39 MIN: ICD-10-PCS | Mod: 95,,, | Performed by: PHYSICIAN ASSISTANT

## 2020-10-05 RX ORDER — MONTELUKAST SODIUM 10 MG/1
10 TABLET ORAL NIGHTLY
Qty: 30 TABLET | Refills: 0 | Status: SHIPPED | OUTPATIENT
Start: 2020-10-05 | End: 2020-11-04

## 2020-10-05 RX ORDER — AMOXICILLIN AND CLAVULANATE POTASSIUM 875; 125 MG/1; MG/1
1 TABLET, FILM COATED ORAL 2 TIMES DAILY
Qty: 14 TABLET | Refills: 0 | Status: SHIPPED | OUTPATIENT
Start: 2020-10-05 | End: 2020-10-12

## 2020-10-05 NOTE — PROGRESS NOTES
Subjective:      Patient ID: Janette Lee is a 54 y.o. female.    Chief Complaint: No chief complaint on file.    The patient location is: [place]   The chief complaint leading to consultation is: cold    Visit type: audiovisual    Face to Face time with patient: 2:33-3:28  5 minutes of total time spent on the encounter, which includes face to face time and non-face to face time preparing to see the patient (eg, review of tests), Obtaining and/or reviewing separately obtained history, Documenting clinical information in the electronic or other health record, Independently interpreting results (not separately reported) and communicating results to the patient/family/caregiver, or Care coordination (not separately reported).     Each patient to whom he or she provides medical services by telemedicine is:  (1) informed of the relationship between the physician and patient and the respective role of any other health care provider with respect to management of the patient; and (2) notified that he or she may decline to receive medical services by telemedicine and may withdraw from such care at any time.    Notes: Patient is new to me, being seen today for head congestion.  Initially thought it was allergies but not getting any better.     Sinus Problem  This is a new problem. The current episode started 1 to 4 weeks ago. The problem has been gradually worsening since onset. Associated symptoms include congestion, headaches, sinus pressure (frontal) and sneezing. Pertinent negatives include no chills, coughing, diaphoresis, neck pain or shortness of breath. Treatments tried: nose spray, fabian's, claritin. The treatment provided no relief.     Review of Systems   Constitutional: Negative for activity change, chills, diaphoresis, fever and unexpected weight change.   HENT: Positive for congestion, postnasal drip, rhinorrhea, sinus pressure (frontal) and sneezing. Negative for hearing loss and trouble swallowing.     Eyes: Negative for discharge and visual disturbance.   Respiratory: Negative for cough, chest tightness, shortness of breath and wheezing.    Cardiovascular: Negative for chest pain and palpitations.   Gastrointestinal: Negative for blood in stool, constipation, diarrhea and vomiting.   Endocrine: Positive for polydipsia. Negative for polyuria.   Genitourinary: Negative for difficulty urinating, dysuria, hematuria and menstrual problem.   Musculoskeletal: Negative for arthralgias, joint swelling and neck pain.   Neurological: Positive for headaches. Negative for weakness.   Psychiatric/Behavioral: Negative for confusion and dysphoric mood.       Objective:   There were no vitals taken for this visit.  Physical Exam  Constitutional:       General: She is not in acute distress.     Appearance: She is well-developed. She is not ill-appearing or diaphoretic.   HENT:      Head: Normocephalic and atraumatic.      Right Ear: External ear normal.      Left Ear: External ear normal.   Eyes:      General: Lids are normal.         Right eye: No discharge.         Left eye: No discharge.      Conjunctiva/sclera: Conjunctivae normal.      Right eye: Right conjunctiva is not injected.      Left eye: Left conjunctiva is not injected.   Pulmonary:      Effort: Pulmonary effort is normal. No respiratory distress.   Skin:     General: Skin is warm and dry.      Findings: No rash.   Neurological:      Mental Status: She is alert and oriented to person, place, and time.   Psychiatric:         Speech: Speech normal.         Behavior: Behavior normal.         Thought Content: Thought content normal.         Judgment: Judgment normal.       Assessment:      1. Bacterial sinusitis    2. Nasal congestion    3. Allergic rhinitis, unspecified seasonality, unspecified trigger       Plan:   Bacterial sinusitis  -     amoxicillin-clavulanate 875-125mg (AUGMENTIN) 875-125 mg per tablet; Take 1 tablet by mouth 2 (two) times daily. for 7 days   Dispense: 14 tablet; Refill: 0    Nasal congestion  -     COVID-19 Routine Screening; Future; Expected date: 10/05/2020    Allergic rhinitis, unspecified seasonality, unspecified trigger  -     montelukast (SINGULAIR) 10 mg tablet; Take 1 tablet (10 mg total) by mouth every evening.  Dispense: 30 tablet; Refill: 0    R/o  COVID, symptomatic care at this time      Discussed with patient he/she should self quarantine until we have the results of their test     Discussed worsening signs/symptoms and when to return to clinic or go to ED.   Patient expresses understanding and agrees with treatment plan.

## 2020-11-16 PROBLEM — Z00.00 ROUTINE HEALTH MAINTENANCE: Status: RESOLVED | Noted: 2020-08-13 | Resolved: 2020-11-16

## 2020-11-23 ENCOUNTER — OFFICE VISIT (OUTPATIENT)
Dept: INTERNAL MEDICINE | Facility: CLINIC | Age: 55
End: 2020-11-23
Payer: COMMERCIAL

## 2020-11-23 VITALS
SYSTOLIC BLOOD PRESSURE: 136 MMHG | HEIGHT: 61 IN | DIASTOLIC BLOOD PRESSURE: 78 MMHG | HEART RATE: 84 BPM | WEIGHT: 225.06 LBS | TEMPERATURE: 98 F | RESPIRATION RATE: 19 BRPM | BODY MASS INDEX: 42.49 KG/M2 | OXYGEN SATURATION: 96 %

## 2020-11-23 DIAGNOSIS — H60.501 ACUTE OTITIS EXTERNA OF RIGHT EAR, UNSPECIFIED TYPE: Primary | ICD-10-CM

## 2020-11-23 DIAGNOSIS — I10 ESSENTIAL HYPERTENSION: ICD-10-CM

## 2020-11-23 DIAGNOSIS — F41.9 ANXIETY: ICD-10-CM

## 2020-11-23 PROCEDURE — 99999 PR PBB SHADOW E&M-EST. PATIENT-LVL III: CPT | Mod: PBBFAC,,, | Performed by: FAMILY MEDICINE

## 2020-11-23 PROCEDURE — 99999 PR PBB SHADOW E&M-EST. PATIENT-LVL III: ICD-10-PCS | Mod: PBBFAC,,, | Performed by: FAMILY MEDICINE

## 2020-11-23 PROCEDURE — 3078F DIAST BP <80 MM HG: CPT | Mod: CPTII,S$GLB,, | Performed by: FAMILY MEDICINE

## 2020-11-23 PROCEDURE — 99214 PR OFFICE/OUTPT VISIT, EST, LEVL IV, 30-39 MIN: ICD-10-PCS | Mod: S$GLB,,, | Performed by: FAMILY MEDICINE

## 2020-11-23 PROCEDURE — 1125F PR PAIN SEVERITY QUANTIFIED, PAIN PRESENT: ICD-10-PCS | Mod: S$GLB,,, | Performed by: FAMILY MEDICINE

## 2020-11-23 PROCEDURE — 3075F SYST BP GE 130 - 139MM HG: CPT | Mod: CPTII,S$GLB,, | Performed by: FAMILY MEDICINE

## 2020-11-23 PROCEDURE — 3008F BODY MASS INDEX DOCD: CPT | Mod: CPTII,S$GLB,, | Performed by: FAMILY MEDICINE

## 2020-11-23 PROCEDURE — 3075F PR MOST RECENT SYSTOLIC BLOOD PRESS GE 130-139MM HG: ICD-10-PCS | Mod: CPTII,S$GLB,, | Performed by: FAMILY MEDICINE

## 2020-11-23 PROCEDURE — 3078F PR MOST RECENT DIASTOLIC BLOOD PRESSURE < 80 MM HG: ICD-10-PCS | Mod: CPTII,S$GLB,, | Performed by: FAMILY MEDICINE

## 2020-11-23 PROCEDURE — 1125F AMNT PAIN NOTED PAIN PRSNT: CPT | Mod: S$GLB,,, | Performed by: FAMILY MEDICINE

## 2020-11-23 PROCEDURE — 99214 OFFICE O/P EST MOD 30 MIN: CPT | Mod: S$GLB,,, | Performed by: FAMILY MEDICINE

## 2020-11-23 PROCEDURE — 3008F PR BODY MASS INDEX (BMI) DOCUMENTED: ICD-10-PCS | Mod: CPTII,S$GLB,, | Performed by: FAMILY MEDICINE

## 2020-11-23 RX ORDER — NEOMYCIN SULFATE, POLYMYXIN B SULFATE AND HYDROCORTISONE 10; 3.5; 1 MG/ML; MG/ML; [USP'U]/ML
3 SUSPENSION/ DROPS AURICULAR (OTIC) 3 TIMES DAILY
Qty: 10 ML | Refills: 0 | Status: SHIPPED | OUTPATIENT
Start: 2020-11-23 | End: 2021-05-07

## 2020-11-23 NOTE — PROGRESS NOTES
"Subjective:       Patient ID: Janette Lee is a 54 y.o. female.    Chief Complaint: Otalgia    HPI  PRESENTING TODAY WITH NEW ONSET OF RIGHT-SIDED EAR DISCOMFORT AND CONGESTED FEELING.  She has had similar problems in the past couple years ago having to get her ears irrigated.  Not having any drainage right now but feels that it is swollen.  She tried putting some hydrogen peroxide with out any results    Family History   Problem Relation Age of Onset    Hypertension Mother     Diabetes Father     Hypertension Father     Heart disease Father        Current Outpatient Medications:     chlorthalidone (HYGROTEN) 25 MG Tab, Take 1 tablet (25 mg total) by mouth once daily., Disp: 90 tablet, Rfl: 4    DULoxetine (CYMBALTA) 60 MG capsule, Take 60 mg by mouth once daily., Disp: , Rfl: 4    losartan (COZAAR) 100 MG tablet, Take 1 tablet (100 mg total) by mouth once daily., Disp: 90 tablet, Rfl: 3    verapamiL (VERELAN) 360 MG C24P, Take 1 capsule (360 mg total) by mouth once daily., Disp: 90 capsule, Rfl: 4    neomycin-polymyxin-hydrocortisone (CORTISPORIN) 3.5-10,000-1 mg/mL-unit/mL-% otic suspension, Place 3 drops into the right ear 3 (three) times daily., Disp: 10 mL, Rfl: 0    Review of Systems   HENT: Positive for ear pain, hearing loss and rhinorrhea. Negative for ear discharge and sore throat.    Respiratory: Negative for cough.    Gastrointestinal: Negative for abdominal pain, diarrhea and vomiting.   Musculoskeletal: Negative for neck pain.   Skin: Negative for rash.   Neurological: Negative for headaches.       Objective:   /78 (BP Location: Left arm, Patient Position: Sitting, BP Method: Large (Manual))   Pulse 84   Temp 97.7 °F (36.5 °C) (Temporal)   Resp 19   Ht 5' 1" (1.549 m)   Wt 102.1 kg (225 lb 1.4 oz)   SpO2 96%   BMI 42.53 kg/m²      Physical Exam  Vitals signs reviewed.   Constitutional:       Appearance: She is well-developed.   HENT:      Head: Normocephalic and atraumatic. "      Ears:      Comments: Bilateral ears had wax but no complete occlusion.  She did have swelling in the right ear canal with some erythema.  Attempted irrigation without complete resolution of wax.  Unable to visualize tympanic membranes bilaterally  Eyes:      Conjunctiva/sclera: Conjunctivae normal.   Cardiovascular:      Rate and Rhythm: Normal rate.   Pulmonary:      Effort: Pulmonary effort is normal. No respiratory distress.   Skin:     General: Skin is warm and dry.      Findings: No rash.   Neurological:      Mental Status: She is alert and oriented to person, place, and time.      Coordination: Coordination normal.   Psychiatric:         Behavior: Behavior normal.         Assessment & Plan     Problem List Items Addressed This Visit        Psychiatric    Anxiety    Current Assessment & Plan     Stable on cymbalta            ENT    Acute otitis externa of right ear - Primary    Relevant Medications    neomycin-polymyxin-hydrocortisone (CORTISPORIN) 3.5-10,000-1 mg/mL-unit/mL-% otic suspension       Cardiac/Vascular    Essential hypertension    Current Assessment & Plan     Controlled. Continue current meds.                  Immunizations Administered on Date of Encounter - 11/23/2020     No immunizations on file.           No follow-ups on file.    Disclaimer:  This note may have been prepared using voice recognition software, it may have not been extensively proofed, as such there could be errors within the text such as sound alike errors.

## 2021-03-20 ENCOUNTER — PATIENT MESSAGE (OUTPATIENT)
Dept: INTERNAL MEDICINE | Facility: CLINIC | Age: 56
End: 2021-03-20

## 2021-03-22 DIAGNOSIS — I10 ESSENTIAL HYPERTENSION: ICD-10-CM

## 2021-03-22 RX ORDER — CHLORTHALIDONE 25 MG/1
25 TABLET ORAL DAILY
Qty: 90 TABLET | Refills: 0 | Status: SHIPPED | OUTPATIENT
Start: 2021-03-22 | End: 2021-05-07

## 2021-03-22 RX ORDER — DULOXETIN HYDROCHLORIDE 60 MG/1
60 CAPSULE, DELAYED RELEASE ORAL DAILY
Qty: 90 CAPSULE | Refills: 0 | Status: SHIPPED | OUTPATIENT
Start: 2021-03-22 | End: 2022-06-02

## 2021-03-22 RX ORDER — LOSARTAN POTASSIUM 100 MG/1
100 TABLET ORAL DAILY
Qty: 90 TABLET | Refills: 0 | Status: SHIPPED | OUTPATIENT
Start: 2021-03-22 | End: 2021-05-31

## 2021-03-22 RX ORDER — VERAPAMIL HYDROCHLORIDE 360 MG/1
360 CAPSULE, DELAYED RELEASE PELLETS ORAL DAILY
Qty: 90 CAPSULE | Refills: 0 | Status: SHIPPED | OUTPATIENT
Start: 2021-03-22 | End: 2021-06-21

## 2021-04-26 ENCOUNTER — PATIENT MESSAGE (OUTPATIENT)
Dept: INTERNAL MEDICINE | Facility: CLINIC | Age: 56
End: 2021-04-26

## 2021-05-07 ENCOUNTER — OFFICE VISIT (OUTPATIENT)
Dept: INTERNAL MEDICINE | Facility: CLINIC | Age: 56
End: 2021-05-07
Payer: COMMERCIAL

## 2021-05-07 VITALS
RESPIRATION RATE: 18 BRPM | DIASTOLIC BLOOD PRESSURE: 76 MMHG | OXYGEN SATURATION: 98 % | HEIGHT: 61 IN | HEART RATE: 84 BPM | BODY MASS INDEX: 42.92 KG/M2 | SYSTOLIC BLOOD PRESSURE: 114 MMHG | WEIGHT: 227.31 LBS | TEMPERATURE: 98 F

## 2021-05-07 DIAGNOSIS — R68.2 DRY MOUTH: Primary | ICD-10-CM

## 2021-05-07 DIAGNOSIS — G47.33 OSA ON CPAP: ICD-10-CM

## 2021-05-07 DIAGNOSIS — I10 ESSENTIAL HYPERTENSION: ICD-10-CM

## 2021-05-07 PROCEDURE — 3008F BODY MASS INDEX DOCD: CPT | Mod: CPTII,S$GLB,, | Performed by: FAMILY MEDICINE

## 2021-05-07 PROCEDURE — 3008F PR BODY MASS INDEX (BMI) DOCUMENTED: ICD-10-PCS | Mod: CPTII,S$GLB,, | Performed by: FAMILY MEDICINE

## 2021-05-07 PROCEDURE — 1126F AMNT PAIN NOTED NONE PRSNT: CPT | Mod: S$GLB,,, | Performed by: FAMILY MEDICINE

## 2021-05-07 PROCEDURE — 99214 OFFICE O/P EST MOD 30 MIN: CPT | Mod: S$GLB,,, | Performed by: FAMILY MEDICINE

## 2021-05-07 PROCEDURE — 1126F PR PAIN SEVERITY QUANTIFIED, NO PAIN PRESENT: ICD-10-PCS | Mod: S$GLB,,, | Performed by: FAMILY MEDICINE

## 2021-05-07 PROCEDURE — 99214 PR OFFICE/OUTPT VISIT, EST, LEVL IV, 30-39 MIN: ICD-10-PCS | Mod: S$GLB,,, | Performed by: FAMILY MEDICINE

## 2021-05-07 PROCEDURE — 99999 PR PBB SHADOW E&M-EST. PATIENT-LVL IV: CPT | Mod: PBBFAC,,, | Performed by: FAMILY MEDICINE

## 2021-05-07 PROCEDURE — 99999 PR PBB SHADOW E&M-EST. PATIENT-LVL IV: ICD-10-PCS | Mod: PBBFAC,,, | Performed by: FAMILY MEDICINE

## 2021-05-18 ENCOUNTER — PATIENT MESSAGE (OUTPATIENT)
Dept: INTERNAL MEDICINE | Facility: CLINIC | Age: 56
End: 2021-05-18

## 2021-06-01 ENCOUNTER — PATIENT MESSAGE (OUTPATIENT)
Dept: INTERNAL MEDICINE | Facility: CLINIC | Age: 56
End: 2021-06-01

## 2021-06-02 ENCOUNTER — PATIENT MESSAGE (OUTPATIENT)
Dept: INTERNAL MEDICINE | Facility: CLINIC | Age: 56
End: 2021-06-02

## 2021-06-02 DIAGNOSIS — Z12.31 OTHER SCREENING MAMMOGRAM: ICD-10-CM

## 2021-06-02 RX ORDER — CHLORTHALIDONE 25 MG/1
25 TABLET ORAL DAILY
Qty: 90 TABLET | Refills: 3 | Status: SHIPPED | OUTPATIENT
Start: 2021-06-02 | End: 2022-06-02 | Stop reason: SDUPTHER

## 2021-06-03 ENCOUNTER — PATIENT MESSAGE (OUTPATIENT)
Dept: INTERNAL MEDICINE | Facility: CLINIC | Age: 56
End: 2021-06-03

## 2021-06-08 ENCOUNTER — PATIENT OUTREACH (OUTPATIENT)
Dept: ADMINISTRATIVE | Facility: HOSPITAL | Age: 56
End: 2021-06-08

## 2021-09-10 ENCOUNTER — PATIENT MESSAGE (OUTPATIENT)
Dept: INTERNAL MEDICINE | Facility: CLINIC | Age: 56
End: 2021-09-10

## 2021-09-10 DIAGNOSIS — R73.02 IGT (IMPAIRED GLUCOSE TOLERANCE): ICD-10-CM

## 2021-09-10 DIAGNOSIS — I10 ESSENTIAL HYPERTENSION: Primary | ICD-10-CM

## 2021-09-17 ENCOUNTER — LAB VISIT (OUTPATIENT)
Dept: LAB | Facility: HOSPITAL | Age: 56
End: 2021-09-17
Attending: FAMILY MEDICINE
Payer: COMMERCIAL

## 2021-09-17 DIAGNOSIS — I10 ESSENTIAL HYPERTENSION: ICD-10-CM

## 2021-09-17 DIAGNOSIS — R73.02 IGT (IMPAIRED GLUCOSE TOLERANCE): ICD-10-CM

## 2021-09-17 LAB
ALBUMIN SERPL BCP-MCNC: 3.8 G/DL (ref 3.5–5.2)
ALP SERPL-CCNC: 92 U/L (ref 55–135)
ALT SERPL W/O P-5'-P-CCNC: 37 U/L (ref 10–44)
ANION GAP SERPL CALC-SCNC: 13 MMOL/L (ref 8–16)
AST SERPL-CCNC: 23 U/L (ref 10–40)
BILIRUB SERPL-MCNC: 0.6 MG/DL (ref 0.1–1)
BUN SERPL-MCNC: 12 MG/DL (ref 6–20)
CALCIUM SERPL-MCNC: 10 MG/DL (ref 8.7–10.5)
CHLORIDE SERPL-SCNC: 97 MMOL/L (ref 95–110)
CHOLEST SERPL-MCNC: 198 MG/DL (ref 120–199)
CHOLEST/HDLC SERPL: 3 {RATIO} (ref 2–5)
CO2 SERPL-SCNC: 30 MMOL/L (ref 23–29)
CREAT SERPL-MCNC: 0.7 MG/DL (ref 0.5–1.4)
EST. GFR  (AFRICAN AMERICAN): >60 ML/MIN/1.73 M^2
EST. GFR  (NON AFRICAN AMERICAN): >60 ML/MIN/1.73 M^2
GLUCOSE SERPL-MCNC: 116 MG/DL (ref 70–110)
HDLC SERPL-MCNC: 66 MG/DL (ref 40–75)
HDLC SERPL: 33.3 % (ref 20–50)
LDLC SERPL CALC-MCNC: 113.8 MG/DL (ref 63–159)
MAGNESIUM SERPL-MCNC: 2 MG/DL (ref 1.6–2.6)
NONHDLC SERPL-MCNC: 132 MG/DL
POTASSIUM SERPL-SCNC: 3.2 MMOL/L (ref 3.5–5.1)
PROT SERPL-MCNC: 8.2 G/DL (ref 6–8.4)
SODIUM SERPL-SCNC: 140 MMOL/L (ref 136–145)
TRIGL SERPL-MCNC: 91 MG/DL (ref 30–150)

## 2021-09-17 PROCEDURE — 80061 LIPID PANEL: CPT | Performed by: FAMILY MEDICINE

## 2021-09-17 PROCEDURE — 36415 COLL VENOUS BLD VENIPUNCTURE: CPT | Mod: PO | Performed by: FAMILY MEDICINE

## 2021-09-17 PROCEDURE — 80053 COMPREHEN METABOLIC PANEL: CPT | Mod: PO | Performed by: FAMILY MEDICINE

## 2021-09-17 PROCEDURE — 83036 HEMOGLOBIN GLYCOSYLATED A1C: CPT | Performed by: FAMILY MEDICINE

## 2021-09-17 PROCEDURE — 83735 ASSAY OF MAGNESIUM: CPT | Mod: PO | Performed by: FAMILY MEDICINE

## 2021-09-18 LAB
ESTIMATED AVG GLUCOSE: 117 MG/DL (ref 68–131)
HBA1C MFR BLD: 5.7 % (ref 4–5.6)

## 2021-10-05 ENCOUNTER — OFFICE VISIT (OUTPATIENT)
Dept: UROLOGY | Facility: CLINIC | Age: 56
End: 2021-10-05
Payer: COMMERCIAL

## 2021-10-05 VITALS
HEIGHT: 61 IN | DIASTOLIC BLOOD PRESSURE: 80 MMHG | WEIGHT: 220.25 LBS | SYSTOLIC BLOOD PRESSURE: 124 MMHG | BODY MASS INDEX: 41.58 KG/M2

## 2021-10-05 DIAGNOSIS — N28.1 RENAL CYST: Primary | ICD-10-CM

## 2021-10-05 LAB
BILIRUB SERPL-MCNC: NORMAL MG/DL
BLOOD URINE, POC: NORMAL
CLARITY, POC UA: CLEAR
COLOR, POC UA: YELLOW
GLUCOSE UR QL STRIP: NORMAL
KETONES UR QL STRIP: NORMAL
LEUKOCYTE ESTERASE URINE, POC: NORMAL
NITRITE, POC UA: NORMAL
PH, POC UA: 7
POC RESIDUAL URINE VOLUME: 0 ML (ref 0–100)
PROTEIN, POC: NORMAL
SPECIFIC GRAVITY, POC UA: 1.01
UROBILINOGEN, POC UA: NORMAL

## 2021-10-05 PROCEDURE — 3074F PR MOST RECENT SYSTOLIC BLOOD PRESSURE < 130 MM HG: ICD-10-PCS | Mod: CPTII,S$GLB,, | Performed by: UROLOGY

## 2021-10-05 PROCEDURE — 81002 POCT URINE DIPSTICK WITHOUT MICROSCOPE: ICD-10-PCS | Mod: S$GLB,,, | Performed by: UROLOGY

## 2021-10-05 PROCEDURE — 3008F PR BODY MASS INDEX (BMI) DOCUMENTED: ICD-10-PCS | Mod: CPTII,S$GLB,, | Performed by: UROLOGY

## 2021-10-05 PROCEDURE — 4010F PR ACE/ARB THEARPY RXD/TAKEN: ICD-10-PCS | Mod: CPTII,S$GLB,, | Performed by: UROLOGY

## 2021-10-05 PROCEDURE — 99999 PR PBB SHADOW E&M-EST. PATIENT-LVL III: CPT | Mod: PBBFAC,,, | Performed by: UROLOGY

## 2021-10-05 PROCEDURE — 3079F PR MOST RECENT DIASTOLIC BLOOD PRESSURE 80-89 MM HG: ICD-10-PCS | Mod: CPTII,S$GLB,, | Performed by: UROLOGY

## 2021-10-05 PROCEDURE — 3008F BODY MASS INDEX DOCD: CPT | Mod: CPTII,S$GLB,, | Performed by: UROLOGY

## 2021-10-05 PROCEDURE — 1159F PR MEDICATION LIST DOCUMENTED IN MEDICAL RECORD: ICD-10-PCS | Mod: CPTII,S$GLB,, | Performed by: UROLOGY

## 2021-10-05 PROCEDURE — 3074F SYST BP LT 130 MM HG: CPT | Mod: CPTII,S$GLB,, | Performed by: UROLOGY

## 2021-10-05 PROCEDURE — 99203 PR OFFICE/OUTPT VISIT, NEW, LEVL III, 30-44 MIN: ICD-10-PCS | Mod: S$GLB,,, | Performed by: UROLOGY

## 2021-10-05 PROCEDURE — 81002 URINALYSIS NONAUTO W/O SCOPE: CPT | Mod: S$GLB,,, | Performed by: UROLOGY

## 2021-10-05 PROCEDURE — 1159F MED LIST DOCD IN RCRD: CPT | Mod: CPTII,S$GLB,, | Performed by: UROLOGY

## 2021-10-05 PROCEDURE — 99999 PR PBB SHADOW E&M-EST. PATIENT-LVL III: ICD-10-PCS | Mod: PBBFAC,,, | Performed by: UROLOGY

## 2021-10-05 PROCEDURE — 99203 OFFICE O/P NEW LOW 30 MIN: CPT | Mod: S$GLB,,, | Performed by: UROLOGY

## 2021-10-05 PROCEDURE — 51798 POCT BLADDER SCAN: ICD-10-PCS | Mod: S$GLB,,, | Performed by: UROLOGY

## 2021-10-05 PROCEDURE — 51798 US URINE CAPACITY MEASURE: CPT | Mod: S$GLB,,, | Performed by: UROLOGY

## 2021-10-05 PROCEDURE — 4010F ACE/ARB THERAPY RXD/TAKEN: CPT | Mod: CPTII,S$GLB,, | Performed by: UROLOGY

## 2021-10-05 PROCEDURE — 3079F DIAST BP 80-89 MM HG: CPT | Mod: CPTII,S$GLB,, | Performed by: UROLOGY

## 2021-10-05 PROCEDURE — 3044F PR MOST RECENT HEMOGLOBIN A1C LEVEL <7.0%: ICD-10-PCS | Mod: CPTII,S$GLB,, | Performed by: UROLOGY

## 2021-10-05 PROCEDURE — 3044F HG A1C LEVEL LT 7.0%: CPT | Mod: CPTII,S$GLB,, | Performed by: UROLOGY

## 2021-10-22 ENCOUNTER — OFFICE VISIT (OUTPATIENT)
Dept: INTERNAL MEDICINE | Facility: CLINIC | Age: 56
End: 2021-10-22
Payer: COMMERCIAL

## 2021-10-22 VITALS
RESPIRATION RATE: 19 BRPM | DIASTOLIC BLOOD PRESSURE: 78 MMHG | WEIGHT: 223.56 LBS | TEMPERATURE: 98 F | OXYGEN SATURATION: 99 % | HEIGHT: 61 IN | HEART RATE: 72 BPM | SYSTOLIC BLOOD PRESSURE: 138 MMHG | BODY MASS INDEX: 42.21 KG/M2

## 2021-10-22 DIAGNOSIS — R25.2 BILATERAL LEG CRAMPS: ICD-10-CM

## 2021-10-22 DIAGNOSIS — Z01.818 PRE-OP EVALUATION: Primary | ICD-10-CM

## 2021-10-22 DIAGNOSIS — I10 ESSENTIAL HYPERTENSION: ICD-10-CM

## 2021-10-22 DIAGNOSIS — K80.20 CALCULUS OF GALLBLADDER WITHOUT CHOLECYSTITIS WITHOUT OBSTRUCTION: ICD-10-CM

## 2021-10-22 PROCEDURE — 3075F PR MOST RECENT SYSTOLIC BLOOD PRESS GE 130-139MM HG: ICD-10-PCS | Mod: CPTII,S$GLB,, | Performed by: NURSE PRACTITIONER

## 2021-10-22 PROCEDURE — 3078F DIAST BP <80 MM HG: CPT | Mod: CPTII,S$GLB,, | Performed by: NURSE PRACTITIONER

## 2021-10-22 PROCEDURE — 1160F PR REVIEW ALL MEDS BY PRESCRIBER/CLIN PHARMACIST DOCUMENTED: ICD-10-PCS | Mod: CPTII,S$GLB,, | Performed by: NURSE PRACTITIONER

## 2021-10-22 PROCEDURE — 1160F RVW MEDS BY RX/DR IN RCRD: CPT | Mod: CPTII,S$GLB,, | Performed by: NURSE PRACTITIONER

## 2021-10-22 PROCEDURE — 3044F HG A1C LEVEL LT 7.0%: CPT | Mod: CPTII,S$GLB,, | Performed by: NURSE PRACTITIONER

## 2021-10-22 PROCEDURE — 3008F PR BODY MASS INDEX (BMI) DOCUMENTED: ICD-10-PCS | Mod: CPTII,S$GLB,, | Performed by: NURSE PRACTITIONER

## 2021-10-22 PROCEDURE — 90471 IMMUNIZATION ADMIN: CPT | Mod: S$GLB,,, | Performed by: NURSE PRACTITIONER

## 2021-10-22 PROCEDURE — 90686 IIV4 VACC NO PRSV 0.5 ML IM: CPT | Mod: S$GLB,,, | Performed by: NURSE PRACTITIONER

## 2021-10-22 PROCEDURE — 90686 FLU VACCINE (QUAD) GREATER THAN OR EQUAL TO 3YO PRESERVATIVE FREE IM: ICD-10-PCS | Mod: S$GLB,,, | Performed by: NURSE PRACTITIONER

## 2021-10-22 PROCEDURE — 1159F PR MEDICATION LIST DOCUMENTED IN MEDICAL RECORD: ICD-10-PCS | Mod: CPTII,S$GLB,, | Performed by: NURSE PRACTITIONER

## 2021-10-22 PROCEDURE — 3078F PR MOST RECENT DIASTOLIC BLOOD PRESSURE < 80 MM HG: ICD-10-PCS | Mod: CPTII,S$GLB,, | Performed by: NURSE PRACTITIONER

## 2021-10-22 PROCEDURE — 3008F BODY MASS INDEX DOCD: CPT | Mod: CPTII,S$GLB,, | Performed by: NURSE PRACTITIONER

## 2021-10-22 PROCEDURE — 99214 PR OFFICE/OUTPT VISIT, EST, LEVL IV, 30-39 MIN: ICD-10-PCS | Mod: 25,S$GLB,, | Performed by: NURSE PRACTITIONER

## 2021-10-22 PROCEDURE — 3044F PR MOST RECENT HEMOGLOBIN A1C LEVEL <7.0%: ICD-10-PCS | Mod: CPTII,S$GLB,, | Performed by: NURSE PRACTITIONER

## 2021-10-22 PROCEDURE — 90471 FLU VACCINE (QUAD) GREATER THAN OR EQUAL TO 3YO PRESERVATIVE FREE IM: ICD-10-PCS | Mod: S$GLB,,, | Performed by: NURSE PRACTITIONER

## 2021-10-22 PROCEDURE — 99999 PR PBB SHADOW E&M-EST. PATIENT-LVL IV: CPT | Mod: PBBFAC,,, | Performed by: NURSE PRACTITIONER

## 2021-10-22 PROCEDURE — 4010F PR ACE/ARB THEARPY RXD/TAKEN: ICD-10-PCS | Mod: CPTII,S$GLB,, | Performed by: NURSE PRACTITIONER

## 2021-10-22 PROCEDURE — 3075F SYST BP GE 130 - 139MM HG: CPT | Mod: CPTII,S$GLB,, | Performed by: NURSE PRACTITIONER

## 2021-10-22 PROCEDURE — 99214 OFFICE O/P EST MOD 30 MIN: CPT | Mod: 25,S$GLB,, | Performed by: NURSE PRACTITIONER

## 2021-10-22 PROCEDURE — 99999 PR PBB SHADOW E&M-EST. PATIENT-LVL IV: ICD-10-PCS | Mod: PBBFAC,,, | Performed by: NURSE PRACTITIONER

## 2021-10-22 PROCEDURE — 1159F MED LIST DOCD IN RCRD: CPT | Mod: CPTII,S$GLB,, | Performed by: NURSE PRACTITIONER

## 2021-10-22 PROCEDURE — 4010F ACE/ARB THERAPY RXD/TAKEN: CPT | Mod: CPTII,S$GLB,, | Performed by: NURSE PRACTITIONER

## 2021-10-26 ENCOUNTER — PATIENT MESSAGE (OUTPATIENT)
Dept: INTERNAL MEDICINE | Facility: CLINIC | Age: 56
End: 2021-10-26
Payer: COMMERCIAL

## 2021-10-26 PROBLEM — Z01.818 PRE-OP EVALUATION: Status: ACTIVE | Noted: 2021-10-26

## 2021-10-26 PROBLEM — K80.20 CALCULUS OF GALLBLADDER WITHOUT CHOLECYSTITIS WITHOUT OBSTRUCTION: Status: ACTIVE | Noted: 2021-10-26

## 2021-10-27 PROBLEM — R25.2 BILATERAL LEG CRAMPS: Status: ACTIVE | Noted: 2021-10-27

## 2021-11-30 ENCOUNTER — PATIENT MESSAGE (OUTPATIENT)
Dept: INTERNAL MEDICINE | Facility: CLINIC | Age: 56
End: 2021-11-30
Payer: COMMERCIAL

## 2021-12-01 RX ORDER — POTASSIUM CHLORIDE 20 MEQ/1
20 TABLET, EXTENDED RELEASE ORAL DAILY
Qty: 90 TABLET | Refills: 4 | Status: SHIPPED | OUTPATIENT
Start: 2021-12-01 | End: 2022-02-14

## 2021-12-02 ENCOUNTER — PATIENT MESSAGE (OUTPATIENT)
Dept: INTERNAL MEDICINE | Facility: CLINIC | Age: 56
End: 2021-12-02
Payer: COMMERCIAL

## 2021-12-06 ENCOUNTER — PATIENT MESSAGE (OUTPATIENT)
Dept: INTERNAL MEDICINE | Facility: CLINIC | Age: 56
End: 2021-12-06
Payer: COMMERCIAL

## 2021-12-06 ENCOUNTER — PATIENT MESSAGE (OUTPATIENT)
Dept: ORTHOPEDICS | Facility: CLINIC | Age: 56
End: 2021-12-06
Payer: COMMERCIAL

## 2021-12-08 ENCOUNTER — TELEPHONE (OUTPATIENT)
Dept: ORTHOPEDICS | Facility: CLINIC | Age: 56
End: 2021-12-08
Payer: COMMERCIAL

## 2021-12-09 ENCOUNTER — HOSPITAL ENCOUNTER (OUTPATIENT)
Dept: RADIOLOGY | Facility: HOSPITAL | Age: 56
Discharge: HOME | End: 2021-12-09
Attending: PHYSICIAN ASSISTANT
Payer: COMMERCIAL

## 2021-12-09 ENCOUNTER — OFFICE VISIT (OUTPATIENT)
Dept: ORTHOPEDICS | Facility: CLINIC | Age: 56
End: 2021-12-09
Payer: COMMERCIAL

## 2021-12-09 VITALS — WEIGHT: 223 LBS | BODY MASS INDEX: 42.1 KG/M2 | HEIGHT: 61 IN

## 2021-12-09 DIAGNOSIS — M21.162 GENU VARUM OF BOTH LOWER EXTREMITIES: ICD-10-CM

## 2021-12-09 DIAGNOSIS — M21.161 GENU VARUM OF BOTH LOWER EXTREMITIES: ICD-10-CM

## 2021-12-09 DIAGNOSIS — M17.0 BILATERAL PRIMARY OSTEOARTHRITIS OF KNEE: ICD-10-CM

## 2021-12-09 DIAGNOSIS — M17.11 PRIMARY OSTEOARTHRITIS OF RIGHT KNEE: Primary | ICD-10-CM

## 2021-12-09 DIAGNOSIS — M25.561 RIGHT KNEE PAIN, UNSPECIFIED CHRONICITY: Primary | ICD-10-CM

## 2021-12-09 DIAGNOSIS — M25.561 RIGHT KNEE PAIN, UNSPECIFIED CHRONICITY: ICD-10-CM

## 2021-12-09 PROCEDURE — 99204 OFFICE O/P NEW MOD 45 MIN: CPT | Mod: S$GLB,,, | Performed by: PHYSICIAN ASSISTANT

## 2021-12-09 PROCEDURE — 99999 PR PBB SHADOW E&M-EST. PATIENT-LVL III: ICD-10-PCS | Mod: PBBFAC,,, | Performed by: PHYSICIAN ASSISTANT

## 2021-12-09 PROCEDURE — 73564 X-RAY EXAM KNEE 4 OR MORE: CPT | Mod: 26,RT,, | Performed by: RADIOLOGY

## 2021-12-09 PROCEDURE — 73562 X-RAY EXAM OF KNEE 3: CPT | Mod: 26,LT,, | Performed by: RADIOLOGY

## 2021-12-09 PROCEDURE — 99204 PR OFFICE/OUTPT VISIT, NEW, LEVL IV, 45-59 MIN: ICD-10-PCS | Mod: S$GLB,,, | Performed by: PHYSICIAN ASSISTANT

## 2021-12-09 PROCEDURE — 99999 PR PBB SHADOW E&M-EST. PATIENT-LVL III: CPT | Mod: PBBFAC,,, | Performed by: PHYSICIAN ASSISTANT

## 2021-12-09 PROCEDURE — 73564 XR KNEE ORTHO RIGHT WITH FLEXION: ICD-10-PCS | Mod: 26,RT,, | Performed by: RADIOLOGY

## 2021-12-09 PROCEDURE — 73564 X-RAY EXAM KNEE 4 OR MORE: CPT | Mod: TC,RT

## 2021-12-09 PROCEDURE — 4010F ACE/ARB THERAPY RXD/TAKEN: CPT | Mod: CPTII,S$GLB,, | Performed by: PHYSICIAN ASSISTANT

## 2021-12-09 PROCEDURE — 73562 XR KNEE ORTHO RIGHT WITH FLEXION: ICD-10-PCS | Mod: 26,LT,, | Performed by: RADIOLOGY

## 2021-12-09 PROCEDURE — 4010F PR ACE/ARB THEARPY RXD/TAKEN: ICD-10-PCS | Mod: CPTII,S$GLB,, | Performed by: PHYSICIAN ASSISTANT

## 2021-12-09 RX ORDER — SIMETHICONE 125 MG
TABLET,CHEWABLE ORAL
COMMUNITY
Start: 2021-11-30 | End: 2022-06-02 | Stop reason: ALTCHOICE

## 2021-12-13 ENCOUNTER — OFFICE VISIT (OUTPATIENT)
Dept: INTERNAL MEDICINE | Facility: CLINIC | Age: 56
End: 2021-12-13
Payer: COMMERCIAL

## 2021-12-13 VITALS
HEIGHT: 61 IN | SYSTOLIC BLOOD PRESSURE: 122 MMHG | BODY MASS INDEX: 40.25 KG/M2 | RESPIRATION RATE: 19 BRPM | TEMPERATURE: 98 F | OXYGEN SATURATION: 98 % | WEIGHT: 213.19 LBS | DIASTOLIC BLOOD PRESSURE: 62 MMHG | HEART RATE: 92 BPM

## 2021-12-13 DIAGNOSIS — Z01.818 PRE-OP EVALUATION: ICD-10-CM

## 2021-12-13 DIAGNOSIS — I10 ESSENTIAL HYPERTENSION: ICD-10-CM

## 2021-12-13 PROBLEM — H60.501 ACUTE OTITIS EXTERNA OF RIGHT EAR: Status: RESOLVED | Noted: 2020-11-23 | Resolved: 2021-12-13

## 2021-12-13 PROCEDURE — 99999 PR PBB SHADOW E&M-EST. PATIENT-LVL III: ICD-10-PCS | Mod: PBBFAC,,, | Performed by: NURSE PRACTITIONER

## 2021-12-13 PROCEDURE — 4010F PR ACE/ARB THEARPY RXD/TAKEN: ICD-10-PCS | Mod: CPTII,S$GLB,, | Performed by: NURSE PRACTITIONER

## 2021-12-13 PROCEDURE — 99999 PR PBB SHADOW E&M-EST. PATIENT-LVL III: CPT | Mod: PBBFAC,,, | Performed by: NURSE PRACTITIONER

## 2021-12-13 PROCEDURE — 99213 PR OFFICE/OUTPT VISIT, EST, LEVL III, 20-29 MIN: ICD-10-PCS | Mod: S$GLB,,, | Performed by: NURSE PRACTITIONER

## 2021-12-13 PROCEDURE — 99213 OFFICE O/P EST LOW 20 MIN: CPT | Mod: S$GLB,,, | Performed by: NURSE PRACTITIONER

## 2021-12-13 PROCEDURE — 4010F ACE/ARB THERAPY RXD/TAKEN: CPT | Mod: CPTII,S$GLB,, | Performed by: NURSE PRACTITIONER

## 2021-12-14 ENCOUNTER — TELEPHONE (OUTPATIENT)
Dept: INTERNAL MEDICINE | Facility: CLINIC | Age: 56
End: 2021-12-14
Payer: COMMERCIAL

## 2021-12-29 ENCOUNTER — PATIENT MESSAGE (OUTPATIENT)
Dept: ORTHOPEDICS | Facility: CLINIC | Age: 56
End: 2021-12-29
Payer: COMMERCIAL

## 2022-01-21 ENCOUNTER — PATIENT MESSAGE (OUTPATIENT)
Dept: INTERNAL MEDICINE | Facility: CLINIC | Age: 57
End: 2022-01-21
Payer: COMMERCIAL

## 2022-04-07 ENCOUNTER — OFFICE VISIT (OUTPATIENT)
Dept: UROLOGY | Facility: CLINIC | Age: 57
End: 2022-04-07
Payer: COMMERCIAL

## 2022-04-07 ENCOUNTER — HOSPITAL ENCOUNTER (OUTPATIENT)
Dept: RADIOLOGY | Facility: HOSPITAL | Age: 57
Discharge: HOME | End: 2022-04-07
Attending: UROLOGY
Payer: COMMERCIAL

## 2022-04-07 VITALS — BODY MASS INDEX: 32.78 KG/M2 | WEIGHT: 173.5 LBS | SYSTOLIC BLOOD PRESSURE: 122 MMHG | DIASTOLIC BLOOD PRESSURE: 80 MMHG

## 2022-04-07 DIAGNOSIS — N28.1 RENAL CYST: ICD-10-CM

## 2022-04-07 DIAGNOSIS — N28.1 RENAL CYST: Primary | ICD-10-CM

## 2022-04-07 PROCEDURE — 1159F MED LIST DOCD IN RCRD: CPT | Mod: CPTII,S$GLB,, | Performed by: UROLOGY

## 2022-04-07 PROCEDURE — 3074F PR MOST RECENT SYSTOLIC BLOOD PRESSURE < 130 MM HG: ICD-10-PCS | Mod: CPTII,S$GLB,, | Performed by: UROLOGY

## 2022-04-07 PROCEDURE — 4010F PR ACE/ARB THEARPY RXD/TAKEN: ICD-10-PCS | Mod: CPTII,S$GLB,, | Performed by: UROLOGY

## 2022-04-07 PROCEDURE — 99212 PR OFFICE/OUTPT VISIT, EST, LEVL II, 10-19 MIN: ICD-10-PCS | Mod: S$GLB,,, | Performed by: UROLOGY

## 2022-04-07 PROCEDURE — 4010F ACE/ARB THERAPY RXD/TAKEN: CPT | Mod: CPTII,S$GLB,, | Performed by: UROLOGY

## 2022-04-07 PROCEDURE — 99999 PR PBB SHADOW E&M-EST. PATIENT-LVL III: CPT | Mod: PBBFAC,,, | Performed by: UROLOGY

## 2022-04-07 PROCEDURE — 3074F SYST BP LT 130 MM HG: CPT | Mod: CPTII,S$GLB,, | Performed by: UROLOGY

## 2022-04-07 PROCEDURE — 76770 US EXAM ABDO BACK WALL COMP: CPT | Mod: 26,,, | Performed by: RADIOLOGY

## 2022-04-07 PROCEDURE — 76770 US EXAM ABDO BACK WALL COMP: CPT | Mod: TC

## 2022-04-07 PROCEDURE — 3008F BODY MASS INDEX DOCD: CPT | Mod: CPTII,S$GLB,, | Performed by: UROLOGY

## 2022-04-07 PROCEDURE — 99999 PR PBB SHADOW E&M-EST. PATIENT-LVL III: ICD-10-PCS | Mod: PBBFAC,,, | Performed by: UROLOGY

## 2022-04-07 PROCEDURE — 3008F PR BODY MASS INDEX (BMI) DOCUMENTED: ICD-10-PCS | Mod: CPTII,S$GLB,, | Performed by: UROLOGY

## 2022-04-07 PROCEDURE — 1159F PR MEDICATION LIST DOCUMENTED IN MEDICAL RECORD: ICD-10-PCS | Mod: CPTII,S$GLB,, | Performed by: UROLOGY

## 2022-04-07 PROCEDURE — 3079F PR MOST RECENT DIASTOLIC BLOOD PRESSURE 80-89 MM HG: ICD-10-PCS | Mod: CPTII,S$GLB,, | Performed by: UROLOGY

## 2022-04-07 PROCEDURE — 76770 US RETROPERITONEAL COMPLETE: ICD-10-PCS | Mod: 26,,, | Performed by: RADIOLOGY

## 2022-04-07 PROCEDURE — 99212 OFFICE O/P EST SF 10 MIN: CPT | Mod: S$GLB,,, | Performed by: UROLOGY

## 2022-04-07 PROCEDURE — 3079F DIAST BP 80-89 MM HG: CPT | Mod: CPTII,S$GLB,, | Performed by: UROLOGY

## 2022-04-07 NOTE — PROGRESS NOTES
CC: follow up renal cyst    History of Present Illness:   Janette Lee is a 56 y.o. female here for evaluation of renal cyst    22- renal US personally reviewed, showing a mildly complex L renal cyst, measuring 4.8cm. No gross hematuria or flank pain. Since last visit, she had cholecystectomy, gastric sleeve and hernia repair.   10/5/21-She had an MRI with her chiropractor with a 3.1cm L renal cyst in . Report reviewed.   She underwent CT abd/pelvis in 2019, which I have personally reviewed, showing a 6.5cm Bosniak II L renal cyst.   Now, she recently had an Abdominal US at [facility] and per report, had 6.6cm minimally complex Left renal cyst, per report on patient's phone. No gross hematuria or frequency.     Review of Systems   Constitutional: Negative for chills and fever.   Gastrointestinal: Negative for abdominal pain.   Genitourinary: Negative for flank pain.   All other systems reviewed and are negative.      Past Medical History:   Diagnosis Date    Arthritis     Depression     Hypertension     PERFECTO on CPAP        Past Surgical History:   Procedure Laterality Date    arthroscopic surgery Bilateral     knees     SECTION      CHOLECYSTECTOMY      EYE SURGERY  2017    gastric sleeve      REPAIR OF COLLATERAL LIGAMENT OF THUMB         Family History   Problem Relation Age of Onset    Hypertension Mother     Diabetes Father     Hypertension Father     Heart disease Father        Social History     Tobacco Use    Smoking status: Never Smoker    Smokeless tobacco: Never Used   Substance Use Topics    Alcohol use: Yes     Comment: rarely     Drug use: No       Current Outpatient Medications   Medication Sig Dispense Refill    chlorthalidone (HYGROTEN) 25 MG Tab Take 1 tablet (25 mg total) by mouth once daily. 90 tablet 3    DULoxetine (CYMBALTA) 60 MG capsule Take 1 capsule (60 mg total) by mouth once daily. 90 capsule 0    losartan (COZAAR) 100 MG tablet  Take 1 tablet (100 mg total) by mouth once daily. 90 tablet 3    potassium chloride SA (KLOR-CON M20) 20 MEQ tablet Take 1 tablet (20 mEq total) by mouth once daily. 90 tablet 1    verapamiL (VERELAN) 360 MG C24P TAKE 1 CAPSULE DAILY 90 capsule 3    GAS RELIEF EXTRA STRENGTH 125 mg chewable tablet CHEW AND SWALLOW 1 TABLET EVERY 6 HOURS AS NEEDED FOR FLATULENCE       No current facility-administered medications for this visit.       Review of patient's allergies indicates:   Allergen Reactions    Ibuprofen        Physical Exam  Vitals:    04/07/22 1317   BP: 122/80     General: Well-developed, well-nourished, in no acute distress  HEENT: Normocephalic, atraumatic, extraocular movements intact  Neck: Supple, no supraclavicular or cervical lymphadenopathy, trachea midline  Respirations: even and unlabored  Back: midline spine, No CVA tenderness  Abdomen: soft, Non-tender, non-distended, no palpable masses, no rebound or guarding  Extremities: moves all equally, no clubbing, cyanosis or edema  Skin: Warm and dry. No lesions  Psych: normal affect  Neuro: Alert and oriented x 3. Cranial nerves II-XII intact      Urinalysis  pH, UA   Date Value Ref Range Status   10/05/2021 7  Final     Nitrite, UA   Date Value Ref Range Status   10/05/2021 -  Final       Assessment:  1. Renal cyst  POCT URINE DIPSTICK WITHOUT MICROSCOPE    US Retroperitoneal Complete (Kidney and         Plan:   Renal cyst  -     POCT URINE DIPSTICK WITHOUT MICROSCOPE  -     US Retroperitoneal Complete (Kidney and; Future; Expected date: 10/07/2022          Follow up in about 6 months (around 10/7/2022).

## 2022-05-08 ENCOUNTER — PATIENT MESSAGE (OUTPATIENT)
Dept: INTERNAL MEDICINE | Facility: CLINIC | Age: 57
End: 2022-05-08
Payer: COMMERCIAL

## 2022-05-12 ENCOUNTER — PATIENT MESSAGE (OUTPATIENT)
Dept: UROLOGY | Facility: CLINIC | Age: 57
End: 2022-05-12
Payer: COMMERCIAL

## 2022-05-12 NOTE — TELEPHONE ENCOUNTER
We need a urine sample somehow. If she has a specimen cup that she can collect in and keep overnight in the refrigerator and drop off in the am, I can call something in today that she can start after she collects the sample. Is there another lab location that stays open later?

## 2022-06-02 ENCOUNTER — OFFICE VISIT (OUTPATIENT)
Dept: INTERNAL MEDICINE | Facility: CLINIC | Age: 57
End: 2022-06-02
Payer: COMMERCIAL

## 2022-06-02 VITALS
TEMPERATURE: 98 F | DIASTOLIC BLOOD PRESSURE: 72 MMHG | SYSTOLIC BLOOD PRESSURE: 120 MMHG | RESPIRATION RATE: 16 BRPM | BODY MASS INDEX: 30.62 KG/M2 | WEIGHT: 162.06 LBS | OXYGEN SATURATION: 98 % | HEART RATE: 67 BPM

## 2022-06-02 DIAGNOSIS — E66.9 OBESITY WITH BODY MASS INDEX (BMI) OF 30.0 TO 39.9: ICD-10-CM

## 2022-06-02 DIAGNOSIS — M54.50 CHRONIC BILATERAL LOW BACK PAIN WITHOUT SCIATICA: ICD-10-CM

## 2022-06-02 DIAGNOSIS — I10 ESSENTIAL HYPERTENSION: Primary | ICD-10-CM

## 2022-06-02 DIAGNOSIS — F41.9 ANXIETY: ICD-10-CM

## 2022-06-02 DIAGNOSIS — G89.29 CHRONIC BILATERAL LOW BACK PAIN WITHOUT SCIATICA: ICD-10-CM

## 2022-06-02 PROBLEM — Z01.818 PRE-OP EVALUATION: Status: RESOLVED | Noted: 2021-10-26 | Resolved: 2022-06-02

## 2022-06-02 PROBLEM — K80.20 CALCULUS OF GALLBLADDER WITHOUT CHOLECYSTITIS WITHOUT OBSTRUCTION: Status: RESOLVED | Noted: 2021-10-26 | Resolved: 2022-06-02

## 2022-06-02 PROCEDURE — 1159F MED LIST DOCD IN RCRD: CPT | Mod: CPTII,S$GLB,, | Performed by: NURSE PRACTITIONER

## 2022-06-02 PROCEDURE — 1159F PR MEDICATION LIST DOCUMENTED IN MEDICAL RECORD: ICD-10-PCS | Mod: CPTII,S$GLB,, | Performed by: NURSE PRACTITIONER

## 2022-06-02 PROCEDURE — 3074F SYST BP LT 130 MM HG: CPT | Mod: CPTII,S$GLB,, | Performed by: NURSE PRACTITIONER

## 2022-06-02 PROCEDURE — 3008F PR BODY MASS INDEX (BMI) DOCUMENTED: ICD-10-PCS | Mod: CPTII,S$GLB,, | Performed by: NURSE PRACTITIONER

## 2022-06-02 PROCEDURE — 1160F RVW MEDS BY RX/DR IN RCRD: CPT | Mod: CPTII,S$GLB,, | Performed by: NURSE PRACTITIONER

## 2022-06-02 PROCEDURE — 99999 PR PBB SHADOW E&M-EST. PATIENT-LVL IV: CPT | Mod: PBBFAC,,, | Performed by: NURSE PRACTITIONER

## 2022-06-02 PROCEDURE — 3074F PR MOST RECENT SYSTOLIC BLOOD PRESSURE < 130 MM HG: ICD-10-PCS | Mod: CPTII,S$GLB,, | Performed by: NURSE PRACTITIONER

## 2022-06-02 PROCEDURE — 3078F DIAST BP <80 MM HG: CPT | Mod: CPTII,S$GLB,, | Performed by: NURSE PRACTITIONER

## 2022-06-02 PROCEDURE — 4010F ACE/ARB THERAPY RXD/TAKEN: CPT | Mod: CPTII,S$GLB,, | Performed by: NURSE PRACTITIONER

## 2022-06-02 PROCEDURE — 99999 PR PBB SHADOW E&M-EST. PATIENT-LVL IV: ICD-10-PCS | Mod: PBBFAC,,, | Performed by: NURSE PRACTITIONER

## 2022-06-02 PROCEDURE — 99214 OFFICE O/P EST MOD 30 MIN: CPT | Mod: S$GLB,,, | Performed by: NURSE PRACTITIONER

## 2022-06-02 PROCEDURE — 4010F PR ACE/ARB THEARPY RXD/TAKEN: ICD-10-PCS | Mod: CPTII,S$GLB,, | Performed by: NURSE PRACTITIONER

## 2022-06-02 PROCEDURE — 3078F PR MOST RECENT DIASTOLIC BLOOD PRESSURE < 80 MM HG: ICD-10-PCS | Mod: CPTII,S$GLB,, | Performed by: NURSE PRACTITIONER

## 2022-06-02 PROCEDURE — 99214 PR OFFICE/OUTPT VISIT, EST, LEVL IV, 30-39 MIN: ICD-10-PCS | Mod: S$GLB,,, | Performed by: NURSE PRACTITIONER

## 2022-06-02 PROCEDURE — 1160F PR REVIEW ALL MEDS BY PRESCRIBER/CLIN PHARMACIST DOCUMENTED: ICD-10-PCS | Mod: CPTII,S$GLB,, | Performed by: NURSE PRACTITIONER

## 2022-06-02 PROCEDURE — 3008F BODY MASS INDEX DOCD: CPT | Mod: CPTII,S$GLB,, | Performed by: NURSE PRACTITIONER

## 2022-06-02 RX ORDER — CHLORTHALIDONE 25 MG/1
25 TABLET ORAL DAILY
Qty: 90 TABLET | Refills: 3 | Status: SHIPPED | OUTPATIENT
Start: 2022-06-02 | End: 2023-06-05 | Stop reason: SDUPTHER

## 2022-06-02 RX ORDER — LOSARTAN POTASSIUM 100 MG/1
50 TABLET ORAL DAILY
Qty: 90 TABLET | Refills: 3
Start: 2022-06-02 | End: 2022-10-20 | Stop reason: SDUPTHER

## 2022-06-02 RX ORDER — VERAPAMIL HYDROCHLORIDE 360 MG/1
360 CAPSULE, DELAYED RELEASE PELLETS ORAL DAILY
Qty: 90 CAPSULE | Refills: 3 | Status: SHIPPED | OUTPATIENT
Start: 2022-06-02 | End: 2023-06-05 | Stop reason: SDUPTHER

## 2022-06-02 RX ORDER — DULOXETIN HYDROCHLORIDE 60 MG/1
60 CAPSULE, DELAYED RELEASE ORAL DAILY
Qty: 90 CAPSULE | Refills: 3 | Status: SHIPPED | OUTPATIENT
Start: 2022-06-02 | End: 2023-06-05 | Stop reason: SDUPTHER

## 2022-06-02 NOTE — PROGRESS NOTES
Subjective:       Patient ID: Janette Lee is a 56 y.o. female.    Chief Complaint: Medication Refill and mobility tag    Mrs. Lee presents to visit for medication refill as well as DMV in the handicap tag form completion.  Overall has been doing well.  She has had a 60 lb weight loss since her bariatric surgery in December.  She does continue to have some chronic lower back pain as well as bilateral knee pain which is the reasoning why she is requesting a handicap tag.  Has follow-up with surgeon on  for labs.  Also would like to discuss possibly decreasing blood pressure medications to see if she can tolerate since she has had significant weight loss.    Has upcoming appt with GYN for mammogram and well woman on .       Patient Active Problem List   Diagnosis    Nonintractable headache    Essential hypertension    Adenoma of left adrenal gland    Varicose veins of both legs with edema    Onychomycosis    PERFECTO on CPAP    Anxiety    Hypercholesteremia    Bilateral leg cramps    Obesity with body mass index (BMI) of 30.0 to 39.9    Chronic bilateral low back pain without sciatica       Family History   Problem Relation Age of Onset    Hypertension Mother     Diabetes Father     Hypertension Father     Heart disease Father      Past Surgical History:   Procedure Laterality Date    arthroscopic surgery Bilateral     knees     SECTION      CHOLECYSTECTOMY      EYE SURGERY  2017    gastric sleeve      REPAIR OF COLLATERAL LIGAMENT OF THUMB           Current Outpatient Medications:     potassium chloride SA (KLOR-CON M20) 20 MEQ tablet, Take 1 tablet (20 mEq total) by mouth once daily., Disp: 90 tablet, Rfl: 1    chlorthalidone (HYGROTEN) 25 MG Tab, Take 1 tablet (25 mg total) by mouth once daily., Disp: 90 tablet, Rfl: 3    DULoxetine (CYMBALTA) 60 MG capsule, Take 1 capsule (60 mg total) by mouth once daily., Disp: 90 capsule, Rfl: 3    losartan (COZAAR) 100 MG  tablet, Take 0.5 tablets (50 mg total) by mouth once daily., Disp: 90 tablet, Rfl: 3    verapamiL (VERELAN) 360 MG C24P, Take 1 capsule (360 mg total) by mouth once daily., Disp: 90 capsule, Rfl: 3    Review of Systems   Constitutional: Negative for activity change, appetite change, chills, fatigue and fever.   Respiratory: Negative for cough and shortness of breath.    Cardiovascular: Negative for chest pain and palpitations.   Gastrointestinal: Negative for abdominal pain, diarrhea, nausea and vomiting.   Musculoskeletal: Positive for arthralgias and back pain (chronic). Negative for neck pain.   Neurological: Negative for headaches.       Objective:   /72   Pulse 67   Temp 98.1 °F (36.7 °C)   Resp 16   Wt 73.5 kg (162 lb 0.6 oz)   SpO2 98%   BMI 30.62 kg/m²      Physical Exam  Vitals reviewed.   Constitutional:       General: She is not in acute distress.     Appearance: Normal appearance. She is well-developed. She is not ill-appearing or diaphoretic.   HENT:      Head: Normocephalic.   Cardiovascular:      Rate and Rhythm: Normal rate and regular rhythm.      Pulses: Normal pulses.      Heart sounds: Normal heart sounds. No murmur heard.    No friction rub. No gallop.   Pulmonary:      Effort: Pulmonary effort is normal. No respiratory distress.      Breath sounds: Normal breath sounds. No rales.   Musculoskeletal:         General: Normal range of motion.      Cervical back: Normal range of motion and neck supple.   Skin:     General: Skin is warm and dry.      Coloration: Skin is not pale.      Findings: No erythema.   Neurological:      Mental Status: She is alert and oriented to person, place, and time.   Psychiatric:         Mood and Affect: Mood normal.         Behavior: Behavior normal.         Assessment & Plan     Problem List Items Addressed This Visit        Psychiatric    Anxiety    Relevant Medications    DULoxetine (CYMBALTA) 60 MG capsule       Cardiac/Vascular    Essential  "hypertension - Primary    Current Assessment & Plan     Blood pressure at goal today.  Discussed possibly trying to decrease losartan to 50 mg, or chlorthalidone to every other day regimen.  Follow-up in 2 weeks to assess tolerability of decreasing losartan.           Relevant Medications    losartan (COZAAR) 100 MG tablet    chlorthalidone (HYGROTEN) 25 MG Tab    verapamiL (VERELAN) 360 MG C24P       Endocrine    Obesity with body mass index (BMI) of 30.0 to 39.9    Current Assessment & Plan     Applauded on significant weight loss.  Keep up the good work              Orthopedic    Chronic bilateral low back pain without sciatica    Current Assessment & Plan     Has noticed some improvement since bariatric surgery.  Handicap tag completed.               Follow up in about 2 weeks (around 6/16/2022) for hypertension nurse visit..            Portions of this note may have been created with voice recognition software. Occasional "wrong-word" or "sound-a-like" substitutions may have occurred due to the inherent limitations of voice recognition software. Please, read the note carefully and recognize, using context, where substitutions have occurred.       "

## 2022-06-02 NOTE — ASSESSMENT & PLAN NOTE
Blood pressure at goal today.  Discussed possibly trying to decrease losartan to 50 mg, or chlorthalidone to every other day regimen.  Follow-up in 2 weeks to assess tolerability of decreasing losartan.

## 2022-06-16 LAB — BCS RECOMMENDATION EXT: NORMAL

## 2022-07-25 ENCOUNTER — PATIENT MESSAGE (OUTPATIENT)
Dept: INTERNAL MEDICINE | Facility: CLINIC | Age: 57
End: 2022-07-25
Payer: COMMERCIAL

## 2022-07-30 ENCOUNTER — PATIENT MESSAGE (OUTPATIENT)
Dept: INTERNAL MEDICINE | Facility: CLINIC | Age: 57
End: 2022-07-30
Payer: COMMERCIAL

## 2022-08-03 DIAGNOSIS — Z12.31 OTHER SCREENING MAMMOGRAM: ICD-10-CM

## 2022-08-09 ENCOUNTER — PATIENT MESSAGE (OUTPATIENT)
Dept: ADMINISTRATIVE | Facility: HOSPITAL | Age: 57
End: 2022-08-09
Payer: COMMERCIAL

## 2022-08-09 NOTE — LETTER
AUTHORIZATION FOR RELEASE OF   CONFIDENTIAL INFORMATION        We are seeing Janette Lee, date of birth 1965, in the clinic at [facility]. Maryanne Beltran NP is the patient's PCP. Janette Lee has an outstanding lab/procedure at the time we reviewed her chart. In order to help keep her health information updated, she has authorized us to request the following medical record(s):        (X)  MAMMOGRAM                                      (  )  COLONOSCOPY      (  )  PAP SMEAR                                          (  )  OUTSIDE LAB RESULTS     (  )  DEXA SCAN                                          (  )  EYE EXAM            (  )  FOOT EXAM                                          (  )  ENTIRE RECORD     (  )  OUTSIDE IMMUNIZATIONS                 (  )  _______________         Please fax records to [facility], Rachelle St Angelo, NP, [phone]    [facility]  Care Coordination Department  [facility]  [phone]      Patient Name: Janette Lee  : 1965  Patient Phone #: [phone]

## 2022-09-03 ENCOUNTER — PATIENT MESSAGE (OUTPATIENT)
Dept: UROLOGY | Facility: CLINIC | Age: 57
End: 2022-09-03
Payer: COMMERCIAL

## 2022-09-28 DIAGNOSIS — I10 ESSENTIAL HYPERTENSION: ICD-10-CM

## 2022-10-14 ENCOUNTER — HOSPITAL ENCOUNTER (OUTPATIENT)
Dept: RADIOLOGY | Facility: HOSPITAL | Age: 57
Discharge: HOME | End: 2022-10-14
Attending: UROLOGY
Payer: COMMERCIAL

## 2022-10-14 ENCOUNTER — OFFICE VISIT (OUTPATIENT)
Dept: UROLOGY | Facility: CLINIC | Age: 57
End: 2022-10-14
Payer: COMMERCIAL

## 2022-10-14 VITALS
BODY MASS INDEX: 27.06 KG/M2 | DIASTOLIC BLOOD PRESSURE: 82 MMHG | SYSTOLIC BLOOD PRESSURE: 134 MMHG | WEIGHT: 143.31 LBS | HEIGHT: 61 IN

## 2022-10-14 DIAGNOSIS — N28.1 CYST OF KIDNEY, ACQUIRED: Primary | ICD-10-CM

## 2022-10-14 DIAGNOSIS — N39.0 RECURRENT UTI: ICD-10-CM

## 2022-10-14 DIAGNOSIS — N28.1 RENAL CYST: ICD-10-CM

## 2022-10-14 PROCEDURE — 99214 OFFICE O/P EST MOD 30 MIN: CPT | Mod: S$GLB,,, | Performed by: UROLOGY

## 2022-10-14 PROCEDURE — 4010F PR ACE/ARB THEARPY RXD/TAKEN: ICD-10-PCS | Mod: CPTII,S$GLB,, | Performed by: UROLOGY

## 2022-10-14 PROCEDURE — 99999 PR PBB SHADOW E&M-EST. PATIENT-LVL III: CPT | Mod: PBBFAC,,, | Performed by: UROLOGY

## 2022-10-14 PROCEDURE — 99214 PR OFFICE/OUTPT VISIT, EST, LEVL IV, 30-39 MIN: ICD-10-PCS | Mod: S$GLB,,, | Performed by: UROLOGY

## 2022-10-14 PROCEDURE — 3075F SYST BP GE 130 - 139MM HG: CPT | Mod: CPTII,S$GLB,, | Performed by: UROLOGY

## 2022-10-14 PROCEDURE — 4010F ACE/ARB THERAPY RXD/TAKEN: CPT | Mod: CPTII,S$GLB,, | Performed by: UROLOGY

## 2022-10-14 PROCEDURE — 99999 PR PBB SHADOW E&M-EST. PATIENT-LVL III: ICD-10-PCS | Mod: PBBFAC,,, | Performed by: UROLOGY

## 2022-10-14 PROCEDURE — 3075F PR MOST RECENT SYSTOLIC BLOOD PRESS GE 130-139MM HG: ICD-10-PCS | Mod: CPTII,S$GLB,, | Performed by: UROLOGY

## 2022-10-14 PROCEDURE — 3079F PR MOST RECENT DIASTOLIC BLOOD PRESSURE 80-89 MM HG: ICD-10-PCS | Mod: CPTII,S$GLB,, | Performed by: UROLOGY

## 2022-10-14 PROCEDURE — 76770 US EXAM ABDO BACK WALL COMP: CPT | Mod: TC,PN

## 2022-10-14 PROCEDURE — 3079F DIAST BP 80-89 MM HG: CPT | Mod: CPTII,S$GLB,, | Performed by: UROLOGY

## 2022-10-14 NOTE — PROGRESS NOTES
CC: follow up renal cyst    History of Present Illness:   Janette Lee is a 56 y.o. female here for evaluation of renal cyst    10/12/22- renal US from today measures cyst at 5.5cm, with septation. I have personally reviewed these images. No flank pain, no gross hematuria. Has had 3 UTIs over the past 6 months. Treated by PCP. No cultures.   22- renal US personally reviewed, showing a mildly complex L renal cyst, measuring 4.8cm. No gross hematuria or flank pain. Since last visit, she had cholecystectomy, gastric sleeve and hernia repair.   10/5/21-She had an MRI with her chiropractor with a 3.1cm L renal cyst in . Report reviewed.   She underwent CT abd/pelvis in 2019, which I have personally reviewed, showing a 6.5cm Bosniak II L renal cyst.   Now, she recently had an Abdominal US at [facility] and per report, had 6.6cm minimally complex Left renal cyst, per report on patient's phone. No gross hematuria or frequency.     Review of Systems   Constitutional:  Negative for chills and fever.   Gastrointestinal:  Negative for abdominal pain.   Genitourinary:  Negative for flank pain.   All other systems reviewed and are negative.    Past Medical History:   Diagnosis Date    Arthritis     Depression     Hypertension     PERFECTO on CPAP        Past Surgical History:   Procedure Laterality Date    arthroscopic surgery Bilateral     knees     SECTION      CHOLECYSTECTOMY      EYE SURGERY  2017    gastric sleeve      REPAIR OF COLLATERAL LIGAMENT OF THUMB         Family History   Problem Relation Age of Onset    Hypertension Mother     Diabetes Father     Hypertension Father     Heart disease Father        Social History     Tobacco Use    Smoking status: Never    Smokeless tobacco: Never   Substance Use Topics    Alcohol use: Yes     Comment: rarely     Drug use: No       Current Outpatient Medications   Medication Sig Dispense Refill    chlorthalidone (HYGROTEN) 25 MG Tab Take 1 tablet  (25 mg total) by mouth once daily. 90 tablet 3    DULoxetine (CYMBALTA) 60 MG capsule Take 1 capsule (60 mg total) by mouth once daily. 90 capsule 3    verapamiL (VERELAN) 360 MG C24P Take 1 capsule (360 mg total) by mouth once daily. 90 capsule 3    butalbital-acetaminophen-caffeine -40 mg (FIORICET, ESGIC) -40 mg per tablet Take 1 tablet by mouth every 4 (four) hours as needed for Headaches. 30 tablet 0    losartan (COZAAR) 100 MG tablet Take 0.5 tablets (50 mg total) by mouth once daily. 90 tablet 3    ondansetron (ZOFRAN) 4 MG tablet Take 1 tablet (4 mg total) by mouth every 6 (six) hours as needed for Nausea. 20 tablet 0    potassium chloride SA (KLOR-CON M20) 20 MEQ tablet Take 1 tablet (20 mEq total) by mouth once daily. 90 tablet 3     No current facility-administered medications for this visit.       Review of patient's allergies indicates:   Allergen Reactions    Ibuprofen        Physical Exam  Vitals:    10/14/22 0944   BP: 134/82     General: Well-developed, well-nourished, in no acute distress  HEENT: Normocephalic, atraumatic, extraocular movements intact  Neck: Supple, no supraclavicular or cervical lymphadenopathy, trachea midline  Respirations: even and unlabored  Back: midline spine, No CVA tenderness  Abdomen: soft, Non-tender, non-distended, no palpable masses, no rebound or guarding  Extremities: moves all equally, no clubbing, cyanosis or edema  Skin: Warm and dry. No lesions  Psych: normal affect  Neuro: Alert and oriented x 3. Cranial nerves II-XII intact      Urinalysis  Trace protein, negative for blood, LE, nit      Assessment:  1. Cyst of kidney, acquired  CT Abdomen With Without Contrast    BUN    Creatinine, Serum      2. Recurrent UTI                Plan:   Cyst of kidney, acquired  -     CT Abdomen With Without Contrast; Future; Expected date: 10/14/2022  -     BUN; Future; Expected date: 10/14/2022  -     Creatinine, Serum; Future; Expected date: 10/14/2022    Recurrent  UTI      Discussed vag est if UTIs persist  Discussed urine drop off protocol and cultures.     Follow up in about 6 months (around 4/14/2023) for CT results.

## 2022-10-16 ENCOUNTER — HOSPITAL ENCOUNTER (EMERGENCY)
Facility: HOSPITAL | Age: 57
Discharge: HOME | End: 2022-10-16
Attending: EMERGENCY MEDICINE
Payer: COMMERCIAL

## 2022-10-16 VITALS
HEART RATE: 68 BPM | HEIGHT: 61 IN | DIASTOLIC BLOOD PRESSURE: 73 MMHG | SYSTOLIC BLOOD PRESSURE: 155 MMHG | OXYGEN SATURATION: 100 % | TEMPERATURE: 98 F | WEIGHT: 143.31 LBS | BODY MASS INDEX: 27.06 KG/M2 | RESPIRATION RATE: 19 BRPM

## 2022-10-16 DIAGNOSIS — R51.9 HEADACHE: ICD-10-CM

## 2022-10-16 DIAGNOSIS — R11.2 NAUSEA AND VOMITING, UNSPECIFIED VOMITING TYPE: ICD-10-CM

## 2022-10-16 DIAGNOSIS — R51.9 NONINTRACTABLE HEADACHE, UNSPECIFIED CHRONICITY PATTERN, UNSPECIFIED HEADACHE TYPE: Primary | ICD-10-CM

## 2022-10-16 LAB
ALBUMIN SERPL BCP-MCNC: 4.5 G/DL (ref 3.5–5.2)
ALP SERPL-CCNC: 87 U/L (ref 55–135)
ALT SERPL W/O P-5'-P-CCNC: 34 U/L (ref 10–44)
ANION GAP SERPL CALC-SCNC: 22 MMOL/L (ref 8–16)
ANION GAP SERPL CALC-SCNC: 9 MMOL/L (ref 8–16)
APTT BLDCRRT: 26.5 SEC (ref 21–32)
AST SERPL-CCNC: 16 U/L (ref 10–40)
BASOPHILS # BLD AUTO: 0.04 K/UL (ref 0–0.2)
BASOPHILS NFR BLD: 0.4 % (ref 0–1.9)
BILIRUB SERPL-MCNC: 1 MG/DL (ref 0.1–1)
BILIRUB UR QL STRIP: NEGATIVE
BNP SERPL-MCNC: 24 PG/ML (ref 0–99)
BUN SERPL-MCNC: 14 MG/DL (ref 6–20)
BUN SERPL-MCNC: 20 MG/DL (ref 6–20)
CALCIUM SERPL-MCNC: 10 MG/DL (ref 8.7–10.5)
CALCIUM SERPL-MCNC: 8.9 MG/DL (ref 8.7–10.5)
CHLORIDE SERPL-SCNC: 105 MMOL/L (ref 95–110)
CHLORIDE SERPL-SCNC: 99 MMOL/L (ref 95–110)
CLARITY UR REFRACT.AUTO: CLEAR
CO2 SERPL-SCNC: 19 MMOL/L (ref 23–29)
CO2 SERPL-SCNC: 27 MMOL/L (ref 23–29)
COLOR UR AUTO: NORMAL
CREAT SERPL-MCNC: 0.6 MG/DL (ref 0.5–1.4)
CREAT SERPL-MCNC: 0.8 MG/DL (ref 0.5–1.4)
CTP QC/QA: YES
CTP QC/QA: YES
DIFFERENTIAL METHOD: ABNORMAL
EOSINOPHIL # BLD AUTO: 0 K/UL (ref 0–0.5)
EOSINOPHIL NFR BLD: 0.4 % (ref 0–8)
ERYTHROCYTE [DISTWIDTH] IN BLOOD BY AUTOMATED COUNT: 12.3 % (ref 11.5–14.5)
EST. GFR  (NO RACE VARIABLE): >60 ML/MIN/1.73 M^2
EST. GFR  (NO RACE VARIABLE): >60 ML/MIN/1.73 M^2
GLUCOSE SERPL-MCNC: 117 MG/DL (ref 70–110)
GLUCOSE SERPL-MCNC: 98 MG/DL (ref 70–110)
GLUCOSE UR QL STRIP: NEGATIVE
HCT VFR BLD AUTO: 42.5 % (ref 37–48.5)
HGB BLD-MCNC: 14.6 G/DL (ref 12–16)
HGB UR QL STRIP: NEGATIVE
IMM GRANULOCYTES # BLD AUTO: 0.03 K/UL (ref 0–0.04)
IMM GRANULOCYTES NFR BLD AUTO: 0.3 % (ref 0–0.5)
INR PPP: 1 (ref 0.8–1.2)
KETONES UR QL STRIP: NEGATIVE
LEUKOCYTE ESTERASE UR QL STRIP: NEGATIVE
LIPASE SERPL-CCNC: 31 U/L (ref 4–60)
LYMPHOCYTES # BLD AUTO: 4.6 K/UL (ref 1–4.8)
LYMPHOCYTES NFR BLD: 43.8 % (ref 18–48)
MAGNESIUM SERPL-MCNC: 1.7 MG/DL (ref 1.6–2.6)
MCH RBC QN AUTO: 31.8 PG (ref 27–31)
MCHC RBC AUTO-ENTMCNC: 34.4 G/DL (ref 32–36)
MCV RBC AUTO: 93 FL (ref 82–98)
MONOCYTES # BLD AUTO: 1 K/UL (ref 0.3–1)
MONOCYTES NFR BLD: 9.1 % (ref 4–15)
NEUTROPHILS # BLD AUTO: 4.9 K/UL (ref 1.8–7.7)
NEUTROPHILS NFR BLD: 46 % (ref 38–73)
NITRITE UR QL STRIP: NEGATIVE
NRBC BLD-RTO: 0 /100 WBC
PH UR STRIP: 7 [PH] (ref 5–8)
PLATELET # BLD AUTO: 361 K/UL (ref 150–450)
PMV BLD AUTO: 10.2 FL (ref 9.2–12.9)
POC MOLECULAR INFLUENZA A AGN: NEGATIVE
POC MOLECULAR INFLUENZA B AGN: NEGATIVE
POTASSIUM SERPL-SCNC: 2.7 MMOL/L (ref 3.5–5.1)
POTASSIUM SERPL-SCNC: 4.6 MMOL/L (ref 3.5–5.1)
PROT SERPL-MCNC: 8.8 G/DL (ref 6–8.4)
PROT UR QL STRIP: NEGATIVE
PROTHROMBIN TIME: 10.3 SEC (ref 9–12.5)
RBC # BLD AUTO: 4.59 M/UL (ref 4–5.4)
SARS-COV-2 RDRP RESP QL NAA+PROBE: NEGATIVE
SODIUM SERPL-SCNC: 140 MMOL/L (ref 136–145)
SODIUM SERPL-SCNC: 141 MMOL/L (ref 136–145)
SP GR UR STRIP: 1 (ref 1–1.03)
TROPONIN I SERPL DL<=0.01 NG/ML-MCNC: <0.006 NG/ML (ref 0–0.03)
URN SPEC COLLECT METH UR: NORMAL
UROBILINOGEN UR STRIP-ACNC: NEGATIVE EU/DL
WBC # BLD AUTO: 10.6 K/UL (ref 3.9–12.7)

## 2022-10-16 PROCEDURE — 96367 TX/PROPH/DG ADDL SEQ IV INF: CPT | Mod: ER

## 2022-10-16 PROCEDURE — 25000003 PHARM REV CODE 250: Mod: ER | Performed by: EMERGENCY MEDICINE

## 2022-10-16 PROCEDURE — 85025 COMPLETE CBC W/AUTO DIFF WBC: CPT | Mod: ER | Performed by: EMERGENCY MEDICINE

## 2022-10-16 PROCEDURE — 25500020 PHARM REV CODE 255: Mod: ER | Performed by: EMERGENCY MEDICINE

## 2022-10-16 PROCEDURE — 99900035 HC TECH TIME PER 15 MIN (STAT): Mod: ER

## 2022-10-16 PROCEDURE — 96375 TX/PRO/DX INJ NEW DRUG ADDON: CPT | Mod: 59,ER

## 2022-10-16 PROCEDURE — 83735 ASSAY OF MAGNESIUM: CPT | Mod: ER | Performed by: EMERGENCY MEDICINE

## 2022-10-16 PROCEDURE — 96361 HYDRATE IV INFUSION ADD-ON: CPT | Mod: ER

## 2022-10-16 PROCEDURE — 93005 ELECTROCARDIOGRAM TRACING: CPT | Mod: ER

## 2022-10-16 PROCEDURE — 80053 COMPREHEN METABOLIC PANEL: CPT | Mod: ER | Performed by: EMERGENCY MEDICINE

## 2022-10-16 PROCEDURE — 83880 ASSAY OF NATRIURETIC PEPTIDE: CPT | Mod: ER | Performed by: EMERGENCY MEDICINE

## 2022-10-16 PROCEDURE — 94761 N-INVAS EAR/PLS OXIMETRY MLT: CPT | Mod: ER

## 2022-10-16 PROCEDURE — 93010 EKG 12-LEAD: ICD-10-PCS | Mod: ,,, | Performed by: INTERNAL MEDICINE

## 2022-10-16 PROCEDURE — 80048 BASIC METABOLIC PNL TOTAL CA: CPT | Mod: ER | Performed by: EMERGENCY MEDICINE

## 2022-10-16 PROCEDURE — 86803 HEPATITIS C AB TEST: CPT | Performed by: EMERGENCY MEDICINE

## 2022-10-16 PROCEDURE — 87389 HIV-1 AG W/HIV-1&-2 AB AG IA: CPT | Performed by: EMERGENCY MEDICINE

## 2022-10-16 PROCEDURE — 99285 EMERGENCY DEPT VISIT HI MDM: CPT | Mod: 25,ER

## 2022-10-16 PROCEDURE — 96365 THER/PROPH/DIAG IV INF INIT: CPT | Mod: ER

## 2022-10-16 PROCEDURE — 93010 ELECTROCARDIOGRAM REPORT: CPT | Mod: ,,, | Performed by: INTERNAL MEDICINE

## 2022-10-16 PROCEDURE — 83690 ASSAY OF LIPASE: CPT | Mod: ER | Performed by: EMERGENCY MEDICINE

## 2022-10-16 PROCEDURE — 96366 THER/PROPH/DIAG IV INF ADDON: CPT | Mod: ER

## 2022-10-16 PROCEDURE — 84484 ASSAY OF TROPONIN QUANT: CPT | Mod: ER | Performed by: EMERGENCY MEDICINE

## 2022-10-16 PROCEDURE — 85610 PROTHROMBIN TIME: CPT | Mod: ER | Performed by: EMERGENCY MEDICINE

## 2022-10-16 PROCEDURE — 27000221 HC OXYGEN, UP TO 24 HOURS: Mod: ER

## 2022-10-16 PROCEDURE — 63600175 PHARM REV CODE 636 W HCPCS: Mod: ER | Performed by: EMERGENCY MEDICINE

## 2022-10-16 PROCEDURE — 81003 URINALYSIS AUTO W/O SCOPE: CPT | Mod: ER | Performed by: EMERGENCY MEDICINE

## 2022-10-16 PROCEDURE — 85730 THROMBOPLASTIN TIME PARTIAL: CPT | Mod: ER | Performed by: EMERGENCY MEDICINE

## 2022-10-16 PROCEDURE — 87635 SARS-COV-2 COVID-19 AMP PRB: CPT | Mod: ER | Performed by: EMERGENCY MEDICINE

## 2022-10-16 RX ORDER — POTASSIUM CHLORIDE 7.45 MG/ML
10 INJECTION INTRAVENOUS
Status: DISPENSED | OUTPATIENT
Start: 2022-10-16 | End: 2022-10-16

## 2022-10-16 RX ORDER — SODIUM CHLORIDE 9 MG/ML
1000 INJECTION, SOLUTION INTRAVENOUS
Status: COMPLETED | OUTPATIENT
Start: 2022-10-16 | End: 2022-10-16

## 2022-10-16 RX ORDER — MAGNESIUM SULFATE 1 G/100ML
1 INJECTION INTRAVENOUS
Status: COMPLETED | OUTPATIENT
Start: 2022-10-16 | End: 2022-10-16

## 2022-10-16 RX ORDER — DIPHENHYDRAMINE HYDROCHLORIDE 50 MG/ML
12.5 INJECTION INTRAMUSCULAR; INTRAVENOUS
Status: COMPLETED | OUTPATIENT
Start: 2022-10-16 | End: 2022-10-16

## 2022-10-16 RX ORDER — METOCLOPRAMIDE HYDROCHLORIDE 5 MG/ML
10 INJECTION INTRAMUSCULAR; INTRAVENOUS
Status: COMPLETED | OUTPATIENT
Start: 2022-10-16 | End: 2022-10-16

## 2022-10-16 RX ORDER — ONDANSETRON 4 MG/1
4 TABLET, ORALLY DISINTEGRATING ORAL EVERY 8 HOURS PRN
Qty: 11 TABLET | Refills: 0 | Status: SHIPPED | OUTPATIENT
Start: 2022-10-16 | End: 2022-10-19

## 2022-10-16 RX ADMIN — MAGNESIUM SULFATE HEPTAHYDRATE 1 G: 1 INJECTION, SOLUTION INTRAVENOUS at 11:10

## 2022-10-16 RX ADMIN — POTASSIUM CHLORIDE 10 MEQ: 7.46 INJECTION, SOLUTION INTRAVENOUS at 01:10

## 2022-10-16 RX ADMIN — POTASSIUM CHLORIDE 10 MEQ: 7.46 INJECTION, SOLUTION INTRAVENOUS at 02:10

## 2022-10-16 RX ADMIN — DIPHENHYDRAMINE HYDROCHLORIDE 12.5 MG: 50 INJECTION INTRAMUSCULAR; INTRAVENOUS at 09:10

## 2022-10-16 RX ADMIN — POTASSIUM BICARBONATE 40 MEQ: 391 TABLET, EFFERVESCENT ORAL at 11:10

## 2022-10-16 RX ADMIN — POTASSIUM CHLORIDE 10 MEQ: 7.46 INJECTION, SOLUTION INTRAVENOUS at 03:10

## 2022-10-16 RX ADMIN — SODIUM CHLORIDE 1000 ML: 0.9 INJECTION, SOLUTION INTRAVENOUS at 09:10

## 2022-10-16 RX ADMIN — METOCLOPRAMIDE 10 MG: 5 INJECTION, SOLUTION INTRAMUSCULAR; INTRAVENOUS at 09:10

## 2022-10-16 RX ADMIN — IOHEXOL 75 ML: 350 INJECTION, SOLUTION INTRAVENOUS at 12:10

## 2022-10-16 NOTE — ED PROVIDER NOTES
"Encounter Date: 10/16/2022       History     Chief Complaint   Patient presents with    Headache     Emesis this am, post emesis posterior headache patient describes as "pounding". Alert and oriented in triage. Hyperventilating      56-year-old female with past medical history hypertension, depression, PERFECTO presents to the emergency department with complaints of a headache.  This started suddenly after breakfast today.  The headache is occipital bilaterally.  It is moderate to severe in severity.  The headache reached its maximum intensity within 5 seconds of onset.  Patient was eating breakfast and began to feel warm hot nauseated, and had an episode of vomiting.  While vomiting she had the sudden onset of this occipital headache.  Patient denies any numbness, weakness, fevers, chills, vision changes, bowel or bladder dysfunction, cough, congestion.      Review of patient's allergies indicates:   Allergen Reactions    Ibuprofen      Past Medical History:   Diagnosis Date    Arthritis     Depression     Hypertension     PERFECTO on CPAP      Past Surgical History:   Procedure Laterality Date    arthroscopic surgery Bilateral     knees     SECTION      CHOLECYSTECTOMY      EYE SURGERY  2017    gastric sleeve      REPAIR OF COLLATERAL LIGAMENT OF THUMB       Family History   Problem Relation Age of Onset    Hypertension Mother     Diabetes Father     Hypertension Father     Heart disease Father      Social History     Tobacco Use    Smoking status: Never    Smokeless tobacco: Never   Substance Use Topics    Alcohol use: Yes     Comment: rarely     Drug use: No     Review of Systems   Constitutional:  Negative for diaphoresis and fever.   HENT:  Negative for congestion, dental problem and sore throat.    Eyes:  Negative for pain and visual disturbance.   Respiratory:  Negative for cough and shortness of breath.    Cardiovascular:  Negative for chest pain and palpitations.   Gastrointestinal:  Positive for " nausea and vomiting. Negative for abdominal pain and diarrhea.   Genitourinary:  Negative for dysuria and flank pain.   Musculoskeletal:  Negative for back pain and neck pain.   Skin:  Negative for rash and wound.   Neurological:  Positive for headaches. Negative for weakness and numbness.   Psychiatric/Behavioral:  Negative for agitation and confusion.      Physical Exam     Initial Vitals   BP Pulse Resp Temp SpO2   10/16/22 0916 10/16/22 0916 10/16/22 0916 10/16/22 0949 10/16/22 0916   (!) 190/79 84 (!) 28 97.7 °F (36.5 °C) 100 %      MAP       --                Physical Exam    Constitutional: She appears distressed.   Patient crying in bed, appears in moderate pain.   HENT:   Head: Normocephalic and atraumatic.   Mouth/Throat: Oropharynx is clear and moist.   Eyes: EOM are normal. Pupils are equal, round, and reactive to light.   Neck: Neck supple.   Normal range of motion.  Cardiovascular:  Normal rate and regular rhythm.           Pulmonary/Chest: Breath sounds normal. No respiratory distress.   Abdominal: She exhibits no distension. There is no abdominal tenderness.   Musculoskeletal:         General: No tenderness or edema. Normal range of motion.      Cervical back: Normal range of motion and neck supple.     Neurological: She is alert and oriented to person, place, and time. She has normal strength. No sensory deficit. GCS score is 15. GCS eye subscore is 4. GCS verbal subscore is 5. GCS motor subscore is 6.   Skin: Skin is warm.   Psychiatric: She has a normal mood and affect.       ED Course   Procedures  Labs Reviewed   CBC W/ AUTO DIFFERENTIAL - Abnormal; Notable for the following components:       Result Value    MCH 31.8 (*)     All other components within normal limits   COMPREHENSIVE METABOLIC PANEL - Abnormal; Notable for the following components:    Potassium 2.7 (*)     CO2 19 (*)     Glucose 117 (*)     Total Protein 8.8 (*)     Anion Gap 22 (*)     All other components within normal limits     Narrative:        Potassium critical result(s) called and verbal readback obtained   from Marli Jean RN.   by YENNI 10/16/2022 11:03   SARS-COV-2 RDRP GENE - Normal   POCT INFLUENZA A/B MOLECULAR - Normal   APTT   PROTIME-INR   LIPASE   TROPONIN I   B-TYPE NATRIURETIC PEPTIDE   URINALYSIS, REFLEX TO URINE CULTURE    Narrative:     Specimen Source->Urine   MAGNESIUM   MAGNESIUM   BASIC METABOLIC PANEL   HIV 1 / 2 ANTIBODY   HEPATITIS C ANTIBODY   HEP C VIRUS HOLD SPECIMEN     EKG Readings: (Independently Interpreted)   Rate of 79 beats per minute.  Normal sinus rhythm.  Normal axis.  P.r., QRS and QTC are are within normal limits.  There are nonspecific ST changes in 1, 2, AVF, V2, V3, V4, V5 and V6.     Imaging Results              CTA Brain (Final result)  Result time 10/16/22 12:24:31      Final result by Francisco Javier Flowers MD (10/16/22 12:24:31)                   Impression:      No acute abnormality. No significant intracranial stenosis, occlusion, aneurysm, or vascular malformation.      Electronically signed by: Francisco Javier Flowers  Date:    10/16/2022  Time:    12:24               Narrative:    EXAMINATION:  CTA HEAD    CLINICAL HISTORY:  Headache, sudden, severe;    TECHNIQUE:  CT angiogram was performed from the level of the bottom of C2 to the top of the head following the IV administration of 100 mL of Omnipaque 350.   Sagittal and coronal reconstructions and maximum intensity projection reconstructions were performed.    COMPARISON:  CT head from the same date    FINDINGS:  Intracranial Compartment:    Ventricles and sulci are normal in size for age without evidence of hydrocephalus. No extra-axial blood or fluid collections.    The brain parenchyma appears normal. No parenchymal mass, hemorrhage, edema, or major vascular distribution infarct.    Skull/Extracranial Contents (limited evaluation): No fracture. Mastoid air cells and paranasal sinuses are essentially clear.    Intracranial Arteries: No  focal high-grade stenosis, occlusion, or aneurysm.  Hypoplastic A1 segment of the left anterior cerebral artery noted.  Posterior communicating arteries are not well visualized    Venous structures (limited evaluation): Normal.                                       CT Head Without Contrast (Final result)  Result time 10/16/22 09:44:45      Final result by Francisco Javier Flowers MD (10/16/22 09:44:45)                   Impression:      No acute intracranial abnormality.      Electronically signed by: Francisco Javier Flowers  Date:    10/16/2022  Time:    09:44               Narrative:    EXAMINATION:  CT HEAD WITHOUT CONTRAST    CLINICAL HISTORY:  Headache, new or worsening (Age >= 50y);    TECHNIQUE:  Low dose axial images were obtained through the head.  Coronal and sagittal reformations were also performed. Contrast was not administered.    COMPARISON:  CT dated 08/27/2018    FINDINGS:  Cerebral and ventricular volumes are within normal limits.  Gray matter differentiation is preserved.  No CT evidence of acute territorial infarct, intracranial hemorrhage, or mass lesion.  No midline shift or mass effect.    Midline structures and posterior fossa structures are unremarkable.  Basal cisterns are clear.  Mild bilateral carotid siphon calcifications noted.    Calvarium is intact without acute or aggressive abnormality.  Orbits and globes appear within normal limits.  Visualized paranasal sinuses mastoid air cells are clear.                                       Medications   potassium chloride 10 mEq in 100 mL IVPB (10 mEq Intravenous New Bag 10/16/22 1540)   metoclopramide HCl injection 10 mg (10 mg Intravenous Given 10/16/22 0942)   diphenhydrAMINE injection 12.5 mg (12.5 mg Intravenous Given 10/16/22 0941)   0.9%  NaCl infusion (0 mLs Intravenous Stopped 10/16/22 1056)   potassium bicarbonate disintegrating tablet 40 mEq (40 mEq Oral Given 10/16/22 1141)   magnesium sulfate in dextrose IVPB (premix) 1 g (0 g Intravenous  Stopped 10/16/22 1305)   iohexoL (OMNIPAQUE 350) injection 75 mL (75 mLs Intravenous Given 10/16/22 1214)                 ED Course as of 10/16/22 1621   Sun Oct 16, 2022   1620 4:20 PM Reassessment: I reassessed the pt.  The pt is resting comfortably and is NAD.  Pt states their sx have improved. Discussed test results, shared treatment plan, specific conditions for return, and the need for f/u.  Answered their questions at this time.  Pt understands and agrees to the plan.  The pt has remained hemodynamically stable through ED course and is stable for discharge.    [BA]      ED Course User Index  [BA] Curt Means MD                 Clinical Impression:   Final diagnoses:  [R51.9] Nonintractable headache, unspecified chronicity pattern, unspecified headache type (Primary)  [R11.2] Nausea and vomiting, unspecified vomiting type      ED Disposition Condition    Discharge Stable          ED Prescriptions       Medication Sig Dispense Start Date End Date Auth. Provider    ondansetron (ZOFRAN-ODT) 4 MG TbDL Take 1 tablet (4 mg total) by mouth every 8 (eight) hours as needed. 11 tablet 10/16/2022 10/19/2022 Curt Means MD          Follow-up Information       Follow up With Specialties Details Why Contact Info    Maryanne Beltran NP Family Medicine Schedule an appointment as soon as possible for a visit in 2 days For re-evaluation and further treatment [id number] [address] [address]  [phone]      [facility] - Emergency Dept Emergency Medicine Go today If symptoms worsen, For re-evaluation and further treatment, As needed [id number] [place] [id number] [phone]             Curt Means MD  10/16/22 1621

## 2022-10-17 ENCOUNTER — OFFICE VISIT (OUTPATIENT)
Dept: OTOLARYNGOLOGY | Facility: CLINIC | Age: 57
End: 2022-10-17
Payer: COMMERCIAL

## 2022-10-17 ENCOUNTER — TELEPHONE (OUTPATIENT)
Dept: INTERNAL MEDICINE | Facility: CLINIC | Age: 57
End: 2022-10-17
Payer: COMMERCIAL

## 2022-10-17 VITALS
SYSTOLIC BLOOD PRESSURE: 185 MMHG | TEMPERATURE: 98 F | HEART RATE: 62 BPM | WEIGHT: 143.94 LBS | DIASTOLIC BLOOD PRESSURE: 81 MMHG | BODY MASS INDEX: 27.2 KG/M2

## 2022-10-17 DIAGNOSIS — R51.9 ACUTE NONINTRACTABLE HEADACHE, UNSPECIFIED HEADACHE TYPE: ICD-10-CM

## 2022-10-17 DIAGNOSIS — R11.2 NAUSEA AND VOMITING, UNSPECIFIED VOMITING TYPE: Primary | ICD-10-CM

## 2022-10-17 LAB
HCV AB SERPL QL IA: NEGATIVE
HEP C VIRUS HOLD SPECIMEN: NORMAL
HIV 1+2 AB+HIV1 P24 AG SERPL QL IA: NEGATIVE

## 2022-10-17 PROCEDURE — 3079F PR MOST RECENT DIASTOLIC BLOOD PRESSURE 80-89 MM HG: ICD-10-PCS | Mod: CPTII,S$GLB,, | Performed by: OTOLARYNGOLOGY

## 2022-10-17 PROCEDURE — 3077F PR MOST RECENT SYSTOLIC BLOOD PRESSURE >= 140 MM HG: ICD-10-PCS | Mod: CPTII,S$GLB,, | Performed by: OTOLARYNGOLOGY

## 2022-10-17 PROCEDURE — 99204 OFFICE O/P NEW MOD 45 MIN: CPT | Mod: S$GLB,,, | Performed by: OTOLARYNGOLOGY

## 2022-10-17 PROCEDURE — 99204 PR OFFICE/OUTPT VISIT, NEW, LEVL IV, 45-59 MIN: ICD-10-PCS | Mod: S$GLB,,, | Performed by: OTOLARYNGOLOGY

## 2022-10-17 PROCEDURE — 3079F DIAST BP 80-89 MM HG: CPT | Mod: CPTII,S$GLB,, | Performed by: OTOLARYNGOLOGY

## 2022-10-17 PROCEDURE — 4010F PR ACE/ARB THEARPY RXD/TAKEN: ICD-10-PCS | Mod: CPTII,S$GLB,, | Performed by: OTOLARYNGOLOGY

## 2022-10-17 PROCEDURE — 1159F MED LIST DOCD IN RCRD: CPT | Mod: CPTII,S$GLB,, | Performed by: OTOLARYNGOLOGY

## 2022-10-17 PROCEDURE — 1159F PR MEDICATION LIST DOCUMENTED IN MEDICAL RECORD: ICD-10-PCS | Mod: CPTII,S$GLB,, | Performed by: OTOLARYNGOLOGY

## 2022-10-17 PROCEDURE — 99999 PR PBB SHADOW E&M-EST. PATIENT-LVL III: CPT | Mod: PBBFAC,,, | Performed by: OTOLARYNGOLOGY

## 2022-10-17 PROCEDURE — 3077F SYST BP >= 140 MM HG: CPT | Mod: CPTII,S$GLB,, | Performed by: OTOLARYNGOLOGY

## 2022-10-17 PROCEDURE — 4010F ACE/ARB THERAPY RXD/TAKEN: CPT | Mod: CPTII,S$GLB,, | Performed by: OTOLARYNGOLOGY

## 2022-10-17 PROCEDURE — 99999 PR PBB SHADOW E&M-EST. PATIENT-LVL III: ICD-10-PCS | Mod: PBBFAC,,, | Performed by: OTOLARYNGOLOGY

## 2022-10-17 NOTE — TELEPHONE ENCOUNTER
Patient went to ER for vomiting on yesterday, CAT scan was done both with and without contrast, both normal. Labs was done and her potassium was low, was given three bas of potassium and sent home. Patient stated was feeling better got up this morning and about 8:30 started vomit again. No diarrhea or congestion. Patient took Zofran and is lying down. Not sure what's going on

## 2022-10-17 NOTE — TELEPHONE ENCOUNTER
Rest and stay well hydrated with electrolyte rich fluids. Continue Zofran. If symptoms persist, would recommend scheduling an appointment to be evaluated. If symptoms are severe, recommend going to ER.

## 2022-10-17 NOTE — PROGRESS NOTES
Referring Provider:    Aaareferral Self  No address on file  Subjective:   Patient: Janette Lee [id number], :1965   Visit date:10/17/2022 4:26 PM    Chief Complaint:  Other    HPI:    Prior notes reviewed by myself.  Clinical documentation obtained by nursing staff reviewed.     56-year-old female presents with recent acute onset intractable emesis and nausea.  This started yesterday and she was seen in the emergency department.  She had a extensive workup including a CT angiogram of her head which was normal.  She was diagnosed with a low potassium level but this was replaced.  She denies any acute spinning dizziness or changes in her hearing or tinnitus.  She did have a history of BPPV in the past and had some nausea and vomiting at this prior episode.  She has been having headaches that start after the vomiting episodes begin and she localizes these to her occipital area.  She does have a history of migraine HA.      Objective:     Physical Exam:  Vitals:  BP (!) 185/81   Pulse 62   Temp 97.5 °F (36.4 °C) (Temporal)   Wt 65.3 kg (143 lb 15.4 oz)   BMI 27.20 kg/m²   General appearance:  Well developed, well nourished    Ears:  Otoscopy of external auditory canals and tympanic membranes was normal, clinical speech reception thresholds grossly intact, no mass/lesion of auricle.    Nose:  No masses/lesions of external nose, nasal mucosa, septum, and turbinates were within normal limits.    Mouth:  No mass/lesion of lips, teeth, gums, hard/soft palate, tongue, tonsils, or oropharynx.    Neck & Lymphatics:  No cervical lymphadenopathy, no neck mass/crepitus/ asymmetry, trachea is midline, no thyroid enlargement/tenderness/mass.    Neuro:  CN II-XII intact bilaterally      [x]  Data Reviewed:    Lab Results   Component Value Date    WBC 10.60 10/16/2022    HGB 14.6 10/16/2022    HCT 42.5 10/16/2022    MCV 93 10/16/2022    EOSINOPHIL 0.4 10/16/2022     Independent review:  Normal CTA    CTA Brain  Order:  [id number]  Status: Final result     Visible to patient: Yes (seen)     Next appt: 10/18/2022 at 03:40 PM in Internal Medicine (Maryanne Beltran NP)     0 Result Notes  Details    Reading Physician Reading Date Result Priority   Francisco Javier Flowers MD  [phone] 10/16/2022 STAT     Narrative & Impression  EXAMINATION:  CTA HEAD     CLINICAL HISTORY:  Headache, sudden, severe;     TECHNIQUE:  CT angiogram was performed from the level of the bottom of C2 to the top of the head following the IV administration of 100 mL of Omnipaque 350.   Sagittal and coronal reconstructions and maximum intensity projection reconstructions were performed.     COMPARISON:  CT head from the same date     FINDINGS:  Intracranial Compartment:     Ventricles and sulci are normal in size for age without evidence of hydrocephalus. No extra-axial blood or fluid collections.     The brain parenchyma appears normal. No parenchymal mass, hemorrhage, edema, or major vascular distribution infarct.     Skull/Extracranial Contents (limited evaluation): No fracture. Mastoid air cells and paranasal sinuses are essentially clear.     Intracranial Arteries: No focal high-grade stenosis, occlusion, or aneurysm.  Hypoplastic A1 segment of the left anterior cerebral artery noted.  Posterior communicating arteries are not well visualized     Venous structures (limited evaluation): Normal.     Impression:     No acute abnormality. No significant intracranial stenosis, occlusion, aneurysm, or vascular malformation.        Electronically signed by: Francisco Javier Flowers  Date:                                            10/16/2022  Time:                                           12:24           Exam Ended: 10/16/22 12:14 Last Resulted: 10/16/22 12:24       Order Details     View Encounter     Lab and Collection Details     Routing     Result History     View Encounter Conversation           Result Care Coordination      Patient Communication     Add Comments    Seen                 Assessment & Plan:   Nausea and vomiting, unspecified vomiting type    Acute nonintractable headache, unspecified headache type      She has had severe nausea and vomiting since yesterday.  She felt as if this might be consistent with another round of BPPV.  She has not had any spinning vertigo whatsoever, so I explained that this was unlikely.  We are going to arrange for her to have a Hallpike and I have also encouraged her to schedule an appointment with her PCP to look for other possible etiologies.

## 2022-10-17 NOTE — TELEPHONE ENCOUNTER
Call and spoke with patient, who states she had this a few years ago and it was related to cystal in her ear. She has made an appointment with ENT and will let us know on tomorrow if she need to schedule an appointment

## 2022-10-17 NOTE — TELEPHONE ENCOUNTER
----- Message from Keanu Beck sent at 10/17/2022  9:26 AM CDT -----  Contact: Pt [phone]  [email or web address]     Patient is calling for Medical Advice regarding: Vomiting and headaches     How long has patient had these symptoms: 2 days     Pharmacy name and phone#:[facility] - [place], [place] - [address] [address]    Phone:  [phone]  Fax:  [phone]        Would like response via [employer]: call    Comments:

## 2022-10-20 ENCOUNTER — PATIENT MESSAGE (OUTPATIENT)
Dept: INTERNAL MEDICINE | Facility: CLINIC | Age: 57
End: 2022-10-20
Payer: COMMERCIAL

## 2022-10-20 DIAGNOSIS — I10 ESSENTIAL HYPERTENSION: ICD-10-CM

## 2022-10-21 ENCOUNTER — PATIENT MESSAGE (OUTPATIENT)
Dept: INTERNAL MEDICINE | Facility: CLINIC | Age: 57
End: 2022-10-21
Payer: COMMERCIAL

## 2022-10-21 DIAGNOSIS — I10 ESSENTIAL HYPERTENSION: ICD-10-CM

## 2022-10-21 RX ORDER — LOSARTAN POTASSIUM 100 MG/1
50 TABLET ORAL DAILY
Qty: 90 TABLET | Refills: 3 | Status: SHIPPED | OUTPATIENT
Start: 2022-10-21 | End: 2023-06-05

## 2022-10-26 ENCOUNTER — OFFICE VISIT (OUTPATIENT)
Dept: INTERNAL MEDICINE | Facility: CLINIC | Age: 57
End: 2022-10-26
Payer: COMMERCIAL

## 2022-10-26 ENCOUNTER — LAB VISIT (OUTPATIENT)
Dept: LAB | Facility: HOSPITAL | Age: 57
End: 2022-10-26
Attending: NURSE PRACTITIONER
Payer: COMMERCIAL

## 2022-10-26 VITALS
OXYGEN SATURATION: 97 % | SYSTOLIC BLOOD PRESSURE: 122 MMHG | DIASTOLIC BLOOD PRESSURE: 68 MMHG | WEIGHT: 142 LBS | TEMPERATURE: 98 F | HEIGHT: 61 IN | HEART RATE: 91 BPM | BODY MASS INDEX: 26.81 KG/M2

## 2022-10-26 DIAGNOSIS — E87.6 HYPOKALEMIA: ICD-10-CM

## 2022-10-26 DIAGNOSIS — R51.9 NONINTRACTABLE EPISODIC HEADACHE, UNSPECIFIED HEADACHE TYPE: ICD-10-CM

## 2022-10-26 DIAGNOSIS — R51.9 NONINTRACTABLE EPISODIC HEADACHE, UNSPECIFIED HEADACHE TYPE: Primary | ICD-10-CM

## 2022-10-26 DIAGNOSIS — E66.3 OVERWEIGHT WITH BODY MASS INDEX (BMI) OF 26 TO 26.9 IN ADULT: ICD-10-CM

## 2022-10-26 DIAGNOSIS — I10 ESSENTIAL HYPERTENSION: ICD-10-CM

## 2022-10-26 DIAGNOSIS — R11.0 NAUSEA: ICD-10-CM

## 2022-10-26 LAB
ANION GAP SERPL CALC-SCNC: 13 MMOL/L (ref 8–16)
BUN SERPL-MCNC: 18 MG/DL (ref 6–20)
CALCIUM SERPL-MCNC: 9.9 MG/DL (ref 8.7–10.5)
CHLORIDE SERPL-SCNC: 102 MMOL/L (ref 95–110)
CO2 SERPL-SCNC: 30 MMOL/L (ref 23–29)
CREAT SERPL-MCNC: 0.8 MG/DL (ref 0.5–1.4)
EST. GFR  (NO RACE VARIABLE): >60 ML/MIN/1.73 M^2
GLUCOSE SERPL-MCNC: 103 MG/DL (ref 70–110)
MAGNESIUM SERPL-MCNC: 1.9 MG/DL (ref 1.6–2.6)
POTASSIUM SERPL-SCNC: 4.6 MMOL/L (ref 3.5–5.1)
SODIUM SERPL-SCNC: 145 MMOL/L (ref 136–145)

## 2022-10-26 PROCEDURE — 90471 FLU VACCINE (QUAD) GREATER THAN OR EQUAL TO 3YO PRESERVATIVE FREE IM: ICD-10-PCS | Mod: S$GLB,,, | Performed by: NURSE PRACTITIONER

## 2022-10-26 PROCEDURE — 36415 COLL VENOUS BLD VENIPUNCTURE: CPT | Mod: PO | Performed by: NURSE PRACTITIONER

## 2022-10-26 PROCEDURE — 4010F PR ACE/ARB THEARPY RXD/TAKEN: ICD-10-PCS | Mod: CPTII,S$GLB,, | Performed by: NURSE PRACTITIONER

## 2022-10-26 PROCEDURE — 1159F MED LIST DOCD IN RCRD: CPT | Mod: CPTII,S$GLB,, | Performed by: NURSE PRACTITIONER

## 2022-10-26 PROCEDURE — 99214 PR OFFICE/OUTPT VISIT, EST, LEVL IV, 30-39 MIN: ICD-10-PCS | Mod: 25,S$GLB,, | Performed by: NURSE PRACTITIONER

## 2022-10-26 PROCEDURE — 1159F PR MEDICATION LIST DOCUMENTED IN MEDICAL RECORD: ICD-10-PCS | Mod: CPTII,S$GLB,, | Performed by: NURSE PRACTITIONER

## 2022-10-26 PROCEDURE — 3078F PR MOST RECENT DIASTOLIC BLOOD PRESSURE < 80 MM HG: ICD-10-PCS | Mod: CPTII,S$GLB,, | Performed by: NURSE PRACTITIONER

## 2022-10-26 PROCEDURE — 3074F PR MOST RECENT SYSTOLIC BLOOD PRESSURE < 130 MM HG: ICD-10-PCS | Mod: CPTII,S$GLB,, | Performed by: NURSE PRACTITIONER

## 2022-10-26 PROCEDURE — 3074F SYST BP LT 130 MM HG: CPT | Mod: CPTII,S$GLB,, | Performed by: NURSE PRACTITIONER

## 2022-10-26 PROCEDURE — 4010F ACE/ARB THERAPY RXD/TAKEN: CPT | Mod: CPTII,S$GLB,, | Performed by: NURSE PRACTITIONER

## 2022-10-26 PROCEDURE — 99999 PR PBB SHADOW E&M-EST. PATIENT-LVL IV: CPT | Mod: PBBFAC,,, | Performed by: NURSE PRACTITIONER

## 2022-10-26 PROCEDURE — 90686 IIV4 VACC NO PRSV 0.5 ML IM: CPT | Mod: S$GLB,,, | Performed by: NURSE PRACTITIONER

## 2022-10-26 PROCEDURE — 83735 ASSAY OF MAGNESIUM: CPT | Mod: PO | Performed by: NURSE PRACTITIONER

## 2022-10-26 PROCEDURE — 90471 IMMUNIZATION ADMIN: CPT | Mod: S$GLB,,, | Performed by: NURSE PRACTITIONER

## 2022-10-26 PROCEDURE — 3078F DIAST BP <80 MM HG: CPT | Mod: CPTII,S$GLB,, | Performed by: NURSE PRACTITIONER

## 2022-10-26 PROCEDURE — 80048 BASIC METABOLIC PNL TOTAL CA: CPT | Mod: PO | Performed by: NURSE PRACTITIONER

## 2022-10-26 PROCEDURE — 90686 FLU VACCINE (QUAD) GREATER THAN OR EQUAL TO 3YO PRESERVATIVE FREE IM: ICD-10-PCS | Mod: S$GLB,,, | Performed by: NURSE PRACTITIONER

## 2022-10-26 PROCEDURE — 99999 PR PBB SHADOW E&M-EST. PATIENT-LVL IV: ICD-10-PCS | Mod: PBBFAC,,, | Performed by: NURSE PRACTITIONER

## 2022-10-26 PROCEDURE — 99214 OFFICE O/P EST MOD 30 MIN: CPT | Mod: 25,S$GLB,, | Performed by: NURSE PRACTITIONER

## 2022-10-26 RX ORDER — ONDANSETRON 4 MG/1
4 TABLET, FILM COATED ORAL EVERY 6 HOURS PRN
Qty: 20 TABLET | Refills: 0 | Status: SHIPPED | OUTPATIENT
Start: 2022-10-26 | End: 2024-01-31 | Stop reason: SDUPTHER

## 2022-10-26 NOTE — ASSESSMENT & PLAN NOTE
Rechecking potassium to see if levels went back down. Possible contributor to symptoms since started back once supplement was completed.

## 2022-10-26 NOTE — ASSESSMENT & PLAN NOTE
Blood pressure within normal range since adding back on 50 mg of losartan. Continue current dose.

## 2022-10-26 NOTE — PROGRESS NOTES
Subjective:       Patient ID: Janette Lee is a 56 y.o. female.    Chief Complaint: Migraine    Mrs. Lee presents to visit for complaint of migraine. Reports having episode of n/v then severe headache. Worst episode was yesterday. Improved with ibuprofen, nausea resolved, but still has mild headache to posterior head. Went to ED on 10/16, had significant work up with CT head and CTA brain, no concerning findings on images. Had low potassium noted, replenished in ed and discharged with PO supplement. Completed a couple of days ago. Did not have headache for 8 days until yesterday. Thought it was attributed to elevated blood pressure so restarted losartan 50 mg on Friday. Blood pressure well controlled today. Had vitamin labs checked by surgeon in July without any concerning findings.     Migraine   This is a new problem. The current episode started in the past 7 days. The problem occurs intermittently. The problem has been waxing and waning. The pain is located in the Occipital region. The pain does not radiate. The pain is mild. Associated symptoms include vomiting (resolved). Pertinent negatives include no abdominal pain, abnormal behavior, back pain, blurred vision, coughing, dizziness, ear pain, eye watering, facial sweating, fever, hearing loss, loss of balance, muscle aches, nausea, neck pain, numbness, rhinorrhea, scalp tenderness, sinus pressure, sore throat, tingling, tinnitus or weakness. Nothing aggravates the symptoms. She has tried NSAIDs for the symptoms. The treatment provided moderate relief. Her past medical history is significant for hypertension.     Patient Active Problem List   Diagnosis    Nonintractable headache    Essential hypertension    Adenoma of left adrenal gland    Varicose veins of both legs with edema    Onychomycosis    PERFECTO on CPAP    Anxiety    Hypercholesteremia    Bilateral leg cramps    Overweight with body mass index (BMI) of 26 to 26.9 in adult    Chronic bilateral  low back pain without sciatica       Family History   Problem Relation Age of Onset    Hypertension Mother     Diabetes Father     Hypertension Father     Heart disease Father      Past Surgical History:   Procedure Laterality Date    arthroscopic surgery Bilateral     knees     SECTION      CHOLECYSTECTOMY      EYE SURGERY  2017    gastric sleeve      REPAIR OF COLLATERAL LIGAMENT OF THUMB           Current Outpatient Medications:     chlorthalidone (HYGROTEN) 25 MG Tab, Take 1 tablet (25 mg total) by mouth once daily., Disp: 90 tablet, Rfl: 3    DULoxetine (CYMBALTA) 60 MG capsule, Take 1 capsule (60 mg total) by mouth once daily., Disp: 90 capsule, Rfl: 3    KLOR-CON M20 20 mEq tablet, TAKE 1 TABLET DAILY, Disp: 90 tablet, Rfl: 3    losartan (COZAAR) 100 MG tablet, Take 0.5 tablets (50 mg total) by mouth once daily., Disp: 90 tablet, Rfl: 3    verapamiL (VERELAN) 360 MG C24P, Take 1 capsule (360 mg total) by mouth once daily., Disp: 90 capsule, Rfl: 3    ondansetron (ZOFRAN) 4 MG tablet, Take 1 tablet (4 mg total) by mouth every 6 (six) hours as needed for Nausea., Disp: 20 tablet, Rfl: 0    Review of Systems   Constitutional:  Negative for activity change, fever and unexpected weight change.   HENT:  Negative for ear pain, hearing loss, rhinorrhea, sinus pressure, sore throat, tinnitus and trouble swallowing.    Eyes:  Negative for blurred vision, discharge and visual disturbance.   Respiratory:  Negative for cough, chest tightness and wheezing.    Cardiovascular:  Negative for chest pain and palpitations.   Gastrointestinal:  Positive for vomiting (resolved). Negative for abdominal pain, blood in stool, constipation, diarrhea and nausea.   Endocrine: Negative for polydipsia and polyuria.   Genitourinary:  Negative for difficulty urinating, dysuria, hematuria and menstrual problem.   Musculoskeletal:  Negative for arthralgias, back pain, joint swelling and neck pain.   Neurological:  Positive for  "headaches. Negative for dizziness, tingling, weakness, numbness and loss of balance.   Psychiatric/Behavioral:  Negative for confusion and dysphoric mood.      Objective:   /68 (BP Location: Left arm, Patient Position: Sitting)   Pulse 91   Temp 98.1 °F (36.7 °C)   Ht 5' 1" (1.549 m)   Wt 64.4 kg (141 lb 15.6 oz)   SpO2 97%   BMI 26.83 kg/m²      Physical Exam  Constitutional:       General: She is not in acute distress.     Appearance: Normal appearance. She is not ill-appearing.   Cardiovascular:      Rate and Rhythm: Normal rate and regular rhythm.      Pulses: Normal pulses.      Heart sounds: Normal heart sounds. No murmur heard.    No gallop.   Pulmonary:      Effort: Pulmonary effort is normal. No respiratory distress.      Breath sounds: Normal breath sounds. No wheezing.   Skin:     General: Skin is warm and dry.      Coloration: Skin is not pale.      Findings: No erythema.   Neurological:      Mental Status: She is alert and oriented to person, place, and time.      GCS: GCS eye subscore is 4. GCS verbal subscore is 5. GCS motor subscore is 6.      Cranial Nerves: No dysarthria or facial asymmetry.      Motor: Motor function is intact.       Assessment & Plan     Problem List Items Addressed This Visit          Neuro    Nonintractable headache - Primary    Current Assessment & Plan     Rechecking potassium to see if levels went back down. Possible contributor to symptoms since started back once supplement was completed.          Relevant Orders    BASIC METABOLIC PANEL (Completed)    Magnesium (Completed)       Cardiac/Vascular    Essential hypertension    Current Assessment & Plan     Blood pressure within normal range since adding back on 50 mg of losartan. Continue current dose.             Endocrine    Overweight with body mass index (BMI) of 26 to 26.9 in adult    Current Assessment & Plan     80 lb weight loss since sx. Keep up the good work.           Other Visit Diagnoses       " "Hypokalemia        Relevant Orders    BASIC METABOLIC PANEL (Completed)    Nausea        Relevant Medications    ondansetron (ZOFRAN) 4 MG tablet        Rechecking labs today. If potassium low, will send supplement. If no abnormal labs, will send fiorcet for temp relief. If become persistent, will send to neuro and get MRI.     Follow up if symptoms worsen or fail to improve.          Portions of this note may have been created with voice recognition software. Occasional "wrong-word" or "sound-a-like" substitutions may have occurred due to the inherent limitations of voice recognition software. Please, read the note carefully and recognize, using context, where substitutions have occurred.         "

## 2022-10-27 DIAGNOSIS — R51.9 NONINTRACTABLE EPISODIC HEADACHE, UNSPECIFIED HEADACHE TYPE: Primary | ICD-10-CM

## 2022-10-27 DIAGNOSIS — I10 ESSENTIAL HYPERTENSION: ICD-10-CM

## 2022-10-27 DIAGNOSIS — E87.6 HYPOKALEMIA: ICD-10-CM

## 2022-10-27 RX ORDER — POTASSIUM CHLORIDE 20 MEQ/1
20 TABLET, EXTENDED RELEASE ORAL DAILY
Qty: 90 TABLET | Refills: 3 | Status: SHIPPED | OUTPATIENT
Start: 2022-10-27

## 2022-10-27 RX ORDER — BUTALBITAL, ACETAMINOPHEN AND CAFFEINE 50; 325; 40 MG/1; MG/1; MG/1
1 TABLET ORAL EVERY 4 HOURS PRN
Qty: 30 TABLET | Refills: 0 | Status: SHIPPED | OUTPATIENT
Start: 2022-10-27 | End: 2023-06-05 | Stop reason: SDUPTHER

## 2022-11-01 ENCOUNTER — PATIENT MESSAGE (OUTPATIENT)
Dept: INTERNAL MEDICINE | Facility: CLINIC | Age: 57
End: 2022-11-01

## 2023-01-12 ENCOUNTER — PATIENT MESSAGE (OUTPATIENT)
Dept: INTERNAL MEDICINE | Facility: CLINIC | Age: 58
End: 2023-01-12
Payer: COMMERCIAL

## 2023-01-12 DIAGNOSIS — F43.21 GRIEF REACTION: Primary | ICD-10-CM

## 2023-01-12 RX ORDER — ALPRAZOLAM 0.5 MG/1
0.5 TABLET ORAL 2 TIMES DAILY PRN
Qty: 10 TABLET | Refills: 0 | Status: SHIPPED | OUTPATIENT
Start: 2023-01-12

## 2023-06-05 ENCOUNTER — LAB VISIT (OUTPATIENT)
Dept: LAB | Facility: HOSPITAL | Age: 58
End: 2023-06-05
Attending: NURSE PRACTITIONER
Payer: COMMERCIAL

## 2023-06-05 ENCOUNTER — OFFICE VISIT (OUTPATIENT)
Dept: INTERNAL MEDICINE | Facility: CLINIC | Age: 58
End: 2023-06-05
Payer: COMMERCIAL

## 2023-06-05 VITALS
DIASTOLIC BLOOD PRESSURE: 70 MMHG | OXYGEN SATURATION: 98 % | SYSTOLIC BLOOD PRESSURE: 136 MMHG | WEIGHT: 139.31 LBS | HEART RATE: 83 BPM | HEIGHT: 61 IN | TEMPERATURE: 97 F | BODY MASS INDEX: 26.3 KG/M2

## 2023-06-05 DIAGNOSIS — I10 ESSENTIAL HYPERTENSION: ICD-10-CM

## 2023-06-05 DIAGNOSIS — R73.03 PREDIABETES: ICD-10-CM

## 2023-06-05 DIAGNOSIS — F41.9 ANXIETY: ICD-10-CM

## 2023-06-05 DIAGNOSIS — E78.00 HYPERCHOLESTEREMIA: ICD-10-CM

## 2023-06-05 DIAGNOSIS — R51.9 NONINTRACTABLE EPISODIC HEADACHE, UNSPECIFIED HEADACHE TYPE: Primary | ICD-10-CM

## 2023-06-05 LAB
ALBUMIN SERPL BCP-MCNC: 4.2 G/DL (ref 3.5–5.2)
ALP SERPL-CCNC: 96 U/L (ref 55–135)
ALT SERPL W/O P-5'-P-CCNC: 45 U/L (ref 10–44)
ANION GAP SERPL CALC-SCNC: 12 MMOL/L (ref 8–16)
AST SERPL-CCNC: 33 U/L (ref 10–40)
BASOPHILS # BLD AUTO: 0.05 K/UL (ref 0–0.2)
BASOPHILS NFR BLD: 0.7 % (ref 0–1.9)
BILIRUB SERPL-MCNC: 0.4 MG/DL (ref 0.1–1)
BUN SERPL-MCNC: 19 MG/DL (ref 6–20)
CALCIUM SERPL-MCNC: 9.9 MG/DL (ref 8.7–10.5)
CHLORIDE SERPL-SCNC: 97 MMOL/L (ref 95–110)
CO2 SERPL-SCNC: 33 MMOL/L (ref 23–29)
CREAT SERPL-MCNC: 0.7 MG/DL (ref 0.5–1.4)
DIFFERENTIAL METHOD: ABNORMAL
EOSINOPHIL # BLD AUTO: 0.2 K/UL (ref 0–0.5)
EOSINOPHIL NFR BLD: 2.6 % (ref 0–8)
ERYTHROCYTE [DISTWIDTH] IN BLOOD BY AUTOMATED COUNT: 11.9 % (ref 11.5–14.5)
EST. GFR  (NO RACE VARIABLE): >60 ML/MIN/1.73 M^2
ESTIMATED AVG GLUCOSE: 103 MG/DL (ref 68–131)
GLUCOSE SERPL-MCNC: 108 MG/DL (ref 70–110)
HBA1C MFR BLD: 5.2 % (ref 4–5.6)
HCT VFR BLD AUTO: 43.1 % (ref 37–48.5)
HGB BLD-MCNC: 14.6 G/DL (ref 12–16)
IMM GRANULOCYTES # BLD AUTO: 0.03 K/UL (ref 0–0.04)
IMM GRANULOCYTES NFR BLD AUTO: 0.4 % (ref 0–0.5)
LYMPHOCYTES # BLD AUTO: 2.2 K/UL (ref 1–4.8)
LYMPHOCYTES NFR BLD: 32.6 % (ref 18–48)
MCH RBC QN AUTO: 31.7 PG (ref 27–31)
MCHC RBC AUTO-ENTMCNC: 33.9 G/DL (ref 32–36)
MCV RBC AUTO: 94 FL (ref 82–98)
MONOCYTES # BLD AUTO: 0.6 K/UL (ref 0.3–1)
MONOCYTES NFR BLD: 9.2 % (ref 4–15)
NEUTROPHILS # BLD AUTO: 3.7 K/UL (ref 1.8–7.7)
NEUTROPHILS NFR BLD: 54.5 % (ref 38–73)
NRBC BLD-RTO: 0 /100 WBC
PLATELET # BLD AUTO: 257 K/UL (ref 150–450)
PMV BLD AUTO: 9.2 FL (ref 9.2–12.9)
POTASSIUM SERPL-SCNC: 3.8 MMOL/L (ref 3.5–5.1)
PROT SERPL-MCNC: 7.8 G/DL (ref 6–8.4)
RBC # BLD AUTO: 4.61 M/UL (ref 4–5.4)
SODIUM SERPL-SCNC: 142 MMOL/L (ref 136–145)
WBC # BLD AUTO: 6.84 K/UL (ref 3.9–12.7)

## 2023-06-05 PROCEDURE — 83036 HEMOGLOBIN GLYCOSYLATED A1C: CPT | Performed by: NURSE PRACTITIONER

## 2023-06-05 PROCEDURE — 3075F PR MOST RECENT SYSTOLIC BLOOD PRESS GE 130-139MM HG: ICD-10-PCS | Mod: CPTII,S$GLB,, | Performed by: NURSE PRACTITIONER

## 2023-06-05 PROCEDURE — 1160F RVW MEDS BY RX/DR IN RCRD: CPT | Mod: CPTII,S$GLB,, | Performed by: NURSE PRACTITIONER

## 2023-06-05 PROCEDURE — 99214 OFFICE O/P EST MOD 30 MIN: CPT | Mod: S$GLB,,, | Performed by: NURSE PRACTITIONER

## 2023-06-05 PROCEDURE — 80053 COMPREHEN METABOLIC PANEL: CPT | Mod: PO | Performed by: NURSE PRACTITIONER

## 2023-06-05 PROCEDURE — 3008F PR BODY MASS INDEX (BMI) DOCUMENTED: ICD-10-PCS | Mod: CPTII,S$GLB,, | Performed by: NURSE PRACTITIONER

## 2023-06-05 PROCEDURE — 85025 COMPLETE CBC W/AUTO DIFF WBC: CPT | Mod: PO | Performed by: NURSE PRACTITIONER

## 2023-06-05 PROCEDURE — 3008F BODY MASS INDEX DOCD: CPT | Mod: CPTII,S$GLB,, | Performed by: NURSE PRACTITIONER

## 2023-06-05 PROCEDURE — 99999 PR PBB SHADOW E&M-EST. PATIENT-LVL V: CPT | Mod: PBBFAC,,, | Performed by: NURSE PRACTITIONER

## 2023-06-05 PROCEDURE — 3075F SYST BP GE 130 - 139MM HG: CPT | Mod: CPTII,S$GLB,, | Performed by: NURSE PRACTITIONER

## 2023-06-05 PROCEDURE — 3078F PR MOST RECENT DIASTOLIC BLOOD PRESSURE < 80 MM HG: ICD-10-PCS | Mod: CPTII,S$GLB,, | Performed by: NURSE PRACTITIONER

## 2023-06-05 PROCEDURE — 99999 PR PBB SHADOW E&M-EST. PATIENT-LVL V: ICD-10-PCS | Mod: PBBFAC,,, | Performed by: NURSE PRACTITIONER

## 2023-06-05 PROCEDURE — 36415 COLL VENOUS BLD VENIPUNCTURE: CPT | Mod: PO | Performed by: NURSE PRACTITIONER

## 2023-06-05 PROCEDURE — 1160F PR REVIEW ALL MEDS BY PRESCRIBER/CLIN PHARMACIST DOCUMENTED: ICD-10-PCS | Mod: CPTII,S$GLB,, | Performed by: NURSE PRACTITIONER

## 2023-06-05 PROCEDURE — 99214 PR OFFICE/OUTPT VISIT, EST, LEVL IV, 30-39 MIN: ICD-10-PCS | Mod: S$GLB,,, | Performed by: NURSE PRACTITIONER

## 2023-06-05 PROCEDURE — 1159F MED LIST DOCD IN RCRD: CPT | Mod: CPTII,S$GLB,, | Performed by: NURSE PRACTITIONER

## 2023-06-05 PROCEDURE — 3078F DIAST BP <80 MM HG: CPT | Mod: CPTII,S$GLB,, | Performed by: NURSE PRACTITIONER

## 2023-06-05 PROCEDURE — 1159F PR MEDICATION LIST DOCUMENTED IN MEDICAL RECORD: ICD-10-PCS | Mod: CPTII,S$GLB,, | Performed by: NURSE PRACTITIONER

## 2023-06-05 RX ORDER — DULOXETIN HYDROCHLORIDE 60 MG/1
60 CAPSULE, DELAYED RELEASE ORAL DAILY
Qty: 90 CAPSULE | Refills: 3 | Status: SHIPPED | OUTPATIENT
Start: 2023-06-05 | End: 2024-02-19 | Stop reason: SDUPTHER

## 2023-06-05 RX ORDER — BUTALBITAL, ACETAMINOPHEN AND CAFFEINE 50; 325; 40 MG/1; MG/1; MG/1
1 TABLET ORAL EVERY 4 HOURS PRN
Qty: 30 TABLET | Refills: 0 | Status: SHIPPED | OUTPATIENT
Start: 2023-06-05 | End: 2023-12-04

## 2023-06-05 RX ORDER — SUMATRIPTAN 50 MG/1
50 TABLET, FILM COATED ORAL ONCE AS NEEDED
Qty: 9 TABLET | Refills: 1 | Status: SHIPPED | OUTPATIENT
Start: 2023-06-05 | End: 2023-06-05

## 2023-06-05 RX ORDER — MULTIVITAMIN
1 TABLET ORAL DAILY
COMMUNITY

## 2023-06-05 RX ORDER — VERAPAMIL HYDROCHLORIDE 360 MG/1
360 CAPSULE, DELAYED RELEASE PELLETS ORAL DAILY
Qty: 90 CAPSULE | Refills: 3 | Status: SHIPPED | OUTPATIENT
Start: 2023-06-05 | End: 2024-02-19 | Stop reason: SDUPTHER

## 2023-06-05 RX ORDER — CALCIUM CARBONATE 600 MG
600 TABLET ORAL ONCE
COMMUNITY

## 2023-06-05 RX ORDER — CHLORTHALIDONE 25 MG/1
25 TABLET ORAL DAILY
Qty: 90 TABLET | Refills: 3 | Status: SHIPPED | OUTPATIENT
Start: 2023-06-05 | End: 2024-01-05 | Stop reason: SINTOL

## 2023-06-05 NOTE — PROGRESS NOTES
Subjective:       Patient ID: Janette Lee is a 57 y.o. female.    Chief Complaint: Migraine and Medication Refill (Handicap tag)    Mrs. Lee presents to visit for medication refills and handicap tag completion.  Reports that her migraines have becoming more frequent.  She had an episode for the past 2 Saturdays.  Lasting no longer than 24 hours.  History of migraines, but has not had any problems since her ablation 30 years ago.  Headache is located in the posterior occipital area, but radiates into orbital area.  Does have some mild relief with Fioricet and Tylenol.  Denies any blurred vision, photophobia, phonophobia, vomiting, confusion, slurred speech, extremity weakness, or mood changes.  Does report having nausea with 1 of the episodes over a week ago.  CT and CTA of head in December without any concerns.    Migraine   This is a recurrent problem. The current episode started more than 1 month ago. The problem occurs intermittently. The problem has been waxing and waning. The pain is located in the Occipital and retro-orbital region. The pain does not radiate. The quality of the pain is described as aching. The pain is at a severity of 0/10. Associated symptoms include nausea. Pertinent negatives include no abdominal pain, abnormal behavior, anorexia, back pain, blurred vision, coughing, dizziness, ear pain, eye pain, facial sweating, fever, hearing loss, insomnia, loss of balance, muscle aches, neck pain, numbness, phonophobia, photophobia, rhinorrhea, scalp tenderness, seizures, sinus pressure, sore throat, tingling, tinnitus, visual change, vomiting or weakness. Nothing aggravates the symptoms. She has tried acetaminophen (fiorcet) for the symptoms. The treatment provided moderate relief. Her past medical history is significant for hypertension and migraine headaches.     Patient Active Problem List   Diagnosis    Nonintractable headache    Essential hypertension    Adenoma of left adrenal gland     Varicose veins of both legs with edema    Onychomycosis    PERFECTO on CPAP    Anxiety    Hypercholesteremia    Bilateral leg cramps    Overweight with body mass index (BMI) of 26 to 26.9 in adult    Chronic bilateral low back pain without sciatica    Prediabetes       Family History   Problem Relation Age of Onset    Hypertension Mother     Diabetes Father     Hypertension Father     Heart disease Father      Past Surgical History:   Procedure Laterality Date    arthroscopic surgery Bilateral     knees     SECTION      CHOLECYSTECTOMY      EYE SURGERY  2017    gastric sleeve      REPAIR OF COLLATERAL LIGAMENT OF THUMB           Current Outpatient Medications:     ALPRAZolam (XANAX) 0.5 MG tablet, Take 1 tablet (0.5 mg total) by mouth 2 (two) times daily as needed for Anxiety., Disp: 10 tablet, Rfl: 0    calcium carbonate (OS-MARLENE) 600 mg calcium (1,500 mg) Tab, Take 600 mg by mouth once., Disp: , Rfl:     multivitamin (ONE DAILY MULTIVITAMIN) per tablet, Take 1 tablet by mouth once daily., Disp: , Rfl:     ondansetron (ZOFRAN) 4 MG tablet, Take 1 tablet (4 mg total) by mouth every 6 (six) hours as needed for Nausea., Disp: 20 tablet, Rfl: 0    butalbital-acetaminophen-caffeine -40 mg (FIORICET, ESGIC) -40 mg per tablet, Take 1 tablet by mouth every 4 (four) hours as needed for Headaches., Disp: 30 tablet, Rfl: 0    chlorthalidone (HYGROTEN) 25 MG Tab, Take 1 tablet (25 mg total) by mouth once daily., Disp: 90 tablet, Rfl: 3    DULoxetine (CYMBALTA) 60 MG capsule, Take 1 capsule (60 mg total) by mouth once daily., Disp: 90 capsule, Rfl: 3    potassium chloride SA (KLOR-CON M20) 20 MEQ tablet, Take 1 tablet (20 mEq total) by mouth once daily., Disp: 90 tablet, Rfl: 3    sumatriptan (IMITREX) 50 MG tablet, Take 1 tablet (50 mg total) by mouth once as needed for Migraine (may repeat in 2 hours if no improvement of headache. do not excees 2 doses in 24 hours.)., Disp: 9 tablet, Rfl: 1    verapamiL  "(VERELAN) 360 MG C24P, Take 1 capsule (360 mg total) by mouth once daily., Disp: 90 capsule, Rfl: 3    Review of Systems   Constitutional:  Negative for activity change, chills, fatigue, fever and unexpected weight change.   HENT:  Negative for ear pain, hearing loss, rhinorrhea, sinus pressure, sore throat, tinnitus and trouble swallowing.    Eyes:  Negative for blurred vision, photophobia, pain, discharge and visual disturbance.   Respiratory:  Negative for cough, chest tightness, shortness of breath and wheezing.    Cardiovascular:  Negative for chest pain and palpitations.   Gastrointestinal:  Positive for nausea. Negative for abdominal pain, anorexia, blood in stool, constipation, diarrhea and vomiting.   Endocrine: Negative for polydipsia and polyuria.   Genitourinary:  Negative for difficulty urinating, dysuria, hematuria and menstrual problem.   Musculoskeletal:  Negative for arthralgias, back pain, joint swelling and neck pain.   Neurological:  Positive for headaches. Negative for dizziness, tingling, tremors, seizures, syncope, speech difficulty, weakness, light-headedness, numbness and loss of balance.   Psychiatric/Behavioral:  Negative for confusion, dysphoric mood and sleep disturbance. The patient is not nervous/anxious and does not have insomnia.      Objective:   /70 (BP Location: Left arm, Patient Position: Sitting, BP Method: Medium (Manual))   Pulse 83   Temp 96.8 °F (36 °C) (Temporal)   Ht 5' 1" (1.549 m)   Wt 63.2 kg (139 lb 5.3 oz)   SpO2 98%   BMI 26.33 kg/m²      Physical Exam  Vitals reviewed.   Constitutional:       General: She is not in acute distress.     Appearance: Normal appearance. She is well-developed. She is obese. She is not ill-appearing or diaphoretic.   HENT:      Head: Normocephalic.   Cardiovascular:      Rate and Rhythm: Normal rate.      Heart sounds: No murmur heard.  Pulmonary:      Effort: Pulmonary effort is normal. No respiratory distress.   Abdominal:    "   Palpations: Abdomen is soft.      Tenderness: There is no abdominal tenderness.   Musculoskeletal:      Cervical back: Normal range of motion. No tenderness.      Right lower leg: No edema.      Left lower leg: No edema.   Lymphadenopathy:      Cervical: No cervical adenopathy.   Skin:     General: Skin is warm and dry.      Coloration: Skin is not pale.      Findings: No erythema.   Neurological:      Mental Status: She is alert and oriented to person, place, and time.      GCS: GCS eye subscore is 4. GCS verbal subscore is 5. GCS motor subscore is 6.      Cranial Nerves: No dysarthria or facial asymmetry.      Motor: No weakness, tremor or pronator drift.      Gait: Gait is intact.   Psychiatric:         Mood and Affect: Mood normal.       Assessment & Plan     Problem List Items Addressed This Visit          Neuro    Nonintractable headache - Primary    Current Assessment & Plan     Labs today. triptan rx sent. Referral to neuro since headaches or worsening. CT head reviewed from 12/2022.          Relevant Medications    sumatriptan (IMITREX) 50 MG tablet    butalbital-acetaminophen-caffeine -40 mg (FIORICET, ESGIC) -40 mg per tablet    Other Relevant Orders    Ambulatory referral/consult to Neurology       Psychiatric    Anxiety    Current Assessment & Plan     Stable on cymbalta.          Relevant Medications    DULoxetine (CYMBALTA) 60 MG capsule       Cardiac/Vascular    Essential hypertension    Current Assessment & Plan     Blood pressure stable. Continue current medications.          Relevant Medications    chlorthalidone (HYGROTEN) 25 MG Tab    verapamiL (VERELAN) 360 MG C24P    Other Relevant Orders    CBC Auto Differential (Completed)    COMPREHENSIVE METABOLIC PANEL (Completed)    Hypercholesteremia    Current Assessment & Plan     Lipid panel reviewed. Improved with weight loss.             Endocrine    Prediabetes    Current Assessment & Plan     Lab Results   Component Value Date     "HGBA1C 5.7 (H) 09/17/2021   Labs today. Managed with lifestyle modifications.         Relevant Orders    Hemoglobin A1C       Follow up in about 6 months (around 12/5/2023), or if symptoms worsen or fail to improve.          Portions of this note may have been created with voice recognition software. Occasional "wrong-word" or "sound-a-like" substitutions may have occurred due to the inherent limitations of voice recognition software. Please, read the note carefully and recognize, using context, where substitutions have occurred.       "

## 2023-06-05 NOTE — PATIENT INSTRUCTIONS
Avoid using triptans or over the counter medications more than 10 days per month.      3. Natural approaches to increasing brain energy and serotonin:                Vitamin B2 400 mg daily              Vitamin B12 1000 mcg daily              Getting early morning light to increase natural serotonin, melatonin. Could also consider light therapy box, 10,000 LUX.      4. Headache prevention and acute treatment over-the counter supplements              Boswellia 800 mg 2-3 times per day, example brand Pamela's, natural anti inflammatory herb              Sleep? modulation- melatonin at night 5 to 10 mg 30 minutes prior to sleep              Feverfew Tea 1-3 cups per day. Can get from [employer] or [employer]- A Natural anti inflammatory approach that does not cause further sensitization of the system.              Magnesium Citrate 250 mg daily, slowly increase up to 500-1000 mg daily. Side effect may include diarrhea, so we recommend stopping at whatever dose is comfortable for your body without causing this side effect. This supplement can be very helpful for preventing headache, preventing migraine aura, calming nerves, muscles and even mood. Recommend starting slowly, as it can also lead to increased bowel movements or diarrhea.?      5. DIET: I recommend a diet that heavily favors fruits and vegetables while reducing carbs. More information is available upon request.      6. EXERCISE: Gentle movement exercises, concentrate on combination of muscle building and aerobic. I also recommend adding gentle Yoga therapy. The goal is 150 minutes per week of moderate to rigorous exercise, but you should start at your own pace and progress slowly and safely as discussed today.     7. ANXIETY/STRESS- Control stressors with yoga, meditation, counseling, or other methods of mindfulness.      8. SLEEP- aim for 7-8 hrs of restful sleep per night.      9. FUN- Increase fun time spend with friends and family

## 2023-06-05 NOTE — ASSESSMENT & PLAN NOTE
Labs today. triptan rx sent. Referral to neuro since headaches or worsening. CT head reviewed from 12/2022.

## 2023-06-05 NOTE — ASSESSMENT & PLAN NOTE
Lab Results   Component Value Date    HGBA1C 5.7 (H) 09/17/2021     Labs today. Managed with lifestyle modifications.

## 2023-06-06 ENCOUNTER — PATIENT MESSAGE (OUTPATIENT)
Dept: INTERNAL MEDICINE | Facility: CLINIC | Age: 58
End: 2023-06-06
Payer: COMMERCIAL

## 2023-06-06 DIAGNOSIS — R79.81 ELEVATED CO2 LEVEL: Primary | ICD-10-CM

## 2023-06-14 ENCOUNTER — LAB VISIT (OUTPATIENT)
Dept: LAB | Facility: HOSPITAL | Age: 58
End: 2023-06-14
Attending: NURSE PRACTITIONER
Payer: COMMERCIAL

## 2023-06-14 DIAGNOSIS — R79.81 ELEVATED CO2 LEVEL: ICD-10-CM

## 2023-06-14 LAB
ANION GAP SERPL CALC-SCNC: 10 MMOL/L (ref 8–16)
BUN SERPL-MCNC: 14 MG/DL (ref 6–20)
CALCIUM SERPL-MCNC: 9.9 MG/DL (ref 8.7–10.5)
CHLORIDE SERPL-SCNC: 98 MMOL/L (ref 95–110)
CO2 SERPL-SCNC: 35 MMOL/L (ref 23–29)
CREAT SERPL-MCNC: 0.7 MG/DL (ref 0.5–1.4)
EST. GFR  (NO RACE VARIABLE): >60 ML/MIN/1.73 M^2
GLUCOSE SERPL-MCNC: 103 MG/DL (ref 70–110)
POTASSIUM SERPL-SCNC: 3.9 MMOL/L (ref 3.5–5.1)
SODIUM SERPL-SCNC: 143 MMOL/L (ref 136–145)

## 2023-06-14 PROCEDURE — 36415 COLL VENOUS BLD VENIPUNCTURE: CPT | Mod: PO | Performed by: NURSE PRACTITIONER

## 2023-06-14 PROCEDURE — 80048 BASIC METABOLIC PNL TOTAL CA: CPT | Mod: PO | Performed by: NURSE PRACTITIONER

## 2023-06-19 ENCOUNTER — PATIENT MESSAGE (OUTPATIENT)
Dept: INTERNAL MEDICINE | Facility: CLINIC | Age: 58
End: 2023-06-19
Payer: COMMERCIAL

## 2023-08-07 ENCOUNTER — OFFICE VISIT (OUTPATIENT)
Dept: INTERNAL MEDICINE | Facility: CLINIC | Age: 58
End: 2023-08-07
Payer: COMMERCIAL

## 2023-08-07 VITALS
DIASTOLIC BLOOD PRESSURE: 72 MMHG | TEMPERATURE: 97 F | WEIGHT: 139.75 LBS | HEART RATE: 86 BPM | SYSTOLIC BLOOD PRESSURE: 132 MMHG | HEIGHT: 61 IN | OXYGEN SATURATION: 98 % | BODY MASS INDEX: 26.39 KG/M2

## 2023-08-07 DIAGNOSIS — N30.01 ACUTE CYSTITIS WITH HEMATURIA: Primary | ICD-10-CM

## 2023-08-07 DIAGNOSIS — R30.0 DYSURIA: ICD-10-CM

## 2023-08-07 DIAGNOSIS — I10 ESSENTIAL HYPERTENSION: ICD-10-CM

## 2023-08-07 LAB
BACTERIA #/AREA URNS AUTO: ABNORMAL /HPF
BILIRUB UR QL STRIP: NEGATIVE
CLARITY UR REFRACT.AUTO: ABNORMAL
COLOR UR AUTO: YELLOW
GLUCOSE UR QL STRIP: NEGATIVE
HGB UR QL STRIP: ABNORMAL
HYALINE CASTS UR QL AUTO: 0 /LPF
KETONES UR QL STRIP: NEGATIVE
LEUKOCYTE ESTERASE UR QL STRIP: NEGATIVE
MICROSCOPIC COMMENT: ABNORMAL
NITRITE UR QL STRIP: NEGATIVE
PH UR STRIP: 6 [PH] (ref 5–8)
PROT UR QL STRIP: ABNORMAL
RBC #/AREA URNS AUTO: 3 /HPF (ref 0–4)
SP GR UR STRIP: >=1.03 (ref 1–1.03)
URN SPEC COLLECT METH UR: ABNORMAL
UROBILINOGEN UR STRIP-ACNC: NEGATIVE EU/DL
WBC #/AREA URNS AUTO: 1 /HPF (ref 0–5)

## 2023-08-07 PROCEDURE — 3008F PR BODY MASS INDEX (BMI) DOCUMENTED: ICD-10-PCS | Mod: CPTII,S$GLB,, | Performed by: NURSE PRACTITIONER

## 2023-08-07 PROCEDURE — 3044F HG A1C LEVEL LT 7.0%: CPT | Mod: CPTII,S$GLB,, | Performed by: NURSE PRACTITIONER

## 2023-08-07 PROCEDURE — 1160F PR REVIEW ALL MEDS BY PRESCRIBER/CLIN PHARMACIST DOCUMENTED: ICD-10-PCS | Mod: CPTII,S$GLB,, | Performed by: NURSE PRACTITIONER

## 2023-08-07 PROCEDURE — 3078F PR MOST RECENT DIASTOLIC BLOOD PRESSURE < 80 MM HG: ICD-10-PCS | Mod: CPTII,S$GLB,, | Performed by: NURSE PRACTITIONER

## 2023-08-07 PROCEDURE — 3075F SYST BP GE 130 - 139MM HG: CPT | Mod: CPTII,S$GLB,, | Performed by: NURSE PRACTITIONER

## 2023-08-07 PROCEDURE — 1159F PR MEDICATION LIST DOCUMENTED IN MEDICAL RECORD: ICD-10-PCS | Mod: CPTII,S$GLB,, | Performed by: NURSE PRACTITIONER

## 2023-08-07 PROCEDURE — 81000 URINALYSIS NONAUTO W/SCOPE: CPT | Mod: PO | Performed by: NURSE PRACTITIONER

## 2023-08-07 PROCEDURE — 99999 PR PBB SHADOW E&M-EST. PATIENT-LVL IV: CPT | Mod: PBBFAC,,, | Performed by: NURSE PRACTITIONER

## 2023-08-07 PROCEDURE — 3008F BODY MASS INDEX DOCD: CPT | Mod: CPTII,S$GLB,, | Performed by: NURSE PRACTITIONER

## 2023-08-07 PROCEDURE — 1159F MED LIST DOCD IN RCRD: CPT | Mod: CPTII,S$GLB,, | Performed by: NURSE PRACTITIONER

## 2023-08-07 PROCEDURE — 99213 PR OFFICE/OUTPT VISIT, EST, LEVL III, 20-29 MIN: ICD-10-PCS | Mod: S$GLB,,, | Performed by: NURSE PRACTITIONER

## 2023-08-07 PROCEDURE — 99999 PR PBB SHADOW E&M-EST. PATIENT-LVL IV: ICD-10-PCS | Mod: PBBFAC,,, | Performed by: NURSE PRACTITIONER

## 2023-08-07 PROCEDURE — 3044F PR MOST RECENT HEMOGLOBIN A1C LEVEL <7.0%: ICD-10-PCS | Mod: CPTII,S$GLB,, | Performed by: NURSE PRACTITIONER

## 2023-08-07 PROCEDURE — 99213 OFFICE O/P EST LOW 20 MIN: CPT | Mod: S$GLB,,, | Performed by: NURSE PRACTITIONER

## 2023-08-07 PROCEDURE — 3078F DIAST BP <80 MM HG: CPT | Mod: CPTII,S$GLB,, | Performed by: NURSE PRACTITIONER

## 2023-08-07 PROCEDURE — 1160F RVW MEDS BY RX/DR IN RCRD: CPT | Mod: CPTII,S$GLB,, | Performed by: NURSE PRACTITIONER

## 2023-08-07 PROCEDURE — 3075F PR MOST RECENT SYSTOLIC BLOOD PRESS GE 130-139MM HG: ICD-10-PCS | Mod: CPTII,S$GLB,, | Performed by: NURSE PRACTITIONER

## 2023-08-07 RX ORDER — PHENAZOPYRIDINE HYDROCHLORIDE 200 MG/1
200 TABLET, FILM COATED ORAL 3 TIMES DAILY PRN
Qty: 6 TABLET | Refills: 0 | Status: SHIPPED | OUTPATIENT
Start: 2023-08-07 | End: 2023-12-04

## 2023-08-07 RX ORDER — NITROFURANTOIN 25; 75 MG/1; MG/1
100 CAPSULE ORAL 2 TIMES DAILY
Qty: 14 CAPSULE | Refills: 0 | Status: SHIPPED | OUTPATIENT
Start: 2023-08-07 | End: 2023-08-14

## 2023-08-07 RX ORDER — NITROFURANTOIN 25; 75 MG/1; MG/1
100 CAPSULE ORAL 2 TIMES DAILY
Qty: 10 CAPSULE | Refills: 0 | Status: SHIPPED | OUTPATIENT
Start: 2023-08-07 | End: 2023-08-07

## 2023-08-07 NOTE — PROGRESS NOTES
Subjective:       Patient ID: Janette Lee is a 57 y.o. female.    Chief Complaint: Urinary Tract Infection    Dysuria   This is a recurrent problem. The current episode started today. The problem occurs every urination. The problem has been gradually worsening. The quality of the pain is described as burning. The pain is at a severity of 6/10. The pain is mild. There has been no fever. She is Not sexually active. There is No history of pyelonephritis. Associated symptoms include frequency and urgency. Pertinent negatives include no behavior changes, chills, discharge, flank pain, hematuria, hesitancy, nausea, possible pregnancy, sweats, vomiting, weight loss, constipation, rash or withholding. She has tried antibiotics for the symptoms. The treatment provided mild relief. Her past medical history is significant for hypertension, recurrent UTIs and STD. There is no history of catheterization, diabetes insipidus, diabetes mellitus, genitourinary reflux, kidney stones, a single kidney, urinary stasis or a urological procedure.       Patient Active Problem List   Diagnosis    Nonintractable headache    Essential hypertension    Adenoma of left adrenal gland    Varicose veins of both legs with edema    Onychomycosis    PERFECTO on CPAP    Anxiety    Hypercholesteremia    Bilateral leg cramps    Overweight with body mass index (BMI) of 26 to 26.9 in adult    Chronic bilateral low back pain without sciatica    Prediabetes       Family History   Problem Relation Age of Onset    Hypertension Mother     Diabetes Father     Hypertension Father     Heart disease Father      Past Surgical History:   Procedure Laterality Date    arthroscopic surgery Bilateral     knees     SECTION      CHOLECYSTECTOMY      EYE SURGERY  2017    gastric sleeve      REPAIR OF COLLATERAL LIGAMENT OF THUMB           Current Outpatient Medications:     ALPRAZolam (XANAX) 0.5 MG tablet, Take 1 tablet (0.5 mg total) by mouth 2 (two) times  daily as needed for Anxiety., Disp: 10 tablet, Rfl: 0    butalbital-acetaminophen-caffeine -40 mg (FIORICET, ESGIC) -40 mg per tablet, Take 1 tablet by mouth every 4 (four) hours as needed for Headaches., Disp: 30 tablet, Rfl: 0    calcium carbonate (OS-MARLENE) 600 mg calcium (1,500 mg) Tab, Take 600 mg by mouth once., Disp: , Rfl:     chlorthalidone (HYGROTEN) 25 MG Tab, Take 1 tablet (25 mg total) by mouth once daily., Disp: 90 tablet, Rfl: 3    DULoxetine (CYMBALTA) 60 MG capsule, Take 1 capsule (60 mg total) by mouth once daily., Disp: 90 capsule, Rfl: 3    multivitamin (ONE DAILY MULTIVITAMIN) per tablet, Take 1 tablet by mouth once daily., Disp: , Rfl:     ondansetron (ZOFRAN) 4 MG tablet, Take 1 tablet (4 mg total) by mouth every 6 (six) hours as needed for Nausea., Disp: 20 tablet, Rfl: 0    verapamiL (VERELAN) 360 MG C24P, Take 1 capsule (360 mg total) by mouth once daily., Disp: 90 capsule, Rfl: 3    nitrofurantoin, macrocrystal-monohydrate, (MACROBID) 100 MG capsule, Take 1 capsule (100 mg total) by mouth 2 (two) times daily. for 7 days, Disp: 14 capsule, Rfl: 0    phenazopyridine (PYRIDIUM) 200 MG tablet, Take 1 tablet (200 mg total) by mouth 3 (three) times daily as needed for Pain., Disp: 6 tablet, Rfl: 0    potassium chloride SA (KLOR-CON M20) 20 MEQ tablet, Take 1 tablet (20 mEq total) by mouth once daily., Disp: 90 tablet, Rfl: 3    sumatriptan (IMITREX) 50 MG tablet, Take 1 tablet (50 mg total) by mouth once as needed for Migraine (may repeat in 2 hours if no improvement of headache. do not excees 2 doses in 24 hours.)., Disp: 9 tablet, Rfl: 1    Review of Systems   Constitutional:  Negative for chills, fatigue, fever and weight loss.   Respiratory:  Negative for cough and shortness of breath.    Cardiovascular:  Negative for chest pain and palpitations.   Gastrointestinal:  Negative for abdominal pain, blood in stool, constipation, nausea and vomiting.   Genitourinary:  Positive for  "dysuria, frequency and urgency. Negative for flank pain, hematuria and hesitancy.   Skin:  Negative for rash.   Neurological:  Negative for dizziness, light-headedness and headaches.       Objective:   /72 (BP Location: Left arm, Patient Position: Sitting, BP Method: Medium (Manual))   Pulse 86   Temp 97 °F (36.1 °C) (Temporal)   Ht 5' 1" (1.549 m)   Wt 63.4 kg (139 lb 12.4 oz)   SpO2 98%   BMI 26.41 kg/m²      Physical Exam  Constitutional:       General: She is not in acute distress.     Appearance: Normal appearance. She is not ill-appearing.   Cardiovascular:      Rate and Rhythm: Normal rate and regular rhythm.      Pulses: Normal pulses.      Heart sounds: Normal heart sounds. No murmur heard.     No friction rub. No gallop.   Pulmonary:      Effort: Pulmonary effort is normal. No respiratory distress.      Breath sounds: Normal breath sounds. No wheezing.   Abdominal:      Palpations: Abdomen is soft.      Tenderness: There is no abdominal tenderness. There is no right CVA tenderness, left CVA tenderness or guarding.   Skin:     General: Skin is warm and dry.      Coloration: Skin is not pale.      Findings: No erythema.   Neurological:      Mental Status: She is alert and oriented to person, place, and time.   Psychiatric:         Mood and Affect: Mood normal.         Behavior: Behavior normal.         Assessment & Plan     Problem List Items Addressed This Visit          Cardiac/Vascular    Essential hypertension    Current Assessment & Plan     Blood pressure stable. Continue current medications.           Other Visit Diagnoses       Acute cystitis with hematuria    -  Primary    Relevant Medications    phenazopyridine (PYRIDIUM) 200 MG tablet    nitrofurantoin, macrocrystal-monohydrate, (MACROBID) 100 MG capsule    Dysuria        Relevant Orders    Urinalysis, Reflex to Urine Culture Urine, Clean Catch (Completed)          Follow up if symptoms worsen or fail to improve.            Portions of " "this note may have been created with voice recognition software. Occasional "wrong-word" or "sound-a-like" substitutions may have occurred due to the inherent limitations of voice recognition software. Please, read the note carefully and recognize, using context, where substitutions have occurred.       "

## 2023-08-25 ENCOUNTER — TELEPHONE (OUTPATIENT)
Dept: UROLOGY | Facility: CLINIC | Age: 58
End: 2023-08-25
Payer: COMMERCIAL

## 2023-08-25 DIAGNOSIS — N39.0 RECURRENT UTI: Primary | ICD-10-CM

## 2023-09-06 ENCOUNTER — HOSPITAL ENCOUNTER (OUTPATIENT)
Dept: RADIOLOGY | Facility: HOSPITAL | Age: 58
Discharge: HOME | End: 2023-09-06
Attending: UROLOGY
Payer: COMMERCIAL

## 2023-09-06 DIAGNOSIS — N28.1 CYST OF KIDNEY, ACQUIRED: ICD-10-CM

## 2023-09-06 DIAGNOSIS — N39.0 URINARY TRACT INFECTION, SITE NOT SPECIFIED: ICD-10-CM

## 2023-09-06 PROCEDURE — 76770 US RETROPERITONEAL COMPLETE: ICD-10-PCS | Mod: 26,,, | Performed by: STUDENT IN AN ORGANIZED HEALTH CARE EDUCATION/TRAINING PROGRAM

## 2023-09-06 PROCEDURE — 76770 US EXAM ABDO BACK WALL COMP: CPT | Mod: TC,PO

## 2023-09-06 PROCEDURE — 76770 US EXAM ABDO BACK WALL COMP: CPT | Mod: 26,,, | Performed by: STUDENT IN AN ORGANIZED HEALTH CARE EDUCATION/TRAINING PROGRAM

## 2023-10-23 ENCOUNTER — LAB VISIT (OUTPATIENT)
Dept: LAB | Facility: HOSPITAL | Age: 58
End: 2023-10-23
Attending: UROLOGY
Payer: COMMERCIAL

## 2023-10-23 DIAGNOSIS — R30.0 DYSURIA: ICD-10-CM

## 2023-10-23 LAB
BACTERIA #/AREA URNS AUTO: ABNORMAL /HPF
BILIRUB UR QL STRIP: ABNORMAL
CLARITY UR REFRACT.AUTO: ABNORMAL
COLOR UR AUTO: ABNORMAL
GLUCOSE UR QL STRIP: ABNORMAL
HGB UR QL STRIP: ABNORMAL
HYALINE CASTS UR QL AUTO: 0 /LPF
KETONES UR QL STRIP: ABNORMAL
LEUKOCYTE ESTERASE UR QL STRIP: ABNORMAL
MICROSCOPIC COMMENT: ABNORMAL
NITRITE UR QL STRIP: ABNORMAL
PH UR STRIP: ABNORMAL [PH] (ref 5–8)
PROT UR QL STRIP: ABNORMAL
RBC #/AREA URNS AUTO: 3 /HPF (ref 0–4)
SP GR UR STRIP: ABNORMAL (ref 1–1.03)
URN SPEC COLLECT METH UR: ABNORMAL
UROBILINOGEN UR STRIP-ACNC: ABNORMAL EU/DL
WBC #/AREA URNS AUTO: 18 /HPF (ref 0–5)

## 2023-10-23 PROCEDURE — 87184 SC STD DISK METHOD PER PLATE: CPT | Performed by: UROLOGY

## 2023-10-23 PROCEDURE — 81000 URINALYSIS NONAUTO W/SCOPE: CPT | Mod: PO | Performed by: UROLOGY

## 2023-10-23 PROCEDURE — 87086 URINE CULTURE/COLONY COUNT: CPT | Performed by: UROLOGY

## 2023-10-23 PROCEDURE — 87186 SC STD MICRODIL/AGAR DIL: CPT | Mod: 59 | Performed by: UROLOGY

## 2023-10-23 PROCEDURE — 87077 CULTURE AEROBIC IDENTIFY: CPT | Performed by: UROLOGY

## 2023-10-23 PROCEDURE — 87088 URINE BACTERIA CULTURE: CPT | Performed by: UROLOGY

## 2023-10-27 LAB — BACTERIA UR CULT: ABNORMAL

## 2023-12-04 ENCOUNTER — OFFICE VISIT (OUTPATIENT)
Dept: INTERNAL MEDICINE | Facility: CLINIC | Age: 58
End: 2023-12-04
Payer: COMMERCIAL

## 2023-12-04 VITALS
HEART RATE: 85 BPM | BODY MASS INDEX: 26.1 KG/M2 | OXYGEN SATURATION: 96 % | WEIGHT: 138.25 LBS | TEMPERATURE: 98 F | DIASTOLIC BLOOD PRESSURE: 82 MMHG | SYSTOLIC BLOOD PRESSURE: 126 MMHG | HEIGHT: 61 IN

## 2023-12-04 DIAGNOSIS — I10 ESSENTIAL HYPERTENSION: ICD-10-CM

## 2023-12-04 DIAGNOSIS — R59.1 LYMPHADENOPATHY: Primary | ICD-10-CM

## 2023-12-04 PROCEDURE — 1159F PR MEDICATION LIST DOCUMENTED IN MEDICAL RECORD: ICD-10-PCS | Mod: CPTII,S$GLB,, | Performed by: NURSE PRACTITIONER

## 2023-12-04 PROCEDURE — 3079F PR MOST RECENT DIASTOLIC BLOOD PRESSURE 80-89 MM HG: ICD-10-PCS | Mod: CPTII,S$GLB,, | Performed by: NURSE PRACTITIONER

## 2023-12-04 PROCEDURE — 1159F MED LIST DOCD IN RCRD: CPT | Mod: CPTII,S$GLB,, | Performed by: NURSE PRACTITIONER

## 2023-12-04 PROCEDURE — 1160F PR REVIEW ALL MEDS BY PRESCRIBER/CLIN PHARMACIST DOCUMENTED: ICD-10-PCS | Mod: CPTII,S$GLB,, | Performed by: NURSE PRACTITIONER

## 2023-12-04 PROCEDURE — 1160F RVW MEDS BY RX/DR IN RCRD: CPT | Mod: CPTII,S$GLB,, | Performed by: NURSE PRACTITIONER

## 2023-12-04 PROCEDURE — 99213 OFFICE O/P EST LOW 20 MIN: CPT | Mod: S$GLB,,, | Performed by: NURSE PRACTITIONER

## 2023-12-04 PROCEDURE — 99213 PR OFFICE/OUTPT VISIT, EST, LEVL III, 20-29 MIN: ICD-10-PCS | Mod: S$GLB,,, | Performed by: NURSE PRACTITIONER

## 2023-12-04 PROCEDURE — 3044F HG A1C LEVEL LT 7.0%: CPT | Mod: CPTII,S$GLB,, | Performed by: NURSE PRACTITIONER

## 2023-12-04 PROCEDURE — 3079F DIAST BP 80-89 MM HG: CPT | Mod: CPTII,S$GLB,, | Performed by: NURSE PRACTITIONER

## 2023-12-04 PROCEDURE — 3008F BODY MASS INDEX DOCD: CPT | Mod: CPTII,S$GLB,, | Performed by: NURSE PRACTITIONER

## 2023-12-04 PROCEDURE — 3074F SYST BP LT 130 MM HG: CPT | Mod: CPTII,S$GLB,, | Performed by: NURSE PRACTITIONER

## 2023-12-04 PROCEDURE — 99999 PR PBB SHADOW E&M-EST. PATIENT-LVL IV: CPT | Mod: PBBFAC,,, | Performed by: NURSE PRACTITIONER

## 2023-12-04 PROCEDURE — 99999 PR PBB SHADOW E&M-EST. PATIENT-LVL IV: ICD-10-PCS | Mod: PBBFAC,,, | Performed by: NURSE PRACTITIONER

## 2023-12-04 PROCEDURE — 3008F PR BODY MASS INDEX (BMI) DOCUMENTED: ICD-10-PCS | Mod: CPTII,S$GLB,, | Performed by: NURSE PRACTITIONER

## 2023-12-04 PROCEDURE — 3074F PR MOST RECENT SYSTOLIC BLOOD PRESSURE < 130 MM HG: ICD-10-PCS | Mod: CPTII,S$GLB,, | Performed by: NURSE PRACTITIONER

## 2023-12-04 PROCEDURE — 3044F PR MOST RECENT HEMOGLOBIN A1C LEVEL <7.0%: ICD-10-PCS | Mod: CPTII,S$GLB,, | Performed by: NURSE PRACTITIONER

## 2023-12-04 NOTE — ASSESSMENT & PLAN NOTE
Mild swelling to tonsillar region of L side of neck.   Swelling has improved over the past few days.   No signs of infection.   Continue to monitor. If continues to be tender and enlarged, will proceed with labs and possible imaging.

## 2023-12-04 NOTE — PROGRESS NOTES
Subjective:       Patient ID: Janette Lee is a 57 y.o. female.    Chief Complaint: Cyst (Left ear/)    Mr. Lee presents to visit with complaints of lymph nodes swelling to left side of neck.  Onset Saturday, and started with swelling to left side of face on .  Denies any fever, chills, body aches, ear pain, sore throat, nasal congestion, or dental pain.  Symptoms have improved today and swelling has went down.  Area is nontender as well.        Patient Active Problem List   Diagnosis    Nonintractable headache    Essential hypertension    Adenoma of left adrenal gland    Varicose veins of both legs with edema    Onychomycosis    PERFECTO on CPAP    Anxiety    Hypercholesteremia    Bilateral leg cramps    Overweight with body mass index (BMI) of 26 to 26.9 in adult    Chronic bilateral low back pain without sciatica    Prediabetes    Lymphadenopathy       Family History   Problem Relation Age of Onset    Hypertension Mother     Diabetes Father     Hypertension Father     Heart disease Father      Past Surgical History:   Procedure Laterality Date    arthroscopic surgery Bilateral     knees     SECTION      CHOLECYSTECTOMY      EYE SURGERY  2017    gastric sleeve      REPAIR OF COLLATERAL LIGAMENT OF THUMB           Current Outpatient Medications:     ALPRAZolam (XANAX) 0.5 MG tablet, Take 1 tablet (0.5 mg total) by mouth 2 (two) times daily as needed for Anxiety., Disp: 10 tablet, Rfl: 0    chlorthalidone (HYGROTEN) 25 MG Tab, Take 1 tablet (25 mg total) by mouth once daily., Disp: 90 tablet, Rfl: 3    DULoxetine (CYMBALTA) 60 MG capsule, Take 1 capsule (60 mg total) by mouth once daily., Disp: 90 capsule, Rfl: 3    multivitamin (ONE DAILY MULTIVITAMIN) per tablet, Take 1 tablet by mouth once daily., Disp: , Rfl:     ondansetron (ZOFRAN) 4 MG tablet, Take 1 tablet (4 mg total) by mouth every 6 (six) hours as needed for Nausea., Disp: 20 tablet, Rfl: 0    potassium chloride SA (KLOR-CON M20)  "20 MEQ tablet, Take 1 tablet (20 mEq total) by mouth once daily., Disp: 90 tablet, Rfl: 3    verapamiL (VERELAN) 360 MG C24P, Take 1 capsule (360 mg total) by mouth once daily., Disp: 90 capsule, Rfl: 3    calcium carbonate (OS-MARLENE) 600 mg calcium (1,500 mg) Tab, Take 600 mg by mouth once., Disp: , Rfl:     sumatriptan (IMITREX) 50 MG tablet, Take 1 tablet (50 mg total) by mouth once as needed for Migraine (may repeat in 2 hours if no improvement of headache. do not excees 2 doses in 24 hours.)., Disp: 9 tablet, Rfl: 1    Review of Systems   Constitutional:  Negative for activity change and unexpected weight change.   HENT:  Negative for hearing loss, rhinorrhea and trouble swallowing.    Eyes:  Negative for discharge and visual disturbance.   Respiratory:  Negative for chest tightness and wheezing.    Cardiovascular:  Negative for chest pain and palpitations.   Gastrointestinal:  Negative for blood in stool, constipation, diarrhea and vomiting.   Endocrine: Negative for polydipsia and polyuria.   Genitourinary:  Negative for difficulty urinating, dysuria, hematuria and menstrual problem.   Musculoskeletal:  Negative for arthralgias, joint swelling and neck pain.   Neurological:  Negative for weakness and headaches.   Psychiatric/Behavioral:  Negative for confusion and dysphoric mood.        Objective:   /82 (BP Location: Left arm, Patient Position: Sitting, BP Method: Medium (Manual))   Pulse 85   Temp 97.9 °F (36.6 °C) (Tympanic)   Ht 5' 1" (1.549 m)   Wt 62.7 kg (138 lb 3.7 oz)   SpO2 96%   BMI 26.12 kg/m²      Physical Exam  Constitutional:       General: She is not in acute distress.     Appearance: Normal appearance. She is not ill-appearing.   HENT:      Head: Normocephalic.   Cardiovascular:      Rate and Rhythm: Normal rate and regular rhythm.      Pulses: Normal pulses.      Heart sounds: Normal heart sounds. No murmur heard.     No friction rub. No gallop.   Pulmonary:      Effort: Pulmonary " "effort is normal. No respiratory distress.      Breath sounds: Normal breath sounds. No wheezing.   Lymphadenopathy:      Head:      Right side of head: No submental, submandibular, tonsillar, preauricular, posterior auricular or occipital adenopathy.      Left side of head: Tonsillar adenopathy present. No submental, submandibular, preauricular, posterior auricular or occipital adenopathy.      Cervical:      Right cervical: No superficial, deep or posterior cervical adenopathy.     Left cervical: No superficial, deep or posterior cervical adenopathy.   Skin:     General: Skin is warm and dry.      Coloration: Skin is not pale.      Findings: No erythema.   Neurological:      Mental Status: She is alert and oriented to person, place, and time.   Psychiatric:         Mood and Affect: Mood normal.         Assessment & Plan     Problem List Items Addressed This Visit          Cardiac/Vascular    Essential hypertension    Current Assessment & Plan     Blood pressure stable. On current medications.            Other    Lymphadenopathy - Primary    Current Assessment & Plan     Mild swelling to tonsillar region of L side of neck.   Swelling has improved over the past few days.   No signs of infection.   Continue to monitor. If continues to be tender and enlarged, will proceed with labs and possible imaging.             Follow up if symptoms worsen or fail to improve.          Portions of this note may have been created with voice recognition software. Occasional "wrong-word" or "sound-a-like" substitutions may have occurred due to the inherent limitations of voice recognition software. Please, read the note carefully and recognize, using context, where substitutions have occurred.       "

## 2024-01-01 ENCOUNTER — PATIENT MESSAGE (OUTPATIENT)
Dept: INTERNAL MEDICINE | Facility: CLINIC | Age: 59
End: 2024-01-01
Payer: COMMERCIAL

## 2024-01-01 DIAGNOSIS — I10 ESSENTIAL HYPERTENSION: Primary | ICD-10-CM

## 2024-01-05 RX ORDER — LOSARTAN POTASSIUM 50 MG/1
50 TABLET ORAL DAILY
Qty: 30 TABLET | Refills: 0 | Status: SHIPPED | OUTPATIENT
Start: 2024-01-05 | End: 2024-02-08 | Stop reason: SDUPTHER

## 2024-01-26 NOTE — TELEPHONE ENCOUNTER
Humphrey,    I don't know why she has a ct chest ordered.  I ordered a ct abd/pelvis with and without.  When she rescheduled it somehow a chest ct was added.  I don't need the chest ct so it can be cancelled.      I do need the abdomen and pelvis CT done, so please make sure they don't cancel it.    Approval number [id number]   see MDM/progress notes

## 2024-01-31 ENCOUNTER — PATIENT MESSAGE (OUTPATIENT)
Dept: INTERNAL MEDICINE | Facility: CLINIC | Age: 59
End: 2024-01-31
Payer: COMMERCIAL

## 2024-01-31 DIAGNOSIS — R11.0 NAUSEA: ICD-10-CM

## 2024-01-31 RX ORDER — ONDANSETRON 4 MG/1
4 TABLET, FILM COATED ORAL EVERY 6 HOURS PRN
Qty: 20 TABLET | Refills: 0 | Status: SHIPPED | OUTPATIENT
Start: 2024-01-31

## 2024-02-08 DIAGNOSIS — I10 ESSENTIAL HYPERTENSION: ICD-10-CM

## 2024-02-08 RX ORDER — LOSARTAN POTASSIUM 50 MG/1
50 TABLET ORAL DAILY
Qty: 30 TABLET | Refills: 0 | Status: SHIPPED | OUTPATIENT
Start: 2024-02-08 | End: 2024-03-11

## 2024-02-19 DIAGNOSIS — I10 ESSENTIAL HYPERTENSION: ICD-10-CM

## 2024-02-19 DIAGNOSIS — F41.9 ANXIETY: ICD-10-CM

## 2024-02-20 RX ORDER — DULOXETIN HYDROCHLORIDE 60 MG/1
60 CAPSULE, DELAYED RELEASE ORAL DAILY
Qty: 90 CAPSULE | Refills: 3 | Status: SHIPPED | OUTPATIENT
Start: 2024-02-20

## 2024-02-20 RX ORDER — VERAPAMIL HYDROCHLORIDE 360 MG/1
360 CAPSULE, DELAYED RELEASE PELLETS ORAL DAILY
Qty: 90 CAPSULE | Refills: 3 | Status: SHIPPED | OUTPATIENT
Start: 2024-02-20

## 2024-03-09 DIAGNOSIS — I10 ESSENTIAL HYPERTENSION: ICD-10-CM

## 2024-03-11 RX ORDER — LOSARTAN POTASSIUM 50 MG/1
50 TABLET ORAL DAILY
Qty: 90 TABLET | Refills: 3 | Status: SHIPPED | OUTPATIENT
Start: 2024-03-11

## 2024-04-10 ENCOUNTER — PATIENT MESSAGE (OUTPATIENT)
Dept: INTERNAL MEDICINE | Facility: CLINIC | Age: 59
End: 2024-04-10
Payer: COMMERCIAL

## 2024-04-10 DIAGNOSIS — R51.9 NONINTRACTABLE EPISODIC HEADACHE, UNSPECIFIED HEADACHE TYPE: ICD-10-CM

## 2024-04-10 RX ORDER — BUTALBITAL, ACETAMINOPHEN AND CAFFEINE 50; 325; 40 MG/1; MG/1; MG/1
1 TABLET ORAL EVERY 6 HOURS PRN
Qty: 30 TABLET | Refills: 1 | Status: SHIPPED | OUTPATIENT
Start: 2024-04-10 | End: 2024-04-10 | Stop reason: SDUPTHER

## 2024-04-10 RX ORDER — BUTALBITAL, ACETAMINOPHEN AND CAFFEINE 50; 325; 40 MG/1; MG/1; MG/1
1 TABLET ORAL EVERY 6 HOURS PRN
Qty: 30 TABLET | Refills: 1 | Status: SHIPPED | OUTPATIENT
Start: 2024-04-10

## 2024-05-17 DIAGNOSIS — R51.9 NONINTRACTABLE EPISODIC HEADACHE, UNSPECIFIED HEADACHE TYPE: ICD-10-CM

## 2024-05-17 RX ORDER — SUMATRIPTAN 50 MG/1
50 TABLET, FILM COATED ORAL ONCE AS NEEDED
Qty: 9 TABLET | Refills: 1 | Status: SHIPPED | OUTPATIENT
Start: 2024-05-17 | End: 2024-06-13

## 2024-06-13 ENCOUNTER — LAB VISIT (OUTPATIENT)
Dept: LAB | Facility: HOSPITAL | Age: 59
End: 2024-06-13
Attending: NURSE PRACTITIONER
Payer: COMMERCIAL

## 2024-06-13 ENCOUNTER — OFFICE VISIT (OUTPATIENT)
Dept: INTERNAL MEDICINE | Facility: CLINIC | Age: 59
End: 2024-06-13
Payer: COMMERCIAL

## 2024-06-13 VITALS
SYSTOLIC BLOOD PRESSURE: 126 MMHG | BODY MASS INDEX: 26.27 KG/M2 | DIASTOLIC BLOOD PRESSURE: 80 MMHG | HEIGHT: 61 IN | OXYGEN SATURATION: 96 % | TEMPERATURE: 98 F | WEIGHT: 139.13 LBS | RESPIRATION RATE: 14 BRPM | HEART RATE: 80 BPM

## 2024-06-13 DIAGNOSIS — R51.9 NONINTRACTABLE EPISODIC HEADACHE, UNSPECIFIED HEADACHE TYPE: ICD-10-CM

## 2024-06-13 DIAGNOSIS — R73.03 PREDIABETES: ICD-10-CM

## 2024-06-13 DIAGNOSIS — Z00.00 ROUTINE ADULT HEALTH MAINTENANCE: ICD-10-CM

## 2024-06-13 DIAGNOSIS — I10 ESSENTIAL HYPERTENSION: ICD-10-CM

## 2024-06-13 DIAGNOSIS — G47.33 OSA ON CPAP: ICD-10-CM

## 2024-06-13 DIAGNOSIS — Z00.00 ROUTINE ADULT HEALTH MAINTENANCE: Primary | ICD-10-CM

## 2024-06-13 DIAGNOSIS — K21.9 GASTROESOPHAGEAL REFLUX DISEASE, UNSPECIFIED WHETHER ESOPHAGITIS PRESENT: ICD-10-CM

## 2024-06-13 DIAGNOSIS — F41.9 ANXIETY: ICD-10-CM

## 2024-06-13 DIAGNOSIS — E78.5 HYPERLIPIDEMIA LDL GOAL <130: ICD-10-CM

## 2024-06-13 PROBLEM — Z98.84 S/P LAPAROSCOPIC SLEEVE GASTRECTOMY: Status: ACTIVE | Noted: 2023-12-20

## 2024-06-13 LAB
ANION GAP SERPL CALC-SCNC: 11 MMOL/L (ref 8–16)
BASOPHILS # BLD AUTO: 0.04 K/UL (ref 0–0.2)
BASOPHILS NFR BLD: 1 % (ref 0–1.9)
BUN SERPL-MCNC: 12 MG/DL (ref 6–20)
CALCIUM SERPL-MCNC: 9.7 MG/DL (ref 8.7–10.5)
CHLORIDE SERPL-SCNC: 103 MMOL/L (ref 95–110)
CHOLEST SERPL-MCNC: 216 MG/DL (ref 120–199)
CHOLEST/HDLC SERPL: 2.6 {RATIO} (ref 2–5)
CO2 SERPL-SCNC: 27 MMOL/L (ref 23–29)
CREAT SERPL-MCNC: 0.7 MG/DL (ref 0.5–1.4)
DIFFERENTIAL METHOD BLD: ABNORMAL
EOSINOPHIL # BLD AUTO: 0.2 K/UL (ref 0–0.5)
EOSINOPHIL NFR BLD: 4.7 % (ref 0–8)
ERYTHROCYTE [DISTWIDTH] IN BLOOD BY AUTOMATED COUNT: 12 % (ref 11.5–14.5)
EST. GFR  (NO RACE VARIABLE): >60 ML/MIN/1.73 M^2
ESTIMATED AVG GLUCOSE: 100 MG/DL (ref 68–131)
GLUCOSE SERPL-MCNC: 97 MG/DL (ref 70–110)
HBA1C MFR BLD: 5.1 % (ref 4–5.6)
HCT VFR BLD AUTO: 38.1 % (ref 37–48.5)
HDLC SERPL-MCNC: 82 MG/DL (ref 40–75)
HDLC SERPL: 38 % (ref 20–50)
HGB BLD-MCNC: 13.1 G/DL (ref 12–16)
IMM GRANULOCYTES # BLD AUTO: 0 K/UL (ref 0–0.04)
IMM GRANULOCYTES NFR BLD AUTO: 0 % (ref 0–0.5)
LDLC SERPL CALC-MCNC: 124.8 MG/DL (ref 63–159)
LYMPHOCYTES # BLD AUTO: 1.5 K/UL (ref 1–4.8)
LYMPHOCYTES NFR BLD: 36.5 % (ref 18–48)
MCH RBC QN AUTO: 32.6 PG (ref 27–31)
MCHC RBC AUTO-ENTMCNC: 34.4 G/DL (ref 32–36)
MCV RBC AUTO: 95 FL (ref 82–98)
MONOCYTES # BLD AUTO: 0.4 K/UL (ref 0.3–1)
MONOCYTES NFR BLD: 9.4 % (ref 4–15)
NEUTROPHILS # BLD AUTO: 2 K/UL (ref 1.8–7.7)
NEUTROPHILS NFR BLD: 48.4 % (ref 38–73)
NONHDLC SERPL-MCNC: 134 MG/DL
NRBC BLD-RTO: 0 /100 WBC
PLATELET # BLD AUTO: 216 K/UL (ref 150–450)
PMV BLD AUTO: 9.8 FL (ref 9.2–12.9)
POTASSIUM SERPL-SCNC: 4.1 MMOL/L (ref 3.5–5.1)
RBC # BLD AUTO: 4.02 M/UL (ref 4–5.4)
SODIUM SERPL-SCNC: 141 MMOL/L (ref 136–145)
TRIGL SERPL-MCNC: 46 MG/DL (ref 30–150)
WBC # BLD AUTO: 4.06 K/UL (ref 3.9–12.7)

## 2024-06-13 PROCEDURE — 3008F BODY MASS INDEX DOCD: CPT | Mod: CPTII,S$GLB,, | Performed by: NURSE PRACTITIONER

## 2024-06-13 PROCEDURE — 3079F DIAST BP 80-89 MM HG: CPT | Mod: CPTII,S$GLB,, | Performed by: NURSE PRACTITIONER

## 2024-06-13 PROCEDURE — 3074F SYST BP LT 130 MM HG: CPT | Mod: CPTII,S$GLB,, | Performed by: NURSE PRACTITIONER

## 2024-06-13 PROCEDURE — 99999 PR PBB SHADOW E&M-EST. PATIENT-LVL IV: CPT | Mod: PBBFAC,,, | Performed by: NURSE PRACTITIONER

## 2024-06-13 PROCEDURE — 99396 PREV VISIT EST AGE 40-64: CPT | Mod: S$GLB,,, | Performed by: NURSE PRACTITIONER

## 2024-06-13 PROCEDURE — 80061 LIPID PANEL: CPT | Performed by: NURSE PRACTITIONER

## 2024-06-13 PROCEDURE — 36415 COLL VENOUS BLD VENIPUNCTURE: CPT | Mod: PO | Performed by: NURSE PRACTITIONER

## 2024-06-13 PROCEDURE — 85025 COMPLETE CBC W/AUTO DIFF WBC: CPT | Mod: PO | Performed by: NURSE PRACTITIONER

## 2024-06-13 PROCEDURE — 1159F MED LIST DOCD IN RCRD: CPT | Mod: CPTII,S$GLB,, | Performed by: NURSE PRACTITIONER

## 2024-06-13 PROCEDURE — 83036 HEMOGLOBIN GLYCOSYLATED A1C: CPT | Performed by: NURSE PRACTITIONER

## 2024-06-13 PROCEDURE — 4010F ACE/ARB THERAPY RXD/TAKEN: CPT | Mod: CPTII,S$GLB,, | Performed by: NURSE PRACTITIONER

## 2024-06-13 PROCEDURE — 1160F RVW MEDS BY RX/DR IN RCRD: CPT | Mod: CPTII,S$GLB,, | Performed by: NURSE PRACTITIONER

## 2024-06-13 PROCEDURE — 80048 BASIC METABOLIC PNL TOTAL CA: CPT | Mod: PO | Performed by: NURSE PRACTITIONER

## 2024-06-13 NOTE — PROGRESS NOTES
Subjective:       Patient ID: Janette Lee is a 58 y.o. female.    Chief Complaint: Annual Exam (Migraine headaches)    Ms. Major presents to visit for annual wellness exam and labs.  Eye exam up-to-date   Has been exercising more with riding her bike and has continue to improve diet.  Upcoming appointment for mammogram and well-woman exam in July.  Would like handicap tag for intermittent chronic back pain.  Patient also concerned that migraines/headaches may be hormone related.  Patient reports that it always happens every 2 weeks.  Headache is relieved by Imitrex and Fioricet.    Hypertension  This is a chronic problem. The current episode started more than 1 year ago. The problem has been waxing and waning since onset. The problem is controlled. Associated symptoms include headaches. Pertinent negatives include no anxiety, blurred vision, chest pain, malaise/fatigue, neck pain, palpitations, peripheral edema or shortness of breath. There are no associated agents to hypertension. Risk factors for coronary artery disease include dyslipidemia, post-menopausal state and sedentary lifestyle. Past treatments include angiotensin blockers and calcium channel blockers. The current treatment provides significant improvement. There are no compliance problems.  There is no history of angina, kidney disease, CAD/MI, CVA, heart failure, left ventricular hypertrophy, PVD or retinopathy.       Patient Active Problem List   Diagnosis    Nonintractable headache    Essential hypertension    Adenoma of left adrenal gland    Varicose veins of both legs with edema    Onychomycosis    PERFECTO on CPAP    Anxiety    Hyperlipidemia LDL goal <130    Bilateral leg cramps    Overweight with body mass index (BMI) of 26 to 26.9 in adult    Chronic bilateral low back pain without sciatica    Prediabetes    Lymphadenopathy    S/P laparoscopic sleeve gastrectomy    GERD (gastroesophageal reflux disease)       Family History   Problem  Relation Name Age of Onset    Hypertension Mother      Diabetes Father      Hypertension Father      Heart disease Father       Past Surgical History:   Procedure Laterality Date    arthroscopic surgery Bilateral     knees     SECTION      CHOLECYSTECTOMY      EYE SURGERY  2017    gastric sleeve      REPAIR OF COLLATERAL LIGAMENT OF THUMB           Current Outpatient Medications:     ALPRAZolam (XANAX) 0.5 MG tablet, Take 1 tablet (0.5 mg total) by mouth 2 (two) times daily as needed for Anxiety., Disp: 10 tablet, Rfl: 0    butalbital-acetaminophen-caffeine -40 mg (FIORICET, ESGIC) -40 mg per tablet, Take 1 tablet by mouth every 6 (six) hours as needed for Headaches., Disp: 30 tablet, Rfl: 1    calcium carbonate (OS-MARLENE) 600 mg calcium (1,500 mg) Tab, Take 600 mg by mouth once., Disp: , Rfl:     DULoxetine (CYMBALTA) 60 MG capsule, Take 1 capsule (60 mg total) by mouth once daily., Disp: 90 capsule, Rfl: 3    losartan (COZAAR) 50 MG tablet, Take 1 tablet (50 mg total) by mouth once daily., Disp: 90 tablet, Rfl: 3    multivitamin (ONE DAILY MULTIVITAMIN) per tablet, Take 1 tablet by mouth once daily., Disp: , Rfl:     ondansetron (ZOFRAN) 4 MG tablet, Take 1 tablet (4 mg total) by mouth every 6 (six) hours as needed for Nausea., Disp: 20 tablet, Rfl: 0    potassium chloride SA (KLOR-CON M20) 20 MEQ tablet, Take 1 tablet (20 mEq total) by mouth once daily., Disp: 90 tablet, Rfl: 3    sumatriptan (IMITREX) 50 MG tablet, Take 1 tablet (50 mg total) by mouth once as needed for Migraine (may repeat in 2 hours if no improvement of headache. do not excees 2 doses in 24 hours.)., Disp: 9 tablet, Rfl: 1    verapamiL (VERELAN) 360 MG C24P, Take 1 capsule (360 mg total) by mouth once daily., Disp: 90 capsule, Rfl: 3    Review of Systems   Constitutional:  Negative for activity change, appetite change, chills, fatigue, fever, malaise/fatigue and unexpected weight change.   HENT:  Negative for hearing  "loss, rhinorrhea and trouble swallowing.    Eyes:  Negative for blurred vision, discharge and visual disturbance.   Respiratory:  Negative for cough, chest tightness, shortness of breath and wheezing.    Cardiovascular:  Negative for chest pain and palpitations.   Gastrointestinal:  Negative for abdominal pain, blood in stool, constipation, diarrhea, nausea and vomiting.   Endocrine: Negative for polydipsia and polyuria.   Genitourinary:  Negative for difficulty urinating, dysuria, hematuria and menstrual problem.   Musculoskeletal:  Negative for arthralgias, joint swelling and neck pain.   Neurological:  Positive for headaches. Negative for dizziness, weakness and light-headedness.   Psychiatric/Behavioral:  Negative for confusion and dysphoric mood. The patient is not nervous/anxious.        Objective:   /80 (BP Location: Left arm, Patient Position: Sitting, BP Method: Large (Manual))   Pulse 80   Temp 97.9 °F (36.6 °C) (Oral)   Resp 14   Ht 5' 1" (1.549 m)   Wt 63.1 kg (139 lb 1.8 oz)   SpO2 96%   BMI 26.28 kg/m²      Physical Exam  Vitals reviewed.   Constitutional:       General: She is not in acute distress.     Appearance: Normal appearance. She is well-developed. She is not ill-appearing or diaphoretic.   HENT:      Head: Normocephalic.      Right Ear: Tympanic membrane normal.      Left Ear: Tympanic membrane normal.      Nose: Nose normal.      Mouth/Throat:      Mouth: Mucous membranes are moist.      Pharynx: Oropharynx is clear. No oropharyngeal exudate or posterior oropharyngeal erythema.   Eyes:      General:         Right eye: No discharge.         Left eye: No discharge.      Extraocular Movements: Extraocular movements intact.      Conjunctiva/sclera: Conjunctivae normal.      Pupils: Pupils are equal, round, and reactive to light.   Cardiovascular:      Rate and Rhythm: Normal rate and regular rhythm.      Pulses: Normal pulses.      Heart sounds: Normal heart sounds. No murmur " heard.     No friction rub. No gallop.   Pulmonary:      Effort: Pulmonary effort is normal. No respiratory distress.      Breath sounds: Normal breath sounds. No rales.   Abdominal:      Palpations: Abdomen is soft.      Tenderness: There is no abdominal tenderness.   Musculoskeletal:         General: Normal range of motion.      Cervical back: Normal range of motion and neck supple.   Skin:     General: Skin is warm and dry.      Coloration: Skin is not pale.      Findings: No erythema.   Neurological:      Mental Status: She is alert and oriented to person, place, and time.   Psychiatric:         Mood and Affect: Mood normal.         Behavior: Behavior normal.         Assessment & Plan     Problem List Items Addressed This Visit          Neuro    Nonintractable headache    Current Assessment & Plan     Has been every two weeks. May be hormonal. Relieved with fiorcet and imitrex.             Psychiatric    Anxiety    Current Assessment & Plan     Stable on cymbalta.             Cardiac/Vascular    Essential hypertension    Current Assessment & Plan     Blood pressure stable. Continue current medications.         Hyperlipidemia LDL goal <130    Current Assessment & Plan     Lipid panel today. Lifestyle modifications.          Relevant Orders    Lipid Panel       Endocrine    Prediabetes    Current Assessment & Plan     Labs today. Lifestyle modifications.         Relevant Orders    Hemoglobin A1C       GI    GERD (gastroesophageal reflux disease)    Current Assessment & Plan     Lifestyle modifications. No current symptoms.             Other    PERFECTO on CPAP     Other Visit Diagnoses       Routine adult health maintenance    -  Primary    Relevant Orders    CBC Auto Differential    BASIC METABOLIC PANEL    Lipid Panel    Hemoglobin A1C        No concerns on exam. Labs today.       Follow up in about 1 year (around 6/13/2025), or if symptoms worsen or fail to improve, for annual wellness .            Portions of this  "note may have been created with voice recognition software. Occasional "wrong-word" or "sound-a-like" substitutions may have occurred due to the inherent limitations of voice recognition software. Please, read the note carefully and recognize, using context, where substitutions have occurred.       "

## 2024-06-13 NOTE — PATIENT INSTRUCTIONS
Avoid using triptans or over the counter medications more than 10 days per month.      3. Natural approaches to increasing brain energy and serotonin:              SAMe 200 mg a day              Vitamin B2 400 mg daily              Vitamin B12 1000 mcg daily              Getting early morning light to increase natural serotonin, melatonin. Could also consider light therapy box, 10,000 LUX.      4. Headache prevention and acute treatment over-the counter supplements              Boswellia 800 mg 2-3 times per day, example brand Pamela's, natural anti inflammatory herb              Sleep? modulation- melatonin at night 5 to 10 mg 30 minutes prior to sleep              Feverfew Tea 1-3 cups per day. Can get from [employer] or [email or web address]- A Natural anti inflammatory approach that does not cause further sensitization of the system.              Magnesium Citrate 250 mg daily, slowly increase up to 500-1000 mg daily. Side effect may include diarrhea, so we recommend stopping at whatever dose is comfortable for your body without causing this side effect. This supplement can be very helpful for preventing headache, preventing migraine aura, calming nerves, muscles and even mood. Recommend starting slowly, as it can also lead to increased bowel movements or diarrhea.?      5. DIET: I recommend a diet that heavily favors fruits and vegetables while reducing carbs. More information is available upon request.      6. EXERCISE: Gentle movement exercises, concentrate on combination of muscle building and aerobic. I also recommend adding gentle Yoga therapy. The goal is 150 minutes per week of moderate to rigorous exercise, but you should start at your own pace and progress slowly and safely as discussed today.     7. ANXIETY/STRESS- Control stressors with yoga, meditation, counseling, or other methods of mindfulness.      8. SLEEP- aim for 7-8 hrs of restful sleep per night.      9. FUN- Increase fun time spend with  friends and family      10. Patients that suffer with Migraine with aura are discouraged from using high dose estrogen birth control as this is associated with increased risk of stroke. This was discussed with the patient who endorsed understanding of the recommendation.

## 2024-07-16 ENCOUNTER — PATIENT MESSAGE (OUTPATIENT)
Dept: ADMINISTRATIVE | Facility: HOSPITAL | Age: 59
End: 2024-07-16
Payer: COMMERCIAL

## 2024-07-29 DIAGNOSIS — R51.9 NONINTRACTABLE EPISODIC HEADACHE, UNSPECIFIED HEADACHE TYPE: ICD-10-CM

## 2024-07-30 RX ORDER — SUMATRIPTAN 50 MG/1
50 TABLET, FILM COATED ORAL ONCE AS NEEDED
Qty: 9 TABLET | Refills: 1 | Status: SHIPPED | OUTPATIENT
Start: 2024-07-30 | End: 2024-07-30

## 2024-07-30 NOTE — TELEPHONE ENCOUNTER
Refill Routing Note   Medication(s) are not appropriate for processing by [facility] for the following reason(s):        Non-participating provider    [facility] action(s):  Route             Appointments  past 12m or future 3m with PCP    Date Provider   Last Visit   6/13/2024 Maryanne Beltran NP   Next Visit   Visit date not found Maryanne Beltran NP   ED visits in past 90 days: 0        Note composed:11:10 PM 07/29/2024

## 2024-10-18 ENCOUNTER — PATIENT MESSAGE (OUTPATIENT)
Dept: INTERNAL MEDICINE | Facility: CLINIC | Age: 59
End: 2024-10-18
Payer: COMMERCIAL

## 2024-12-17 DIAGNOSIS — R51.9 NONINTRACTABLE EPISODIC HEADACHE, UNSPECIFIED HEADACHE TYPE: ICD-10-CM

## 2024-12-17 RX ORDER — SUMATRIPTAN SUCCINATE 50 MG/1
50 TABLET ORAL ONCE AS NEEDED
Qty: 9 TABLET | Refills: 1 | Status: SHIPPED | OUTPATIENT
Start: 2024-12-17 | End: 2024-12-17

## 2025-01-06 ENCOUNTER — HOSPITAL ENCOUNTER (EMERGENCY)
Facility: HOSPITAL | Age: 60
Discharge: HOME | End: 2025-01-06
Attending: EMERGENCY MEDICINE
Payer: COMMERCIAL

## 2025-01-06 VITALS
HEART RATE: 67 BPM | TEMPERATURE: 98 F | BODY MASS INDEX: 26.62 KG/M2 | OXYGEN SATURATION: 98 % | DIASTOLIC BLOOD PRESSURE: 78 MMHG | SYSTOLIC BLOOD PRESSURE: 198 MMHG | WEIGHT: 141 LBS | HEIGHT: 61 IN | RESPIRATION RATE: 11 BRPM

## 2025-01-06 DIAGNOSIS — G43.809 OTHER MIGRAINE WITHOUT STATUS MIGRAINOSUS, NOT INTRACTABLE: Primary | ICD-10-CM

## 2025-01-06 LAB
HCV AB SERPL QL IA: NEGATIVE
HIV 1+2 AB+HIV1 P24 AG SERPL QL IA: NEGATIVE

## 2025-01-06 PROCEDURE — 63600175 PHARM REV CODE 636 W HCPCS: Mod: ER | Performed by: EMERGENCY MEDICINE

## 2025-01-06 PROCEDURE — 96375 TX/PRO/DX INJ NEW DRUG ADDON: CPT | Mod: ER

## 2025-01-06 PROCEDURE — 87389 HIV-1 AG W/HIV-1&-2 AB AG IA: CPT | Performed by: EMERGENCY MEDICINE

## 2025-01-06 PROCEDURE — 99284 EMERGENCY DEPT VISIT MOD MDM: CPT | Mod: 25,ER

## 2025-01-06 PROCEDURE — 86803 HEPATITIS C AB TEST: CPT | Performed by: EMERGENCY MEDICINE

## 2025-01-06 PROCEDURE — 96374 THER/PROPH/DIAG INJ IV PUSH: CPT | Mod: ER

## 2025-01-06 RX ORDER — PROCHLORPERAZINE EDISYLATE 5 MG/ML
10 INJECTION INTRAMUSCULAR; INTRAVENOUS
Status: COMPLETED | OUTPATIENT
Start: 2025-01-06 | End: 2025-01-06

## 2025-01-06 RX ORDER — DIPHENHYDRAMINE HYDROCHLORIDE 50 MG/ML
25 INJECTION INTRAMUSCULAR; INTRAVENOUS
Status: COMPLETED | OUTPATIENT
Start: 2025-01-06 | End: 2025-01-06

## 2025-01-06 RX ORDER — MORPHINE SULFATE 4 MG/ML
6 INJECTION, SOLUTION INTRAMUSCULAR; INTRAVENOUS
Status: COMPLETED | OUTPATIENT
Start: 2025-01-06 | End: 2025-01-06

## 2025-01-06 RX ADMIN — MORPHINE SULFATE 6 MG: 4 INJECTION INTRAVENOUS at 11:01

## 2025-01-06 RX ADMIN — PROCHLORPERAZINE EDISYLATE 10 MG: 5 INJECTION INTRAMUSCULAR; INTRAVENOUS at 11:01

## 2025-01-06 RX ADMIN — DIPHENHYDRAMINE HYDROCHLORIDE 25 MG: 50 INJECTION INTRAMUSCULAR; INTRAVENOUS at 11:01

## 2025-01-06 NOTE — ED PROVIDER NOTES
Encounter Date: 2025       History     Chief Complaint   Patient presents with    Emesis     Started suddenly around 3:30 this morning. Denies any pain. Headache started Saturday.      She has a history of migraines and woke this morning with cephalgia and recurrent emesis, small volume vomiting, mild photophobia, typical for her migraines, unable to tolerate home medicines including blood pressure medicine.  Not driving.  No fever, chills, sweats, diarrhea, abdominal pain, ill contacts, chest pain, dyspnea, or other complaints.  Noted hypertensive and retching on arrival.    The history is provided by the patient. No  was used.     Review of patient's allergies indicates:   Allergen Reactions    Ibuprofen      Past Medical History:   Diagnosis Date    Arthritis     Depression     Hypertension     PERFECTO on CPAP      Past Surgical History:   Procedure Laterality Date    arthroscopic surgery Bilateral     knees     SECTION      CHOLECYSTECTOMY      EYE SURGERY  2017    gastric sleeve      REPAIR OF COLLATERAL LIGAMENT OF THUMB       Family History   Problem Relation Name Age of Onset    Hypertension Mother      Diabetes Father      Hypertension Father      Heart disease Father       Social History     Tobacco Use    Smoking status: Never    Smokeless tobacco: Never   Substance Use Topics    Alcohol use: Yes     Comment: rarely     Drug use: No     Review of Systems   Eyes:  Positive for photophobia.   Gastrointestinal:  Positive for nausea and vomiting.   Neurological:  Positive for headaches.       Physical Exam     Initial Vitals   BP Pulse Resp Temp SpO2   25 1047 25 1047 25 1047 25 1054 25 1047   (!) 227/111 94 20 97.5 °F (36.4 °C) 98 %      MAP       --                Physical Exam    Nursing note and vitals reviewed.  Constitutional: She appears well-developed and well-nourished. She appears distressed.   Hypertensive, retching, moderate  photophobia, typical migraineur appearance.   HENT:   Head: Normocephalic and atraumatic.   Eyes: EOM are normal. Pupils are equal, round, and reactive to light.   Neck: Neck supple.   Normal range of motion.  Cardiovascular:  Normal rate and regular rhythm.           Pulmonary/Chest: Breath sounds normal. No respiratory distress.   Abdominal: Abdomen is soft. Bowel sounds are normal.   Musculoskeletal:         General: No tenderness. Normal range of motion.      Cervical back: Normal range of motion and neck supple.     Neurological: She is alert and oriented to person, place, and time. She has normal strength.   Skin: Skin is warm and dry.         ED Course   Procedures  Labs Reviewed   HEPATITIS C ANTIBODY   HEP C VIRUS HOLD SPECIMEN   HIV 1 / 2 ANTIBODY          Imaging Results    None          Medications   prochlorperazine injection Soln 10 mg (10 mg Intravenous Given 1/6/25 1121)   diphenhydrAMINE injection 25 mg (25 mg Intravenous Given 1/6/25 1128)   morphine injection 6 mg (6 mg Intravenous Given 1/6/25 1125)         12:10 PM Complete relief of symptoms.  Blood pressure improving, ready to go home and take her home blood pressure medicines and take a nap.  Exam is stable.    Discharge instructions:    Continue usual medications including taking your usual blood pressure medicine when you get home today.      Return as needed.        Medical Decision Making  Patient with history of migraines and hypertension presents with acute migraine an elevated blood pressure in the setting of not being able to take her blood pressure medicines at home.  Excellent relief with simple medications, appropriate for continued routine home management.    Problems Addressed:  Other migraine without status migrainosus, not intractable: acute illness or injury    Risk  Prescription drug management.  Parenteral controlled substances.      Additional MDM:   Differential Diagnosis:   Hypertension, poorly controlled hypertension,  migraine, viral gastritis among others.                                    Clinical Impression:  Final diagnoses:  [G43.809] Other migraine without status migrainosus, not intractable (Primary)          ED Disposition Condition    Discharge Stable          ED Prescriptions    None       Follow-up Information       Follow up With Specialties Details Why Contact Info    [facility] - Emergency Dept Emergency Medicine  As needed [address] [facility]  Emergency Department  [facility] [id number] [phone]    Maryanne Beltran NP Family Medicine  As needed [address] [address]  [phone]               Ambrosio Chacko MD  01/06/25 1212

## 2025-01-06 NOTE — DISCHARGE INSTRUCTIONS
Continue usual medications including taking your usual blood pressure medicine when you get home today.      Return as needed.

## 2025-01-08 ENCOUNTER — PATIENT MESSAGE (OUTPATIENT)
Dept: INTERNAL MEDICINE | Facility: CLINIC | Age: 60
End: 2025-01-08
Payer: COMMERCIAL

## 2025-01-09 ENCOUNTER — OFFICE VISIT (OUTPATIENT)
Dept: INTERNAL MEDICINE | Facility: CLINIC | Age: 60
End: 2025-01-09
Payer: COMMERCIAL

## 2025-01-09 VITALS
RESPIRATION RATE: 20 BRPM | OXYGEN SATURATION: 100 % | SYSTOLIC BLOOD PRESSURE: 160 MMHG | DIASTOLIC BLOOD PRESSURE: 86 MMHG | WEIGHT: 142.19 LBS | BODY MASS INDEX: 26.85 KG/M2 | TEMPERATURE: 97 F | HEART RATE: 90 BPM | HEIGHT: 61 IN

## 2025-01-09 DIAGNOSIS — E66.3 OVERWEIGHT WITH BODY MASS INDEX (BMI) OF 26 TO 26.9 IN ADULT: ICD-10-CM

## 2025-01-09 DIAGNOSIS — F41.9 ANXIETY: ICD-10-CM

## 2025-01-09 DIAGNOSIS — G43.909 MIGRAINE WITHOUT STATUS MIGRAINOSUS, NOT INTRACTABLE, UNSPECIFIED MIGRAINE TYPE: ICD-10-CM

## 2025-01-09 DIAGNOSIS — I10 ESSENTIAL HYPERTENSION: Primary | ICD-10-CM

## 2025-01-09 DIAGNOSIS — R51.9 NONINTRACTABLE EPISODIC HEADACHE, UNSPECIFIED HEADACHE TYPE: ICD-10-CM

## 2025-01-09 DIAGNOSIS — F33.0 MILD EPISODE OF RECURRENT MAJOR DEPRESSIVE DISORDER: ICD-10-CM

## 2025-01-09 LAB — HEP C VIRUS HOLD SPECIMEN: NORMAL

## 2025-01-09 PROCEDURE — 3079F DIAST BP 80-89 MM HG: CPT | Mod: CPTII,S$GLB,, | Performed by: NURSE PRACTITIONER

## 2025-01-09 PROCEDURE — 4010F ACE/ARB THERAPY RXD/TAKEN: CPT | Mod: CPTII,S$GLB,, | Performed by: NURSE PRACTITIONER

## 2025-01-09 PROCEDURE — 1160F RVW MEDS BY RX/DR IN RCRD: CPT | Mod: CPTII,S$GLB,, | Performed by: NURSE PRACTITIONER

## 2025-01-09 PROCEDURE — 3008F BODY MASS INDEX DOCD: CPT | Mod: CPTII,S$GLB,, | Performed by: NURSE PRACTITIONER

## 2025-01-09 PROCEDURE — 1159F MED LIST DOCD IN RCRD: CPT | Mod: CPTII,S$GLB,, | Performed by: NURSE PRACTITIONER

## 2025-01-09 PROCEDURE — 99999 PR PBB SHADOW E&M-EST. PATIENT-LVL IV: CPT | Mod: PBBFAC,,, | Performed by: NURSE PRACTITIONER

## 2025-01-09 PROCEDURE — 3077F SYST BP >= 140 MM HG: CPT | Mod: CPTII,S$GLB,, | Performed by: NURSE PRACTITIONER

## 2025-01-09 PROCEDURE — G2211 COMPLEX E/M VISIT ADD ON: HCPCS | Mod: S$GLB,,, | Performed by: NURSE PRACTITIONER

## 2025-01-09 PROCEDURE — 99214 OFFICE O/P EST MOD 30 MIN: CPT | Mod: S$GLB,,, | Performed by: NURSE PRACTITIONER

## 2025-01-09 RX ORDER — ONDANSETRON 4 MG/1
4 TABLET, ORALLY DISINTEGRATING ORAL EVERY 8 HOURS PRN
Qty: 10 TABLET | Refills: 1 | Status: SHIPPED | OUTPATIENT
Start: 2025-01-09

## 2025-01-09 RX ORDER — SUMATRIPTAN SUCCINATE 50 MG/1
50 TABLET ORAL ONCE AS NEEDED
Qty: 9 TABLET | Refills: 1 | Status: SHIPPED | OUTPATIENT
Start: 2025-01-09 | End: 2025-01-09

## 2025-01-09 RX ORDER — VERAPAMIL HYDROCHLORIDE 360 MG/1
360 CAPSULE, DELAYED RELEASE PELLETS ORAL DAILY
Qty: 90 CAPSULE | Refills: 3 | Status: SHIPPED | OUTPATIENT
Start: 2025-01-09

## 2025-01-09 RX ORDER — ESTRADIOL 0.1 MG/D
1 PATCH, EXTENDED RELEASE TRANSDERMAL
COMMUNITY
Start: 2024-12-18

## 2025-01-09 RX ORDER — ONDANSETRON 4 MG/1
4 TABLET, ORALLY DISINTEGRATING ORAL EVERY 8 HOURS PRN
Qty: 10 TABLET | Refills: 1 | Status: SHIPPED | OUTPATIENT
Start: 2025-01-09 | End: 2025-01-09 | Stop reason: SDUPTHER

## 2025-01-09 RX ORDER — BUPROPION HYDROCHLORIDE 150 MG/1
150 TABLET ORAL DAILY
Qty: 30 TABLET | Refills: 0 | Status: SHIPPED | OUTPATIENT
Start: 2025-01-09 | End: 2025-01-25

## 2025-01-09 RX ORDER — PROGESTERONE 100 MG/1
100 CAPSULE ORAL NIGHTLY
COMMUNITY
Start: 2024-12-06

## 2025-01-09 RX ORDER — DULOXETIN HYDROCHLORIDE 30 MG/1
30 CAPSULE, DELAYED RELEASE ORAL DAILY
Qty: 30 CAPSULE | Refills: 0 | Status: SHIPPED | OUTPATIENT
Start: 2025-01-09

## 2025-01-09 RX ORDER — LOSARTAN POTASSIUM 50 MG/1
50 TABLET ORAL DAILY
Qty: 90 TABLET | Refills: 3 | Status: SHIPPED | OUTPATIENT
Start: 2025-01-09 | End: 2025-01-25 | Stop reason: DRUGHIGH

## 2025-01-09 NOTE — PROGRESS NOTES
Subjective:       Patient ID: Janette Lee is a 59 y.o. female.    Chief Complaint: Medication Refill      History of Present Illness    CHIEF COMPLAINT:  - Ms. Lee presents for a follow-up visit after an emergency room visit for severe migraine and vomiting, and to discuss medication management for depression and anxiety.    HPI:  Ms. Lee visited the emergency room on Monday due to severe vomiting associated with a migraine. She notes improvement in migraines since starting hormone medications prescribed by her gynecologist, including a patch and progesterone, though these do not alleviate hot flashes.    Ms. Lee attempted to take Sumatriptan (Imitrex) for her migraine but was unable to retain it due to vomiting. She found  sublingual medication at home, which provided relief after two doses.    She discusses ongoing issues with depression and anxiety, for which she has been taking Cymbalta (duloxetine) since her father's passing. She expresses a preference for addressing depression over anxiety, stating that her anxiety is less severe than her depression.    Ms. Lee reports a persistent dull headache, which may be related to elevated blood pressure.    She mentions an upcoming appointment with Dr. Salas at the end of the month, expressing concern about potential weight gain interfering with her weight loss goals. She also plans to schedule knee surgery in March and will need medical clearance for the procedure.           Patient Active Problem List   Diagnosis    Nonintractable headache    Essential hypertension    Adenoma of left adrenal gland    Varicose veins of both legs with edema    Onychomycosis    PERFECTO on CPAP    Anxiety    Hyperlipidemia LDL goal <130    Bilateral leg cramps    Overweight with body mass index (BMI) of 26 to 26.9 in adult    Chronic bilateral low back pain without sciatica    Prediabetes    Lymphadenopathy    S/P laparoscopic sleeve gastrectomy    GERD  (gastroesophageal reflux disease)         Past Surgical History:   Procedure Laterality Date    arthroscopic surgery Bilateral     knees     SECTION      CHOLECYSTECTOMY      EYE SURGERY  2017    gastric sleeve      REPAIR OF COLLATERAL LIGAMENT OF THUMB           Current Outpatient Medications:     ALPRAZolam (XANAX) 0.5 MG tablet, Take 1 tablet (0.5 mg total) by mouth 2 (two) times daily as needed for Anxiety., Disp: 10 tablet, Rfl: 0    butalbital-acetaminophen-caffeine -40 mg (FIORICET, ESGIC) -40 mg per tablet, Take 1 tablet by mouth every 6 (six) hours as needed for Headaches., Disp: 30 tablet, Rfl: 1    THEO 0.1 mg/24 hr PTSW, Place 1 patch onto the skin twice a week., Disp: , Rfl:     DULoxetine (CYMBALTA) 60 MG capsule, Take 1 capsule (60 mg total) by mouth once daily., Disp: 90 capsule, Rfl: 3    multivitamin (ONE DAILY MULTIVITAMIN) per tablet, Take 1 tablet by mouth once daily., Disp: , Rfl:     potassium chloride SA (KLOR-CON M20) 20 MEQ tablet, Take 1 tablet (20 mEq total) by mouth once daily., Disp: 90 tablet, Rfl: 3    progesterone (PROMETRIUM) 100 MG capsule, Take 100 mg by mouth every evening., Disp: , Rfl:     buPROPion (WELLBUTRIN XL) 150 MG TB24 tablet, Take 1 tablet (150 mg total) by mouth once daily., Disp: 30 tablet, Rfl: 0    calcium carbonate (OS-MARLENE) 600 mg calcium (1,500 mg) Tab, Take 600 mg by mouth once., Disp: , Rfl:     DULoxetine (CYMBALTA) 30 MG capsule, Take 1 capsule (30 mg total) by mouth once daily., Disp: 30 capsule, Rfl: 0    losartan (COZAAR) 50 MG tablet, Take 1 tablet (50 mg total) by mouth once daily., Disp: 90 tablet, Rfl: 3    ondansetron (ZOFRAN-ODT) 4 MG TbDL, Take 1 tablet (4 mg total) by mouth every 8 (eight) hours as needed., Disp: 10 tablet, Rfl: 1    sumatriptan (IMITREX) 50 MG tablet, Take 1 tablet (50 mg total) by mouth once as needed for Migraine (may repeat in 2 hours if no improvement of headache. do not excees 2 doses in 24  "hours.)., Disp: 9 tablet, Rfl: 1    verapamiL (VERELAN) 360 MG C24P, Take 1 capsule (360 mg total) by mouth once daily., Disp: 90 capsule, Rfl: 3    Review of Systems   Constitutional:  Positive for unexpected weight change. Negative for activity change.   HENT:  Negative for hearing loss, rhinorrhea and trouble swallowing.    Eyes:  Negative for discharge and visual disturbance.   Respiratory:  Negative for chest tightness and wheezing.    Cardiovascular:  Negative for chest pain and palpitations.   Gastrointestinal:  Positive for vomiting. Negative for blood in stool, constipation and diarrhea.   Endocrine: Negative for polydipsia and polyuria.   Genitourinary:  Negative for difficulty urinating, dysuria, hematuria and menstrual problem.   Musculoskeletal:  Negative for arthralgias, joint swelling and neck pain.   Neurological:  Positive for headaches. Negative for dizziness, weakness and light-headedness.   Psychiatric/Behavioral:  Negative for confusion and dysphoric mood.        Objective:   BP (!) 160/86 (BP Location: Left arm, Patient Position: Sitting)   Pulse 90   Temp 97 °F (36.1 °C) (Tympanic)   Resp 20   Ht 5' 1" (1.549 m)   Wt 64.5 kg (142 lb 3.2 oz)   SpO2 100%   BMI 26.87 kg/m²      Physical Exam  Vitals reviewed.   Constitutional:       General: She is not in acute distress.     Appearance: Normal appearance. She is well-developed. She is not ill-appearing or diaphoretic.   HENT:      Head: Normocephalic.   Cardiovascular:      Rate and Rhythm: Normal rate.      Heart sounds: No murmur heard.  Pulmonary:      Effort: Pulmonary effort is normal. No respiratory distress.   Abdominal:      Palpations: Abdomen is soft.      Tenderness: There is no abdominal tenderness.   Skin:     General: Skin is warm and dry.      Coloration: Skin is not pale.      Findings: No erythema.   Neurological:      Mental Status: She is alert and oriented to person, place, and time.      GCS: GCS eye subscore is 4. GCS " verbal subscore is 5. GCS motor subscore is 6.      Cranial Nerves: No dysarthria or facial asymmetry.      Motor: No weakness, tremor or pronator drift.      Gait: Gait is intact.   Psychiatric:         Mood and Affect: Mood normal.         Behavior: Behavior normal.         Assessment & Plan     Problem List Items Addressed This Visit          Neuro    Nonintractable headache       Psychiatric    Anxiety    Relevant Medications    DULoxetine (CYMBALTA) 30 MG capsule       Cardiac/Vascular    Essential hypertension - Primary    Current Assessment & Plan     Blood pressure elevated. Monitor at home, and follow up in two weeks.          Relevant Medications    verapamiL (VERELAN) 360 MG C24P    losartan (COZAAR) 50 MG tablet       Endocrine    Overweight with body mass index (BMI) of 26 to 26.9 in adult    Current Assessment & Plan     Counseled on importance of diet and exercise in order to improve overall quality of life, and reduce risk of future comorbidities.            Other Visit Diagnoses       Migraine without status migrainosus, not intractable, unspecified migraine type        Relevant Medications    ondansetron (ZOFRAN-ODT) 4 MG TbDL    Mild episode of recurrent major depressive disorder        Relevant Medications    buPROPion (WELLBUTRIN XL) 150 MG TB24 tablet            Assessment & Plan    MEDICAL DECISION MAKING:  - Considered impact of hormone therapy on blood pressure, noting potential side effects of Prometrium and estrogen  - Evaluated options for managing depression and anxiety, weighing pros and cons of Wellbutrin vs. Venlafaxine as alternatives to Cymbalta  - Assessed need for blood pressure medication adjustment based on recent elevated readings, potentially related to hormone therapy  - Planned gradual tapering of Cymbalta before potential switch to new antidepressant    PATIENT EDUCATION:  - Discussed potential side effects of hormone therapy, including impact on blood pressure  - Explained  "differences between antidepressant medications, including Wellbutrin's potential appetite suppressing effects  - Educated on importance of muscle-building exercises for long-term health and fall prevention    ACTION ITEMS/LIFESTYLE:  - Ms. Lee to monitor blood pressure at home for 1 week  - Recommend beginning strength training exercises to build muscle mass and reduce fall risk    MEDICATIONS:  - Started Wellbutrin 150 mg daily  - Continued Losartan; increase to 100 mg daily if blood pressure remains elevated after 1 week of monitoring  - Started tapering Cymbalta: alternate 60 mg and 30 mg daily for 1-2 weeks, then decrease to 30 mg daily  - Continued Sumatriptan for migraines  - Continued Verapamil    FOLLOW UP:  - Follow up in 2 weeks for nurse visit to check blood pressure and assess response to Wellbutrin  - Follow up for pre-operative clearance and lab work prior to planned March knee surgery          Follow up in about 2 weeks (around 1/23/2025) for depression/hypertension.  Visit today included increased complexity associated with the care of the episodic problem  addressed and managing the longitudinal care of the patient due to the serious and/or complex managed problem(s) .          Portions of this note may have been created with voice recognition software. Occasional "wrong-word" or "sound-a-like" substitutions may have occurred due to the inherent limitations of voice recognition software. Please, read the note carefully and recognize, using context, where substitutions have occurred.       This note was generated with the assistance of ambient listening technology. Verbal consent was obtained by the patient and accompanying visitor(s) for the recording of patient appointment to facilitate this note. I attest to having reviewed and edited the generated note for accuracy, though some syntax or spelling errors may persist. Please contact the author of this note for any clarification.       "

## 2025-01-22 ENCOUNTER — TELEPHONE (OUTPATIENT)
Dept: INTERNAL MEDICINE | Facility: CLINIC | Age: 60
End: 2025-01-22
Payer: COMMERCIAL

## 2025-01-22 NOTE — TELEPHONE ENCOUNTER
----- Message from [employer] sent at 1/22/2025  8:08 AM CST -----      Who Called:  Janette     Would the patient rather a call back or a response via [facility]? Call back   Best Call Back Number:  [phone] (home)    Additional Information: Pt is calling in regard to the appt scheduled on 01/23 and would like to discuss changing the appt to virtual due to the uncertainty of the road conditions for tomorrow

## 2025-01-25 ENCOUNTER — OFFICE VISIT (OUTPATIENT)
Dept: INTERNAL MEDICINE | Facility: CLINIC | Age: 60
End: 2025-01-25
Payer: COMMERCIAL

## 2025-01-25 VITALS — DIASTOLIC BLOOD PRESSURE: 97 MMHG | SYSTOLIC BLOOD PRESSURE: 167 MMHG

## 2025-01-25 DIAGNOSIS — F41.9 ANXIETY: ICD-10-CM

## 2025-01-25 DIAGNOSIS — F33.0 MILD EPISODE OF RECURRENT MAJOR DEPRESSIVE DISORDER: ICD-10-CM

## 2025-01-25 DIAGNOSIS — I10 ESSENTIAL HYPERTENSION: Primary | ICD-10-CM

## 2025-01-25 RX ORDER — LOSARTAN POTASSIUM 100 MG/1
100 TABLET ORAL DAILY
Qty: 30 TABLET | Refills: 0 | Status: SHIPPED | OUTPATIENT
Start: 2025-01-25

## 2025-01-25 RX ORDER — BUPROPION HYDROCHLORIDE 150 MG/1
150 TABLET ORAL DAILY
Qty: 90 TABLET | Refills: 1 | Status: SHIPPED | OUTPATIENT
Start: 2025-01-25

## 2025-01-25 NOTE — ASSESSMENT & PLAN NOTE
Improvement with wellbutrin. Continue current dose. Continues to titrate down on cymbalta. Currently taking 30 mg.

## 2025-01-25 NOTE — PROGRESS NOTES
Subjective:       Patient ID: Janette Lee is a 59 y.o. female.    Chief Complaint: Hypertension    The patient location is: [place]  The chief complaint leading to consultation is: depression/htn    Visit type: audiovisual    Face to Face time with patient: 10 minutes  15 minutes of total time spent on the encounter, which includes face to face time and non-face to face time preparing to see the patient (eg, review of tests), Obtaining and/or reviewing separately obtained history, Documenting clinical information in the electronic or other health record, Independently interpreting results (not separately reported) and communicating results to the patient/family/caregiver, or Care coordination (not separately reported).         Each patient to whom he or she provides medical services by telemedicine is:  (1) informed of the relationship between the physician and patient and the respective role of any other health care provider with respect to management of the patient; and (2) notified that he or she may decline to receive medical services by telemedicine and may withdraw from such care at any time.    Notes:   Mrs. Lee presents to visit for two week follow up for HTN/depression. Started on wellbutrin at last visit, and started titrating down on cymbalta. Tolerating medications well without any negative side effects. Feels that medication is working well and stable on current dose.     BP continues to be elevated. Slight headache this morning. Lowest BP at home that she had was 154/83. Did try to take two losartan on a few days.         Hypertension  This is a chronic problem. The current episode started more than 1 year ago. The problem has been gradually worsening since onset. The problem is uncontrolled. Associated symptoms include headaches. Pertinent negatives include no anxiety, blurred vision, chest pain, malaise/fatigue, neck pain, palpitations, peripheral edema or shortness of breath. There are  no associated agents to hypertension. Past treatments include calcium channel blockers, angiotensin blockers, diuretics and lifestyle changes. There is no history of angina, kidney disease, CAD/MI, CVA, heart failure, left ventricular hypertrophy, PVD or retinopathy.       Patient Active Problem List   Diagnosis    Nonintractable headache    Essential hypertension    Adenoma of left adrenal gland    Varicose veins of both legs with edema    Onychomycosis    PERFECTO on CPAP    Anxiety    Hyperlipidemia LDL goal <130    Bilateral leg cramps    Overweight with body mass index (BMI) of 26 to 26.9 in adult    Chronic bilateral low back pain without sciatica    Prediabetes    Lymphadenopathy    S/P laparoscopic sleeve gastrectomy    GERD (gastroesophageal reflux disease)    Mild episode of recurrent major depressive disorder       Past Surgical History:   Procedure Laterality Date    arthroscopic surgery Bilateral     knees     SECTION      CHOLECYSTECTOMY      EYE SURGERY  2017    gastric sleeve      REPAIR OF COLLATERAL LIGAMENT OF THUMB           Current Outpatient Medications:     ALPRAZolam (XANAX) 0.5 MG tablet, Take 1 tablet (0.5 mg total) by mouth 2 (two) times daily as needed for Anxiety., Disp: 10 tablet, Rfl: 0    butalbital-acetaminophen-caffeine -40 mg (FIORICET, ESGIC) -40 mg per tablet, Take 1 tablet by mouth every 6 (six) hours as needed for Headaches., Disp: 30 tablet, Rfl: 1    DULoxetine (CYMBALTA) 30 MG capsule, Take 1 capsule (30 mg total) by mouth once daily., Disp: 30 capsule, Rfl: 0    multivitamin (ONE DAILY MULTIVITAMIN) per tablet, Take 1 tablet by mouth once daily., Disp: , Rfl:     ondansetron (ZOFRAN-ODT) 4 MG TbDL, Take 1 tablet (4 mg total) by mouth every 8 (eight) hours as needed., Disp: 10 tablet, Rfl: 1    potassium chloride SA (KLOR-CON M20) 20 MEQ tablet, Take 1 tablet (20 mEq total) by mouth once daily., Disp: 90 tablet, Rfl: 3    verapamiL (VERELAN) 360 MG C24P,  Take 1 capsule (360 mg total) by mouth once daily., Disp: 90 capsule, Rfl: 3    buPROPion (WELLBUTRIN XL) 150 MG TB24 tablet, Take 1 tablet (150 mg total) by mouth once daily., Disp: 90 tablet, Rfl: 1    THEO 0.1 mg/24 hr PTSW, Place 1 patch onto the skin twice a week. (Patient not taking: Reported on 1/25/2025), Disp: , Rfl:     losartan (COZAAR) 100 MG tablet, Take 1 tablet (100 mg total) by mouth once daily., Disp: 30 tablet, Rfl: 0    progesterone (PROMETRIUM) 100 MG capsule, Take 100 mg by mouth every evening. (Patient not taking: Reported on 1/25/2025), Disp: , Rfl:     sumatriptan (IMITREX) 50 MG tablet, Take 1 tablet (50 mg total) by mouth once as needed for Migraine (may repeat in 2 hours if no improvement of headache. do not excees 2 doses in 24 hours.)., Disp: 9 tablet, Rfl: 1    Review of Systems   Constitutional:  Negative for activity change, fatigue, fever, malaise/fatigue and unexpected weight change.   HENT:  Negative for hearing loss, rhinorrhea and trouble swallowing.    Eyes:  Negative for blurred vision, discharge and visual disturbance.   Respiratory:  Negative for chest tightness, shortness of breath and wheezing.    Cardiovascular:  Negative for chest pain, palpitations and leg swelling.   Gastrointestinal:  Negative for blood in stool, constipation, diarrhea and vomiting.   Endocrine: Negative for polydipsia and polyuria.   Genitourinary:  Negative for difficulty urinating, dysuria, hematuria and menstrual problem.   Musculoskeletal:  Negative for arthralgias, joint swelling and neck pain.   Neurological:  Positive for headaches. Negative for dizziness, weakness and light-headedness.   Psychiatric/Behavioral:  Negative for confusion and dysphoric mood. The patient is not nervous/anxious.        Objective:   BP (!) 167/97      Physical Exam  Constitutional:       General: She is not in acute distress.     Appearance: Normal appearance. She is not ill-appearing.   Pulmonary:      Effort:  "Pulmonary effort is normal. No respiratory distress.   Neurological:      General: No focal deficit present.      Mental Status: She is alert and oriented to person, place, and time.   Psychiatric:         Mood and Affect: Mood normal.         Assessment & Plan     Problem List Items Addressed This Visit          Psychiatric    Anxiety    Current Assessment & Plan     Improvement with wellbutrin. Continue current dose. Continues to titrate down on cymbalta. Currently taking 30 mg.          Mild episode of recurrent major depressive disorder    Current Assessment & Plan     Improvement with wellbutrin. Refill sent to pharmacy.         Relevant Medications    buPROPion (WELLBUTRIN XL) 150 MG TB24 tablet       Cardiac/Vascular    Essential hypertension - Primary    Current Assessment & Plan     Blood pressure still elevated. Increasing losartan to 100 mg.          Relevant Medications    losartan (COZAAR) 100 MG tablet     Follow up in about 2 weeks (around 2/8/2025), or if symptoms worsen or fail to improve, for hypertension e visit.            Portions of this note may have been created with voice recognition software. Occasional "wrong-word" or "sound-a-like" substitutions may have occurred due to the inherent limitations of voice recognition software. Please, read the note carefully and recognize, using context, where substitutions have occurred.       "

## 2025-02-07 ENCOUNTER — PATIENT MESSAGE (OUTPATIENT)
Dept: INTERNAL MEDICINE | Facility: CLINIC | Age: 60
End: 2025-02-07
Payer: COMMERCIAL

## 2025-02-13 DIAGNOSIS — I10 ESSENTIAL HYPERTENSION: ICD-10-CM

## 2025-02-13 DIAGNOSIS — F41.9 ANXIETY: ICD-10-CM

## 2025-02-14 RX ORDER — VERAPAMIL HYDROCHLORIDE 360 MG/1
360 CAPSULE, DELAYED RELEASE ORAL
Qty: 90 CAPSULE | Refills: 3 | Status: SHIPPED | OUTPATIENT
Start: 2025-02-14

## 2025-02-14 RX ORDER — DULOXETIN HYDROCHLORIDE 60 MG/1
60 CAPSULE, DELAYED RELEASE ORAL
Qty: 90 CAPSULE | Refills: 3 | OUTPATIENT
Start: 2025-02-14

## 2025-02-14 RX ORDER — LOSARTAN POTASSIUM 50 MG/1
50 TABLET ORAL
Qty: 90 TABLET | Refills: 3 | OUTPATIENT
Start: 2025-02-14

## 2025-02-14 NOTE — TELEPHONE ENCOUNTER
The original prescription was discontinued on 1/25/2025 by Maryanne Beltran NP for the following reason: Patient no longer taking. Renewing this prescription may not be appropriate.

## 2025-03-12 DIAGNOSIS — I10 ESSENTIAL HYPERTENSION: ICD-10-CM

## 2025-03-12 RX ORDER — LOSARTAN POTASSIUM 100 MG/1
100 TABLET ORAL
Qty: 90 TABLET | Refills: 1 | Status: SHIPPED | OUTPATIENT
Start: 2025-03-12

## 2025-03-14 DIAGNOSIS — F43.21 GRIEF REACTION: ICD-10-CM

## 2025-03-14 RX ORDER — ALPRAZOLAM 0.5 MG/1
0.5 TABLET ORAL 2 TIMES DAILY PRN
Qty: 10 TABLET | Refills: 0 | Status: SHIPPED | OUTPATIENT
Start: 2025-03-14

## 2025-03-14 NOTE — TELEPHONE ENCOUNTER
Anxiety has been bad lately with work.  I had one pill left from the prescription you gave two years ago and it really helped.    Last visit 01/25/25

## 2025-03-27 ENCOUNTER — PATIENT MESSAGE (OUTPATIENT)
Dept: INTERNAL MEDICINE | Facility: CLINIC | Age: 60
End: 2025-03-27
Payer: COMMERCIAL

## 2025-03-28 ENCOUNTER — OFFICE VISIT (OUTPATIENT)
Dept: INTERNAL MEDICINE | Facility: CLINIC | Age: 60
End: 2025-03-28
Payer: COMMERCIAL

## 2025-03-28 VITALS
OXYGEN SATURATION: 97 % | BODY MASS INDEX: 26.73 KG/M2 | WEIGHT: 141.56 LBS | RESPIRATION RATE: 14 BRPM | HEART RATE: 96 BPM | DIASTOLIC BLOOD PRESSURE: 74 MMHG | HEIGHT: 61 IN | TEMPERATURE: 97 F | SYSTOLIC BLOOD PRESSURE: 158 MMHG

## 2025-03-28 DIAGNOSIS — E66.3 OVERWEIGHT WITH BODY MASS INDEX (BMI) OF 26 TO 26.9 IN ADULT: ICD-10-CM

## 2025-03-28 DIAGNOSIS — N30.00 ACUTE CYSTITIS WITHOUT HEMATURIA: Primary | ICD-10-CM

## 2025-03-28 DIAGNOSIS — R73.03 PREDIABETES: ICD-10-CM

## 2025-03-28 DIAGNOSIS — R35.0 URINARY FREQUENCY: ICD-10-CM

## 2025-03-28 DIAGNOSIS — E78.5 HYPERLIPIDEMIA LDL GOAL <130: ICD-10-CM

## 2025-03-28 DIAGNOSIS — I10 ESSENTIAL HYPERTENSION: ICD-10-CM

## 2025-03-28 LAB
BILIRUB SERPL-MCNC: NEGATIVE MG/DL
BLOOD, POC UA: NEGATIVE
GLUCOSE UR QL STRIP: NEGATIVE
KETONES UR QL STRIP: NEGATIVE
LEUKOCYTE ESTERASE URINE, POC: NORMAL
NITRITE, POC UA: NEGATIVE
PH, POC UA: 6.5
PROTEIN, POC: NORMAL
SPECIFIC GRAVITY, POC UA: 1.01
UROBILINOGEN, POC UA: 0.2

## 2025-03-28 PROCEDURE — 81003 URINALYSIS AUTO W/O SCOPE: CPT | Mod: QW,S$GLB,, | Performed by: NURSE PRACTITIONER

## 2025-03-28 PROCEDURE — 99214 OFFICE O/P EST MOD 30 MIN: CPT | Mod: S$GLB,,, | Performed by: NURSE PRACTITIONER

## 2025-03-28 PROCEDURE — 3077F SYST BP >= 140 MM HG: CPT | Mod: CPTII,S$GLB,, | Performed by: NURSE PRACTITIONER

## 2025-03-28 PROCEDURE — 3008F BODY MASS INDEX DOCD: CPT | Mod: CPTII,S$GLB,, | Performed by: NURSE PRACTITIONER

## 2025-03-28 PROCEDURE — G2211 COMPLEX E/M VISIT ADD ON: HCPCS | Mod: S$GLB,,, | Performed by: NURSE PRACTITIONER

## 2025-03-28 PROCEDURE — 99999 PR PBB SHADOW E&M-EST. PATIENT-LVL IV: CPT | Mod: PBBFAC,,, | Performed by: NURSE PRACTITIONER

## 2025-03-28 PROCEDURE — 1159F MED LIST DOCD IN RCRD: CPT | Mod: CPTII,S$GLB,, | Performed by: NURSE PRACTITIONER

## 2025-03-28 PROCEDURE — 1160F RVW MEDS BY RX/DR IN RCRD: CPT | Mod: CPTII,S$GLB,, | Performed by: NURSE PRACTITIONER

## 2025-03-28 PROCEDURE — 3078F DIAST BP <80 MM HG: CPT | Mod: CPTII,S$GLB,, | Performed by: NURSE PRACTITIONER

## 2025-03-28 PROCEDURE — 4010F ACE/ARB THERAPY RXD/TAKEN: CPT | Mod: CPTII,S$GLB,, | Performed by: NURSE PRACTITIONER

## 2025-03-28 RX ORDER — NITROFURANTOIN 25; 75 MG/1; MG/1
100 CAPSULE ORAL 2 TIMES DAILY
Qty: 14 CAPSULE | Refills: 0 | Status: SHIPPED | OUTPATIENT
Start: 2025-03-28 | End: 2025-04-04

## 2025-03-28 RX ORDER — ERGOCALCIFEROL 1.25 MG/1
50000 CAPSULE ORAL
COMMUNITY

## 2025-03-28 RX ORDER — TOPIRAMATE 25 MG/1
25 TABLET ORAL 2 TIMES DAILY
COMMUNITY
Start: 2025-02-11 | End: 2025-04-12

## 2025-03-28 RX ORDER — CHLORTHALIDONE 25 MG/1
25 TABLET ORAL DAILY
Qty: 30 TABLET | Refills: 0 | Status: SHIPPED | OUTPATIENT
Start: 2025-03-28

## 2025-03-28 NOTE — PROGRESS NOTES
Subjective:       Patient ID: Janette Lee is a 59 y.o. female.    Chief Complaint: Urinary Tract Infection      History of Present Illness    CHIEF COMPLAINT:  - Ms. Lee presents with symptoms of urinary tract infection, including burning sensation and urge to urinate.    HPI:  Ms. Lee reports burning sensation and urge to urinate for a few days, described as intermittent. Symptoms were particularly severe yesterday afternoon, prompting her to make an appointment, but subsided today. She notes pain in both sides of her lower back, initially thought to be kidney-related but now suspected to be associated with urinary symptoms. Ms. Lee reports drinking fluids throughout the day. There is a history of recurrent UTIs, with frequent occurrences over a 4-month period in the past, requiring treatment from a urologist. No consistent pattern to current symptoms is reported.    Ms. Lee denies shortness of breath and fever.    In regards to blood pressure.  Patient reports blood pressure has been consistently elevated at home as well.  Initially thought to be related to initiation of hormone supplements, but blood pressure continues to be elevated even with stopping hormones.    Answers submitted by the patient for this visit:  Painful Urination Questionnaire (Submitted on 3/27/2025)  Chief Complaint: Dysuria  Chronicity: new  Onset: yesterday  Frequency: intermittently  Progression since onset: gradually worsening  Pain quality: burning  Pain - numeric: 7/10  Fever: no fever  Sexually active?: No  History of pyelonephritis?: No  discharge: No  hesitancy: No  possible pregnancy: No  sweats: No  weight loss: No  withholding: No  behavior changes: No  Treatments tried: nothing, increased fluids  Improvement on treatment: mild  Pain severity: mild  catheterization: No  diabetes insipidus: No  diabetes mellitus: No  genitourinary reflux: No  hypertension: Yes  recurrent UTIs: Yes  single kidney: No  STD:  "Yes  urinary stasis: No  urological procedure: No  kidney stones: No  Problem List[1]      Past Surgical History:   Procedure Laterality Date    arthroscopic surgery Bilateral     knees     SECTION      CHOLECYSTECTOMY      EYE SURGERY  2017    gastric sleeve      REPAIR OF COLLATERAL LIGAMENT OF THUMB         Current Medications[2]    Review of Systems   Constitutional:  Negative for chills, fatigue and fever.   Respiratory:  Negative for cough and shortness of breath.    Cardiovascular:  Negative for chest pain and palpitations.   Gastrointestinal:  Negative for abdominal pain, constipation, nausea and vomiting.   Genitourinary:  Positive for dysuria, flank pain, frequency and urgency. Negative for hematuria.   Musculoskeletal:  Positive for back pain.   Skin:  Negative for rash.   Neurological:  Negative for dizziness, light-headedness and headaches.       Objective:   BP (!) 158/74   Pulse 96   Temp 96.8 °F (36 °C) (Oral)   Resp 14   Ht 5' 1" (1.549 m)   Wt 64.2 kg (141 lb 8.6 oz)   SpO2 97%   BMI 26.74 kg/m²      Physical Exam  Vitals reviewed.   Constitutional:       General: She is not in acute distress.     Appearance: Normal appearance. She is well-developed. She is not ill-appearing or diaphoretic.   HENT:      Head: Normocephalic.   Cardiovascular:      Rate and Rhythm: Normal rate and regular rhythm.      Pulses: Normal pulses.      Heart sounds: Normal heart sounds. No murmur heard.     No friction rub. No gallop.   Pulmonary:      Effort: Pulmonary effort is normal. No respiratory distress.      Breath sounds: Normal breath sounds. No rales.   Abdominal:      Palpations: Abdomen is soft.      Tenderness: There is no abdominal tenderness. There is no right CVA tenderness, left CVA tenderness or guarding.   Skin:     General: Skin is warm and dry.      Coloration: Skin is not pale.      Findings: No erythema.   Neurological:      Mental Status: She is alert and oriented to person, " place, and time.   Psychiatric:         Mood and Affect: Mood normal.         Behavior: Behavior normal.         Assessment & Plan     Problem List Items Addressed This Visit          Cardiac/Vascular    Essential hypertension    Current Assessment & Plan   Blood pressure elevated.  Adding back on chlorthalidone.  Follow-up in 2 weeks for nurse visit.         Relevant Medications    chlorthalidone (HYGROTEN) 25 MG Tab    Other Relevant Orders    Basic Metabolic Panel    Lipid Panel    Hyperlipidemia LDL goal <130    Current Assessment & Plan   Lipid panel ordered.  Lifestyle modifications.            Endocrine    Overweight with body mass index (BMI) of 26 to 26.9 in adult    Current Assessment & Plan   Counseled on importance of diet and exercise in order to improve overall quality of life, and reduce risk of future comorbidities.           Prediabetes    Current Assessment & Plan   Labs today.  Continue lifestyle modifications.         Relevant Orders    Hemoglobin A1C     Other Visit Diagnoses         Acute cystitis without hematuria    -  Primary    Relevant Medications    nitrofurantoin, macrocrystal-monohydrate, (MACROBID) 100 MG capsule      Urinary frequency        Relevant Orders    POCT URINALYSIS (Completed)            Assessment & Plan    MEDICAL DECISION MAKING:  - Assessed urinary symptoms, noting intermittent burning and urgency without fever or back pain, suggesting possible UTI.  - Reviewed recent labs, noting good kidney and liver function, slightly elevated cholesterol, and low vitamin D.  - Started chlorthalidone and continued lisinopril, amlodipine for BP management due to persistently elevated BP.  - Continued daily potassium supplement.    MEDICATIONS:  - Started chlorthalidone and continued lisinopril, amlodipine for BP management due to persistently elevated BP.  - Continued daily potassium supplement.    ORDERS:  - Ordered BMP, A1c test, and lipid panel in 2 weeks.          Follow up in  "about 2 weeks (around 4/11/2025) for hypertension nurse visit. .    Visit today included increased complexity associated with the care of the episodic problem  addressed and managing the longitudinal care of the patient due to the serious and/or complex managed problem(s) .        Portions of this note may have been created with voice recognition software. Occasional "wrong-word" or "sound-a-like" substitutions may have occurred due to the inherent limitations of voice recognition software. Please, read the note carefully and recognize, using context, where substitutions have occurred.       This note was generated with the assistance of ambient listening technology. Verbal consent was obtained by the patient and accompanying visitor(s) for the recording of patient appointment to facilitate this note. I attest to having reviewed and edited the generated note for accuracy, though some syntax or spelling errors may persist. Please contact the author of this note for any clarification.            [1]   Patient Active Problem List  Diagnosis    Nonintractable headache    Essential hypertension    Adenoma of left adrenal gland    Varicose veins of both legs with edema    Onychomycosis    PERFECTO on CPAP    Anxiety    Hyperlipidemia LDL goal <130    Bilateral leg cramps    Overweight with body mass index (BMI) of 26 to 26.9 in adult    Chronic bilateral low back pain without sciatica    Prediabetes    Lymphadenopathy    S/P laparoscopic sleeve gastrectomy    GERD (gastroesophageal reflux disease)    Mild episode of recurrent major depressive disorder   [2]   Current Outpatient Medications:     ALPRAZolam (XANAX) 0.5 MG tablet, Take 1 tablet (0.5 mg total) by mouth 2 (two) times daily as needed for Anxiety., Disp: 10 tablet, Rfl: 0    buPROPion (WELLBUTRIN XL) 150 MG TB24 tablet, Take 1 tablet (150 mg total) by mouth once daily., Disp: 90 tablet, Rfl: 1    butalbital-acetaminophen-caffeine -40 mg (FIORICET, ESGIC) " -40 mg per tablet, Take 1 tablet by mouth every 6 (six) hours as needed for Headaches., Disp: 30 tablet, Rfl: 1    losartan (COZAAR) 100 MG tablet, TAKE 1 TABLET BY MOUTH EVERY DAY, Disp: 90 tablet, Rfl: 1    multivitamin (ONE DAILY MULTIVITAMIN) per tablet, Take 1 tablet by mouth once daily., Disp: , Rfl:     ondansetron (ZOFRAN-ODT) 4 MG TbDL, Take 1 tablet (4 mg total) by mouth every 8 (eight) hours as needed., Disp: 10 tablet, Rfl: 1    potassium chloride SA (KLOR-CON M20) 20 MEQ tablet, Take 1 tablet (20 mEq total) by mouth once daily., Disp: 90 tablet, Rfl: 3    sumatriptan (IMITREX) 50 MG tablet, Take 1 tablet (50 mg total) by mouth once as needed for Migraine (may repeat in 2 hours if no improvement of headache. do not excees 2 doses in 24 hours.)., Disp: 9 tablet, Rfl: 1    topiramate (TOPAMAX) 25 MG tablet, Take 25 mg by mouth 2 (two) times daily., Disp: , Rfl:     verapamiL (VERELAN) 360 MG C24P, TAKE 1 CAPSULE DAILY, Disp: 90 capsule, Rfl: 3    chlorthalidone (HYGROTEN) 25 MG Tab, Take 1 tablet (25 mg total) by mouth once daily., Disp: 30 tablet, Rfl: 0    nitrofurantoin, macrocrystal-monohydrate, (MACROBID) 100 MG capsule, Take 1 capsule (100 mg total) by mouth 2 (two) times daily. for 7 days, Disp: 14 capsule, Rfl: 0    VITAMIN D2 1,250 mcg (50,000 unit) capsule, Take 50,000 Units by mouth every 7 days., Disp: , Rfl:

## 2025-04-11 ENCOUNTER — CLINICAL SUPPORT (OUTPATIENT)
Dept: INTERNAL MEDICINE | Facility: CLINIC | Age: 60
End: 2025-04-11
Payer: COMMERCIAL

## 2025-04-11 ENCOUNTER — LAB VISIT (OUTPATIENT)
Dept: LAB | Facility: HOSPITAL | Age: 60
End: 2025-04-11
Attending: NURSE PRACTITIONER
Payer: COMMERCIAL

## 2025-04-11 VITALS — SYSTOLIC BLOOD PRESSURE: 134 MMHG | DIASTOLIC BLOOD PRESSURE: 70 MMHG

## 2025-04-11 DIAGNOSIS — R73.03 PREDIABETES: ICD-10-CM

## 2025-04-11 DIAGNOSIS — I10 ESSENTIAL HYPERTENSION: ICD-10-CM

## 2025-04-11 DIAGNOSIS — R03.0 ELEVATED BLOOD PRESSURE READING WITHOUT DIAGNOSIS OF HYPERTENSION: Primary | ICD-10-CM

## 2025-04-11 LAB
ANION GAP (OHS): 11 MMOL/L (ref 8–16)
BUN SERPL-MCNC: 22 MG/DL (ref 8–23)
CALCIUM SERPL-MCNC: 9.6 MG/DL (ref 8.7–10.5)
CHLORIDE SERPL-SCNC: 97 MMOL/L (ref 95–110)
CO2 SERPL-SCNC: 32 MMOL/L (ref 23–29)
CREAT SERPL-MCNC: 0.8 MG/DL (ref 0.5–1.4)
GFR SERPLBLD CREATININE-BSD FMLA CKD-EPI: >60 ML/MIN/1.73/M2
GLUCOSE SERPL-MCNC: 114 MG/DL (ref 70–110)
POTASSIUM SERPL-SCNC: 3.3 MMOL/L (ref 3.5–5.1)
SODIUM SERPL-SCNC: 140 MMOL/L (ref 136–145)

## 2025-04-11 PROCEDURE — 80061 LIPID PANEL: CPT

## 2025-04-11 PROCEDURE — 99999 PR PBB SHADOW E&M-EST. PATIENT-LVL I: CPT | Mod: PBBFAC,,,

## 2025-04-11 PROCEDURE — 83036 HEMOGLOBIN GLYCOSYLATED A1C: CPT

## 2025-04-11 PROCEDURE — 80048 BASIC METABOLIC PNL TOTAL CA: CPT

## 2025-04-11 PROCEDURE — 36415 COLL VENOUS BLD VENIPUNCTURE: CPT | Mod: PO

## 2025-04-11 RX ORDER — VERAPAMIL HYDROCHLORIDE 360 MG/1
360 CAPSULE, DELAYED RELEASE ORAL DAILY
Qty: 90 CAPSULE | Refills: 3 | Status: SHIPPED | OUTPATIENT
Start: 2025-04-11

## 2025-04-12 ENCOUNTER — RESULTS FOLLOW-UP (OUTPATIENT)
Dept: INTERNAL MEDICINE | Facility: CLINIC | Age: 60
End: 2025-04-12

## 2025-04-12 DIAGNOSIS — E87.6 HYPOKALEMIA: ICD-10-CM

## 2025-04-12 LAB
CHOLEST SERPL-MCNC: 200 MG/DL (ref 120–199)
CHOLEST/HDLC SERPL: 2.3 {RATIO} (ref 2–5)
EAG (OHS): 103 MG/DL (ref 68–131)
HBA1C MFR BLD: 5.2 % (ref 4–5.6)
HDLC SERPL-MCNC: 88 MG/DL (ref 40–75)
HDLC SERPL: 44 % (ref 20–50)
LDLC SERPL CALC-MCNC: 104 MG/DL (ref 63–159)
NONHDLC SERPL-MCNC: 112 MG/DL
TRIGL SERPL-MCNC: 40 MG/DL (ref 30–150)

## 2025-04-12 RX ORDER — POTASSIUM CHLORIDE 20 MEQ/1
20 TABLET, EXTENDED RELEASE ORAL DAILY
Qty: 90 TABLET | Refills: 3 | Status: SHIPPED | OUTPATIENT
Start: 2025-04-12

## 2025-04-15 ENCOUNTER — PATIENT MESSAGE (OUTPATIENT)
Dept: INTERNAL MEDICINE | Facility: CLINIC | Age: 60
End: 2025-04-15
Payer: COMMERCIAL

## 2025-04-29 ENCOUNTER — OFFICE VISIT (OUTPATIENT)
Dept: UROLOGY | Facility: CLINIC | Age: 60
End: 2025-04-29
Payer: COMMERCIAL

## 2025-04-29 VITALS
SYSTOLIC BLOOD PRESSURE: 153 MMHG | BODY MASS INDEX: 25.43 KG/M2 | WEIGHT: 134.69 LBS | HEIGHT: 61 IN | DIASTOLIC BLOOD PRESSURE: 88 MMHG | RESPIRATION RATE: 18 BRPM | HEART RATE: 69 BPM

## 2025-04-29 DIAGNOSIS — N39.0 RECURRENT UTI: Primary | ICD-10-CM

## 2025-04-29 DIAGNOSIS — N95.8 GENITOURINARY SYNDROME OF MENOPAUSE: ICD-10-CM

## 2025-04-29 LAB
BILIRUBIN, UA POC OHS: NEGATIVE
BLOOD, UA POC OHS: NEGATIVE
CLARITY, UA POC OHS: ABNORMAL
COLOR, UA POC OHS: YELLOW
GLUCOSE, UA POC OHS: NEGATIVE
KETONES, UA POC OHS: NEGATIVE
LEUKOCYTES, UA POC OHS: ABNORMAL
NITRITE, UA POC OHS: POSITIVE
PH, UA POC OHS: 7
PROTEIN, UA POC OHS: NEGATIVE
SPECIFIC GRAVITY, UA POC OHS: 1.01
UROBILINOGEN, UA POC OHS: 1

## 2025-04-29 PROCEDURE — 99999 PR PBB SHADOW E&M-EST. PATIENT-LVL IV: CPT | Mod: PBBFAC,,, | Performed by: UROLOGY

## 2025-04-29 PROCEDURE — 99214 OFFICE O/P EST MOD 30 MIN: CPT | Mod: S$GLB,,, | Performed by: UROLOGY

## 2025-04-29 PROCEDURE — 3008F BODY MASS INDEX DOCD: CPT | Mod: CPTII,S$GLB,, | Performed by: UROLOGY

## 2025-04-29 PROCEDURE — 87086 URINE CULTURE/COLONY COUNT: CPT | Performed by: UROLOGY

## 2025-04-29 PROCEDURE — 3077F SYST BP >= 140 MM HG: CPT | Mod: CPTII,S$GLB,, | Performed by: UROLOGY

## 2025-04-29 PROCEDURE — 3079F DIAST BP 80-89 MM HG: CPT | Mod: CPTII,S$GLB,, | Performed by: UROLOGY

## 2025-04-29 PROCEDURE — 81003 URINALYSIS AUTO W/O SCOPE: CPT | Mod: QW,S$GLB,, | Performed by: UROLOGY

## 2025-04-29 PROCEDURE — 1160F RVW MEDS BY RX/DR IN RCRD: CPT | Mod: CPTII,S$GLB,, | Performed by: UROLOGY

## 2025-04-29 PROCEDURE — 3044F HG A1C LEVEL LT 7.0%: CPT | Mod: CPTII,S$GLB,, | Performed by: UROLOGY

## 2025-04-29 PROCEDURE — 1159F MED LIST DOCD IN RCRD: CPT | Mod: CPTII,S$GLB,, | Performed by: UROLOGY

## 2025-04-29 PROCEDURE — 4010F ACE/ARB THERAPY RXD/TAKEN: CPT | Mod: CPTII,S$GLB,, | Performed by: UROLOGY

## 2025-04-29 RX ORDER — ESTRADIOL 0.1 MG/G
1 CREAM VAGINAL
Qty: 42.5 G | Refills: 3 | Status: SHIPPED | OUTPATIENT
Start: 2025-05-01 | End: 2026-04-26

## 2025-04-29 RX ORDER — CEFDINIR 300 MG/1
300 CAPSULE ORAL 2 TIMES DAILY
Qty: 20 CAPSULE | Refills: 0 | Status: SHIPPED | OUTPATIENT
Start: 2025-04-29 | End: 2025-05-09

## 2025-04-29 NOTE — PROGRESS NOTES
CC: follow up renal cyst    History of Present Illness:   Janette Lee is a 59 y.o. female here for evaluation of renal cyst    25-Pt developed lower back pain and dysuria a few weeks ago. She was treated with 7 days of macrobid. She improved a little, but the symptoms never completely resolved. Currently, she is having malodorous urine and vaginal burning after she urinates. More frequency because she has been increasing her water intake. No gross hematuria or fever. No flank pain. This episode is her first instance of UTI over the past year.   10/12/22- renal US from today measures cyst at 5.5cm, with septation. I have personally reviewed these images. No flank pain, no gross hematuria. Has had 3 UTIs over the past 6 months. Treated by PCP. No cultures.   22- renal US personally reviewed, showing a mildly complex L renal cyst, measuring 4.8cm. No gross hematuria or flank pain. Since last visit, she had cholecystectomy, gastric sleeve and hernia repair.   10/5/21-She had an MRI with her chiropractor with a 3.1cm L renal cyst in . Report reviewed.   She underwent CT abd/pelvis in 2019, which I have personally reviewed, showing a 6.5cm Bosniak II L renal cyst.   Now, she recently had an Abdominal US at [facility] and per report, had 6.6cm minimally complex Left renal cyst, per report on patient's phone. No gross hematuria or frequency.     Review of Systems   Constitutional:  Negative for chills and fever.   Gastrointestinal:  Negative for abdominal pain.   Genitourinary:  Negative for flank pain.   All other systems reviewed and are negative.      Past Medical History:   Diagnosis Date    Arthritis     Depression     Hypertension     PERFECTO on CPAP        Past Surgical History:   Procedure Laterality Date    arthroscopic surgery Bilateral     knees     SECTION      CHOLECYSTECTOMY      EYE SURGERY  2017    gastric sleeve      REPAIR OF COLLATERAL LIGAMENT OF THUMB          Family History   Problem Relation Name Age of Onset    Hypertension Mother      Diabetes Father      Hypertension Father      Heart disease Father         Social History     Tobacco Use    Smoking status: Never    Smokeless tobacco: Never   Substance Use Topics    Alcohol use: Yes     Comment: rarely     Drug use: No       Current Outpatient Medications   Medication Sig Dispense Refill    ALPRAZolam (XANAX) 0.5 MG tablet Take 1 tablet (0.5 mg total) by mouth 2 (two) times daily as needed for Anxiety. 10 tablet 0    butalbital-acetaminophen-caffeine -40 mg (FIORICET, ESGIC) -40 mg per tablet Take 1 tablet by mouth every 6 (six) hours as needed for Headaches. 30 tablet 1    chlorthalidone (HYGROTEN) 25 MG Tab Take 1 tablet (25 mg total) by mouth once daily. 30 tablet 0    losartan (COZAAR) 100 MG tablet TAKE 1 TABLET BY MOUTH EVERY DAY 90 tablet 1    multivitamin (ONE DAILY MULTIVITAMIN) per tablet Take 1 tablet by mouth once daily.      ondansetron (ZOFRAN-ODT) 4 MG TbDL Take 1 tablet (4 mg total) by mouth every 8 (eight) hours as needed. 10 tablet 1    potassium chloride SA (KLOR-CON M20) 20 MEQ tablet Take 1 tablet (20 mEq total) by mouth once daily. 90 tablet 3    verapamiL (VERELAN) 360 MG C24P Take 1 capsule (360 mg total) by mouth once daily. 90 capsule 3    VITAMIN D2 1,250 mcg (50,000 unit) capsule Take 50,000 Units by mouth every 7 days.      buPROPion (WELLBUTRIN XL) 150 MG TB24 tablet Take 1 tablet (150 mg total) by mouth once daily. 90 tablet 1    cefdinir (OMNICEF) 300 MG capsule Take 1 capsule (300 mg total) by mouth 2 (two) times daily. for 10 days 20 capsule 0    [START ON 5/1/2025] estradioL (ESTRACE) 0.01 % (0.1 mg/gram) vaginal cream Place 1 g vaginally twice a week. 42.5 g 3    sumatriptan (IMITREX) 50 MG tablet Take 1 tablet (50 mg total) by mouth once as needed for Migraine (may repeat in 2 hours if no improvement of headache. do not excees 2 doses in 24 hours.). 9 tablet 1     topiramate (TOPAMAX) 25 MG tablet Take 25 mg by mouth 2 (two) times daily.       No current facility-administered medications for this visit.       Review of patient's allergies indicates:   Allergen Reactions    Ibuprofen        Physical Exam  Vitals:    04/29/25 0907   BP: (!) 153/88   Pulse: 69   Resp: 18     General: Well-developed, well-nourished, in no acute distress  HEENT: Normocephalic, atraumatic, extraocular movements intact  Neck: Supple, no supraclavicular or cervical lymphadenopathy, trachea midline  Respirations: even and unlabored  Back: midline spine, No CVA tenderness  Abdomen: soft, Non-tender, non-distended, no palpable masses, no rebound or guarding  Extremities: moves all equally, no clubbing, cyanosis or edema  Skin: Warm and dry. No lesions  Psych: normal affect  Neuro: Alert and oriented x 3. Cranial nerves II-XII intact      Urinalysis  Small LE, nit+      Assessment:  1. Recurrent UTI  POCT Urinalysis(Instrument)    Urine Culture High Risk      2. Genitourinary syndrome of menopause                Plan:   Recurrent UTI  -     POCT Urinalysis(Instrument)  -     Urine Culture High Risk    Genitourinary syndrome of menopause    Other orders  -     cefdinir (OMNICEF) 300 MG capsule; Take 1 capsule (300 mg total) by mouth 2 (two) times daily. for 10 days  Dispense: 20 capsule; Refill: 0  -     estradioL (ESTRACE) 0.01 % (0.1 mg/gram) vaginal cream; Place 1 g vaginally twice a week.  Dispense: 42.5 g; Refill: 3       Urine drop off protocol discussed    Follow up in about 1 year (around 4/29/2026).

## 2025-05-02 ENCOUNTER — RESULTS FOLLOW-UP (OUTPATIENT)
Dept: UROLOGY | Facility: CLINIC | Age: 60
End: 2025-05-02

## 2025-05-02 LAB — BACTERIA UR CULT: ABNORMAL

## 2025-06-15 ENCOUNTER — PATIENT MESSAGE (OUTPATIENT)
Dept: INTERNAL MEDICINE | Facility: CLINIC | Age: 60
End: 2025-06-15
Payer: COMMERCIAL

## 2025-06-16 ENCOUNTER — PATIENT MESSAGE (OUTPATIENT)
Dept: INTERNAL MEDICINE | Facility: CLINIC | Age: 60
End: 2025-06-16
Payer: COMMERCIAL

## 2025-07-17 DIAGNOSIS — I10 ESSENTIAL HYPERTENSION: ICD-10-CM

## 2025-07-18 RX ORDER — CHLORTHALIDONE 25 MG/1
25 TABLET ORAL DAILY
Qty: 90 TABLET | Refills: 3 | Status: SHIPPED | OUTPATIENT
Start: 2025-07-18

## 2025-07-20 ENCOUNTER — PATIENT MESSAGE (OUTPATIENT)
Dept: UROLOGY | Facility: CLINIC | Age: 60
End: 2025-07-20
Payer: COMMERCIAL

## 2025-07-21 DIAGNOSIS — N39.0 RECURRENT UTI: Primary | ICD-10-CM

## 2025-07-22 ENCOUNTER — LAB VISIT (OUTPATIENT)
Dept: LAB | Facility: HOSPITAL | Age: 60
End: 2025-07-22
Attending: UROLOGY
Payer: COMMERCIAL

## 2025-07-22 DIAGNOSIS — N39.0 RECURRENT UTI: ICD-10-CM

## 2025-07-22 PROCEDURE — 87086 URINE CULTURE/COLONY COUNT: CPT

## 2025-07-24 ENCOUNTER — TELEPHONE (OUTPATIENT)
Dept: UROLOGY | Facility: CLINIC | Age: 60
End: 2025-07-24
Payer: COMMERCIAL

## 2025-07-24 NOTE — TELEPHONE ENCOUNTER
----- Message from Alba Siddiqi MD sent at 7/24/2025  1:38 PM CDT -----  Notify pt of cefdinir sent to her pharmacy for UTI on preliminary culture.   ----- Message -----  From: Lab, Background User  Sent: 7/24/2025   9:02 AM CDT  To: Alba Siddiqi MD

## 2025-07-24 NOTE — TELEPHONE ENCOUNTER
Spoke with patient who was able to provide acceptable patient identifiers prior to start of conversation. Patient notified of preliminary urine culture results and that antibiotic Cefdinir have been sent to local pharmacy to be taken 1 tablet twice daily for 7-Day course. Patient verbalized understanding, no further assistance needed.

## 2025-07-25 LAB — BACTERIA UR CULT: ABNORMAL

## 2025-07-29 ENCOUNTER — PATIENT MESSAGE (OUTPATIENT)
Dept: ADMINISTRATIVE | Facility: HOSPITAL | Age: 60
End: 2025-07-29
Payer: COMMERCIAL

## 2025-08-15 ENCOUNTER — PATIENT MESSAGE (OUTPATIENT)
Dept: UROLOGY | Facility: CLINIC | Age: 60
End: 2025-08-15
Payer: COMMERCIAL

## 2025-08-15 DIAGNOSIS — N39.0 RECURRENT UTI: Primary | ICD-10-CM

## 2025-08-15 RX ORDER — LEVOFLOXACIN 500 MG/1
500 TABLET, FILM COATED ORAL DAILY
Qty: 7 TABLET | Refills: 0 | Status: SHIPPED | OUTPATIENT
Start: 2025-08-15 | End: 2025-08-22

## 2025-08-17 ENCOUNTER — PATIENT MESSAGE (OUTPATIENT)
Dept: INTERNAL MEDICINE | Facility: CLINIC | Age: 60
End: 2025-08-17
Payer: COMMERCIAL

## 2025-08-17 DIAGNOSIS — I10 ESSENTIAL HYPERTENSION: ICD-10-CM

## 2025-08-17 DIAGNOSIS — F41.9 ANXIETY: ICD-10-CM

## 2025-08-18 ENCOUNTER — LAB VISIT (OUTPATIENT)
Dept: LAB | Facility: HOSPITAL | Age: 60
End: 2025-08-18
Attending: UROLOGY
Payer: COMMERCIAL

## 2025-08-18 DIAGNOSIS — N39.0 RECURRENT UTI: ICD-10-CM

## 2025-08-18 PROCEDURE — 87077 CULTURE AEROBIC IDENTIFY: CPT

## 2025-08-18 RX ORDER — VERAPAMIL HYDROCHLORIDE 360 MG/1
360 CAPSULE, DELAYED RELEASE ORAL DAILY
Qty: 90 CAPSULE | Refills: 3 | Status: SHIPPED | OUTPATIENT
Start: 2025-08-18

## 2025-08-18 RX ORDER — CHLORTHALIDONE 25 MG/1
25 TABLET ORAL DAILY
Qty: 90 TABLET | Refills: 3 | Status: SHIPPED | OUTPATIENT
Start: 2025-08-18

## 2025-08-18 RX ORDER — LOSARTAN POTASSIUM 100 MG/1
100 TABLET ORAL DAILY
Qty: 90 TABLET | Refills: 1 | Status: SHIPPED | OUTPATIENT
Start: 2025-08-18

## 2025-08-19 RX ORDER — DULOXETIN HYDROCHLORIDE 60 MG/1
60 CAPSULE, DELAYED RELEASE ORAL DAILY
Qty: 90 CAPSULE | Refills: 3 | Status: SHIPPED | OUTPATIENT
Start: 2025-08-19

## 2025-08-19 RX ORDER — DULOXETIN HYDROCHLORIDE 60 MG/1
60 CAPSULE, DELAYED RELEASE ORAL DAILY
Qty: 90 CAPSULE | Refills: 3 | Status: SHIPPED | OUTPATIENT
Start: 2025-08-19 | End: 2025-08-19

## 2025-08-21 LAB — BACTERIA UR CULT: ABNORMAL

## 2025-08-22 ENCOUNTER — PROCEDURE VISIT (OUTPATIENT)
Dept: UROLOGY | Facility: CLINIC | Age: 60
End: 2025-08-22
Payer: COMMERCIAL

## 2025-08-22 VITALS
WEIGHT: 141.75 LBS | HEART RATE: 71 BPM | BODY MASS INDEX: 26.76 KG/M2 | HEIGHT: 61 IN | SYSTOLIC BLOOD PRESSURE: 163 MMHG | RESPIRATION RATE: 18 BRPM | DIASTOLIC BLOOD PRESSURE: 90 MMHG

## 2025-08-22 DIAGNOSIS — N95.8 GENITOURINARY SYNDROME OF MENOPAUSE: ICD-10-CM

## 2025-08-22 DIAGNOSIS — N39.0 RECURRENT UTI: Primary | ICD-10-CM

## 2025-08-22 LAB
BILIRUBIN, UA POC OHS: NEGATIVE
BLOOD, UA POC OHS: NEGATIVE
CLARITY, UA POC OHS: CLEAR
COLOR, UA POC OHS: YELLOW
GLUCOSE, UA POC OHS: NEGATIVE
KETONES, UA POC OHS: NEGATIVE
LEUKOCYTES, UA POC OHS: ABNORMAL
NITRITE, UA POC OHS: NEGATIVE
PH, UA POC OHS: 8
PROTEIN, UA POC OHS: NEGATIVE
SPECIFIC GRAVITY, UA POC OHS: 1.01
UROBILINOGEN, UA POC OHS: 0.2

## 2025-08-22 RX ORDER — CEFDINIR 300 MG/1
300 CAPSULE ORAL 2 TIMES DAILY
Qty: 14 CAPSULE | Refills: 0 | Status: SHIPPED | OUTPATIENT
Start: 2025-08-22 | End: 2025-08-29